# Patient Record
Sex: FEMALE | Race: BLACK OR AFRICAN AMERICAN | NOT HISPANIC OR LATINO | Employment: OTHER | URBAN - METROPOLITAN AREA
[De-identification: names, ages, dates, MRNs, and addresses within clinical notes are randomized per-mention and may not be internally consistent; named-entity substitution may affect disease eponyms.]

---

## 2021-01-20 ENCOUNTER — HISTORICAL (OUTPATIENT)
Dept: ADMINISTRATIVE | Facility: HOSPITAL | Age: 76
End: 2021-01-20

## 2021-01-20 LAB
ABS NEUT (OLG): 5.02 X10(3)/MCL (ref 2.1–9.2)
ALBUMIN SERPL-MCNC: 4.3 GM/DL (ref 3.4–4.8)
ALBUMIN/GLOB SERPL: 1.4 RATIO (ref 1.1–2)
ALP SERPL-CCNC: 67 UNIT/L (ref 40–150)
ALT SERPL-CCNC: 13 UNIT/L (ref 0–55)
APPEARANCE, UA: ABNORMAL
AST SERPL-CCNC: 20 UNIT/L (ref 5–34)
BACTERIA SPEC CULT: ABNORMAL /HPF
BASOPHILS # BLD AUTO: 0 X10(3)/MCL (ref 0–0.2)
BASOPHILS NFR BLD AUTO: 1 %
BILIRUB SERPL-MCNC: 0.6 MG/DL
BILIRUB UR QL STRIP: NEGATIVE
BILIRUBIN DIRECT+TOT PNL SERPL-MCNC: 0.3 MG/DL (ref 0–0.5)
BILIRUBIN DIRECT+TOT PNL SERPL-MCNC: 0.3 MG/DL (ref 0–0.8)
BUN SERPL-MCNC: 19.4 MG/DL (ref 9.8–20.1)
CALCIUM SERPL-MCNC: 10 MG/DL (ref 8.4–10.2)
CHLORIDE SERPL-SCNC: 108 MMOL/L (ref 98–107)
CHOLEST SERPL-MCNC: 170 MG/DL
CHOLEST/HDLC SERPL: 3 {RATIO} (ref 0–5)
CO2 SERPL-SCNC: 23 MMOL/L (ref 23–31)
COLOR UR: ABNORMAL
CREAT SERPL-MCNC: 1.37 MG/DL (ref 0.55–1.02)
DEPRECATED CALCIDIOL+CALCIFEROL SERPL-MC: 51.2 NG/ML (ref 30–80)
EOSINOPHIL # BLD AUTO: 0.2 X10(3)/MCL (ref 0–0.9)
EOSINOPHIL NFR BLD AUTO: 3 %
ERYTHROCYTE [DISTWIDTH] IN BLOOD BY AUTOMATED COUNT: 15.9 % (ref 11.5–17)
EST. AVERAGE GLUCOSE BLD GHB EST-MCNC: 114 MG/DL
GLOBULIN SER-MCNC: 3.1 GM/DL (ref 2.4–3.5)
GLUCOSE (UA): NEGATIVE
GLUCOSE SERPL-MCNC: 98 MG/DL (ref 82–115)
HBA1C MFR BLD: 5.6 %
HCT VFR BLD AUTO: 41.2 % (ref 37–47)
HDLC SERPL-MCNC: 53 MG/DL (ref 35–60)
HGB BLD-MCNC: 12.4 GM/DL (ref 12–16)
HGB UR QL STRIP: NEGATIVE
HYALINE CASTS #/AREA URNS LPF: ABNORMAL /[LPF]
KETONES UR QL STRIP: ABNORMAL
LDLC SERPL CALC-MCNC: 95 MG/DL (ref 50–140)
LEUKOCYTE ESTERASE UR QL STRIP: ABNORMAL
LYMPHOCYTES # BLD AUTO: 1.3 X10(3)/MCL (ref 0.6–4.6)
LYMPHOCYTES NFR BLD AUTO: 18 %
MCH RBC QN AUTO: 26.2 PG (ref 27–31)
MCHC RBC AUTO-ENTMCNC: 30.1 GM/DL (ref 33–36)
MCV RBC AUTO: 86.9 FL (ref 80–94)
MONOCYTES # BLD AUTO: 0.6 X10(3)/MCL (ref 0.1–1.3)
MONOCYTES NFR BLD AUTO: 8 %
NEUTROPHILS # BLD AUTO: 5.02 X10(3)/MCL (ref 2.1–9.2)
NEUTROPHILS NFR BLD AUTO: 70 %
NITRITE UR QL STRIP: NEGATIVE
PH UR STRIP: 5 [PH] (ref 5–9)
PLATELET # BLD AUTO: 188 X10(3)/MCL (ref 130–400)
PMV BLD AUTO: 11.4 FL (ref 9.4–12.4)
POTASSIUM SERPL-SCNC: 4.2 MMOL/L (ref 3.5–5.1)
PROT SERPL-MCNC: 7.4 GM/DL (ref 5.8–7.6)
PROT UR QL STRIP: ABNORMAL
RBC # BLD AUTO: 4.74 X10(6)/MCL (ref 4.2–5.4)
RBC #/AREA URNS HPF: ABNORMAL /HPF
SODIUM SERPL-SCNC: 143 MMOL/L (ref 136–145)
SP GR UR STRIP: 1.02 (ref 1–1.03)
SQUAMOUS EPITHELIAL, UA: ABNORMAL
TRIGL SERPL-MCNC: 109 MG/DL (ref 37–140)
TSH SERPL-ACNC: 2.35 UIU/ML (ref 0.35–4.94)
UROBILINOGEN UR STRIP-ACNC: 0.2
VIT B12 SERPL-MCNC: 462 PG/ML (ref 213–816)
VLDLC SERPL CALC-MCNC: 22 MG/DL
WBC # SPEC AUTO: 7.2 X10(3)/MCL (ref 4.5–11.5)
WBC #/AREA URNS HPF: ABNORMAL /HPF

## 2021-01-22 LAB — FINAL CULTURE: NORMAL

## 2021-02-03 ENCOUNTER — HISTORICAL (OUTPATIENT)
Dept: RADIOLOGY | Facility: HOSPITAL | Age: 76
End: 2021-02-03

## 2022-04-11 ENCOUNTER — HISTORICAL (OUTPATIENT)
Dept: ADMINISTRATIVE | Facility: HOSPITAL | Age: 77
End: 2022-04-11

## 2022-04-24 VITALS
OXYGEN SATURATION: 94 % | BODY MASS INDEX: 25.97 KG/M2 | WEIGHT: 132.25 LBS | SYSTOLIC BLOOD PRESSURE: 124 MMHG | DIASTOLIC BLOOD PRESSURE: 81 MMHG | HEIGHT: 60 IN

## 2022-05-26 DIAGNOSIS — F03.90 DEMENTIA WITHOUT BEHAVIORAL DISTURBANCE, UNSPECIFIED DEMENTIA TYPE: Primary | ICD-10-CM

## 2022-06-20 ENCOUNTER — TELEPHONE (OUTPATIENT)
Dept: INTERNAL MEDICINE | Facility: CLINIC | Age: 77
End: 2022-06-20
Payer: MEDICARE

## 2022-06-20 DIAGNOSIS — E78.5 DYSLIPIDEMIA (HIGH LDL; LOW HDL): ICD-10-CM

## 2022-06-20 DIAGNOSIS — I10 HYPERTENSION, UNSPECIFIED TYPE: Primary | ICD-10-CM

## 2022-06-20 NOTE — TELEPHONE ENCOUNTER
----- Message from Francis Malhotra sent at 6/20/2022 11:39 AM CDT -----  Please add pt  lab orders

## 2022-06-28 ENCOUNTER — OFFICE VISIT (OUTPATIENT)
Dept: INTERNAL MEDICINE | Facility: CLINIC | Age: 77
End: 2022-06-28
Payer: MEDICARE

## 2022-06-28 VITALS
RESPIRATION RATE: 16 BRPM | BODY MASS INDEX: 25.58 KG/M2 | OXYGEN SATURATION: 98 % | DIASTOLIC BLOOD PRESSURE: 82 MMHG | HEIGHT: 62 IN | SYSTOLIC BLOOD PRESSURE: 126 MMHG | HEART RATE: 70 BPM | TEMPERATURE: 99 F | WEIGHT: 139 LBS

## 2022-06-28 DIAGNOSIS — Z78.0 POSTMENOPAUSAL STATE: ICD-10-CM

## 2022-06-28 DIAGNOSIS — H61.20 IMPACTED CERUMEN, UNSPECIFIED LATERALITY: ICD-10-CM

## 2022-06-28 DIAGNOSIS — E78.5 DYSLIPIDEMIA: ICD-10-CM

## 2022-06-28 DIAGNOSIS — Z00.00 MEDICARE ANNUAL WELLNESS VISIT, SUBSEQUENT: Primary | ICD-10-CM

## 2022-06-28 DIAGNOSIS — I10 HYPERTENSION, UNSPECIFIED TYPE: ICD-10-CM

## 2022-06-28 DIAGNOSIS — I25.10 ARTERIOSCLEROSIS OF CORONARY ARTERY: ICD-10-CM

## 2022-06-28 DIAGNOSIS — Z12.31 SCREENING MAMMOGRAM FOR BREAST CANCER: ICD-10-CM

## 2022-06-28 PROCEDURE — 99214 OFFICE O/P EST MOD 30 MIN: CPT | Mod: ,,, | Performed by: INTERNAL MEDICINE

## 2022-06-28 PROCEDURE — 99214 PR OFFICE/OUTPT VISIT, EST, LEVL IV, 30-39 MIN: ICD-10-PCS | Mod: ,,, | Performed by: INTERNAL MEDICINE

## 2022-06-28 RX ORDER — NAPROXEN SODIUM 220 MG/1
81 TABLET, FILM COATED ORAL DAILY
COMMUNITY

## 2022-06-28 RX ORDER — PANTOPRAZOLE SODIUM 40 MG/1
40 TABLET, DELAYED RELEASE ORAL DAILY
COMMUNITY

## 2022-06-28 RX ORDER — OMEPRAZOLE 10 MG/1
10 CAPSULE, DELAYED RELEASE ORAL DAILY
COMMUNITY
End: 2022-06-28

## 2022-06-28 RX ORDER — QUETIAPINE FUMARATE 25 MG/1
25 TABLET, FILM COATED ORAL NIGHTLY
COMMUNITY
Start: 2022-06-27 | End: 2022-09-19 | Stop reason: SDUPTHER

## 2022-06-28 RX ORDER — SERTRALINE HYDROCHLORIDE 100 MG/1
150 TABLET, FILM COATED ORAL DAILY
COMMUNITY
Start: 2022-01-06 | End: 2023-04-12 | Stop reason: SDUPTHER

## 2022-06-28 RX ORDER — ATORVASTATIN CALCIUM 20 MG/1
20 TABLET, FILM COATED ORAL DAILY
COMMUNITY
Start: 2022-04-08 | End: 2023-03-20 | Stop reason: SDUPTHER

## 2022-06-28 RX ORDER — METOPROLOL SUCCINATE 25 MG/1
12.5 TABLET, EXTENDED RELEASE ORAL DAILY
COMMUNITY
Start: 2022-04-11

## 2022-06-28 RX ORDER — DONEPEZIL HYDROCHLORIDE 5 MG/1
5 TABLET, FILM COATED ORAL DAILY
Qty: 90 TABLET | Refills: 3 | Status: SHIPPED | OUTPATIENT
Start: 2022-06-28 | End: 2022-08-19

## 2022-06-28 RX ORDER — CHOLECALCIFEROL (VITAMIN D3) 25 MCG
5000 TABLET ORAL
COMMUNITY

## 2022-06-28 NOTE — ASSESSMENT & PLAN NOTE
General health maint education given  Labs ordered  Needs mammo and DEXA  PCV 20-- we are out today  Needs COVID 4th dose

## 2022-06-28 NOTE — PROGRESS NOTES
Subjective:      Patient ID: Grace Allen is a 77 y.o. female.    Chief Complaint: Medicare AWV      HPI:    75-year-old -American female here for wellness  [facility] for Cards  Patient with cognitive decline, new patient to us in January 2021; and we have not seen her since.   Seeing neurologist in [place] for cognitive decline; going see Issa in December  Needs ears cleaned   Patient having medication compliance issues  Was then reporting some nausea and dry heaving.  Has been   Trazodone caused nightmares      Problem List Items Addressed This Visit        ENT    Impacted cerumen    Relevant Orders    Ambulatory referral/consult to ENT       Cardiac/Vascular    Arteriosclerosis of coronary artery    Dyslipidemia    Relevant Orders    CBC Auto Differential    Comprehensive Metabolic Panel    Lipid Panel    Urinalysis, Reflex to Urine Culture Urine, Clean Catch    Hypertension    Relevant Orders    CBC Auto Differential    Comprehensive Metabolic Panel    Lipid Panel    Urinalysis, Reflex to Urine Culture Urine, Clean Catch       Renal/    Screening mammogram for breast cancer    Relevant Orders    Mammo Digital Screening Bilat    Postmenopausal state    Relevant Orders    DXA Bone Density Spine And Hip       Other    Medicare annual wellness visit, subsequent - Primary    Current Assessment & Plan     General health maint education given  Labs ordered  Needs mammo and DEXA  PCV 20-- we are out today  Needs COVID 4th dose                     Past Medical History:  Past Medical History:   Diagnosis Date    CAD (coronary artery disease)     Dyslipidemia     Hypertension      Past Surgical History:   Procedure Laterality Date    CHOLECYSTECTOMY       Review of patient's allergies indicates:   Allergen Reactions    Adhesive tape-silicones      Current Outpatient Medications on File Prior to Visit   Medication Sig Dispense Refill    aspirin 81 MG Chew Take 81 mg by mouth once daily at 6am.       atorvastatin (LIPITOR) 20 MG tablet Take 20 mg by mouth once daily at 6am.      metoprolol succinate (TOPROL-XL) 25 MG 24 hr tablet Take 12.5 mg by mouth once daily.      pantoprazole (PROTONIX) 40 MG tablet Take 40 mg by mouth once daily at 6am.      QUEtiapine (SEROQUEL) 25 MG Tab Take 25 mg by mouth nightly.      sertraline (ZOLOFT) 100 MG tablet Take 150 mg by mouth once daily.      vitamin D (VITAMIN D3) 1000 units Tab Take 5,000 Units by mouth.      [DISCONTINUED] omeprazole (PRILOSEC) 10 MG capsule Take 10 mg by mouth once daily at 6am.       No current facility-administered medications on file prior to visit.     Social History     Socioeconomic History    Marital status:    Tobacco Use    Smoking status: Never Smoker    Smokeless tobacco: Never Used   Substance and Sexual Activity    Alcohol use: Not Currently    Drug use: Never    Sexual activity: Not Currently     Family History   Problem Relation Age of Onset    Diabetes Mother     Lung cancer Father            Review of Systems  Constitutional: No fever,  no fatigue, no chills, no night sweats, no weight gain, no weight loss, no changes in appetite.   Eye: No redness, no acute vision loss, no blurred vision, no double vision, no eye pain  ENMT: No sore throat, no nasal drainage, no nose bleeds,  no headache, no ear pain, no ear drainage, no acute hearing loss  Respiratory: No cough, no sputum production, no shortness of breath, no hemoptysis, no wheezing.  Cardiovascular: No chest pain, no chest tightness, no VENCES, no PND, no orthopnea, no swelling, no palpitations.  Gastrointestinal: No abdominal pain, no nausea, no vomiting, no diarrhea, no constipation, no difficulty swallowing, no change in bowel habits, no rectal bleeding  Genitourinary: no urgency, no frequency, no burning or pain when urinating, no blood in urine, no incontinence  Heme/Lymph: No easy bruising and/or bleeding, no swollen or painful glands.  Endocrine: No  "polyuria, no polydipsia, no polyphagia, no heat or cold intolerance.  Musculoskeletal: No muscle pain, no muscle weakness, no joint pain, no red or swollen joints.  Integumentary: No rash, no pruritis, no hair or nail changes.  Neurologic: No dizziness, no fainting, no tremors, no tingling and/ or numbness.  Psychiatric: + anxiety, + depression, + memory loss  All Other ROS: Negative with exception of what is documented in the history of present illness     Objective:   /82 (BP Location: Left arm, Patient Position: Sitting, BP Method: Medium (Manual))   Pulse 70   Temp 98.6 °F (37 °C) (Temporal)   Resp 16   Ht 5' 2" (1.575 m)   Wt 63 kg (139 lb)   SpO2 98%   BMI 25.42 kg/m²     Physical Exam  General : Alert and oriented, No acute distress, well, developed, well nourished, afebrile   Eye : PERRLA. EOMI. Normal conjunctiva without injection. Sclerae are nonicteric. No pallor.  HEENT : Normocephalic. Neck supple. Normal hearing. Oral mucosa is moist.  Respiratory : Lungs are clear to auscultation bilaterally, non-labored. Symmetrical chest wall expansion. No crackles, wheeze, or rhonci.  Cardiovascular : Normal rate, Regular rhythm. No murmurs, rubs, or gallops. Pulses 2+ in all extremities. No Edema.  Gastrointestinal : Soft, nontender, non-distended, bowel sounds normal, no organomegaly, no guarding, no rebound.  Musculoskeletal : Normal ROM.  No muscle tenderness.  Integumentary : Warm to touch. Intact. No rash.    Neurologic : Alert and oriented. No focal deficits. Cranial Nerves II-XII are grossly intact.  Lymph: No palpable lymphadenopathy appreciated.  Psychiatric : Cooperative, Appropriate mood & affect. Normal judgment.          Assessment:     1. Medicare annual wellness visit, subsequent    2. Screening mammogram for breast cancer    3. Impacted cerumen, unspecified laterality    4. Postmenopausal state    5. Hypertension, unspecified type    6. Dyslipidemia    7. Arteriosclerosis of coronary " artery            Plan:       I have discontinued Grace Allen's omeprazole. I am also having her start on donepeziL. Additionally, I am having her maintain her sertraline, QUEtiapine, pantoprazole, metoprolol succinate, vitamin D, atorvastatin, and aspirin.      Problem List Items Addressed This Visit        ENT    Impacted cerumen    Relevant Orders    Ambulatory referral/consult to ENT       Cardiac/Vascular    Arteriosclerosis of coronary artery    Dyslipidemia    Relevant Orders    CBC Auto Differential    Comprehensive Metabolic Panel    Lipid Panel    Urinalysis, Reflex to Urine Culture Urine, Clean Catch    Hypertension    Relevant Orders    CBC Auto Differential    Comprehensive Metabolic Panel    Lipid Panel    Urinalysis, Reflex to Urine Culture Urine, Clean Catch       Renal/    Screening mammogram for breast cancer    Relevant Orders    Mammo Digital Screening Bilat    Postmenopausal state    Relevant Orders    DXA Bone Density Spine And Hip       Other    Medicare annual wellness visit, subsequent - Primary     General health maint education given  Labs ordered  Needs mammo and DEXA  PCV 20-- we are out today  Needs COVID 4th dose                   Grace was seen today for medicare awv.    Diagnoses and all orders for this visit:    Medicare annual wellness visit, subsequent    Screening mammogram for breast cancer  -     Mammo Digital Screening Bilat; Future    Impacted cerumen, unspecified laterality  -     Ambulatory referral/consult to ENT; Future    Postmenopausal state  -     DXA Bone Density Spine And Hip; Future    Hypertension, unspecified type  -     CBC Auto Differential; Future  -     Comprehensive Metabolic Panel; Future  -     Lipid Panel; Future  -     Urinalysis, Reflex to Urine Culture Urine, Clean Catch; Future    Dyslipidemia  -     CBC Auto Differential; Future  -     Comprehensive Metabolic Panel; Future  -     Lipid Panel; Future  -     Urinalysis, Reflex to Urine Culture  Urine, Clean Catch; Future    Arteriosclerosis of coronary artery    Other orders  -     donepeziL (ARICEPT) 5 MG tablet; Take 1 tablet (5 mg total) by mouth once daily.  -     Cancel: (In Office Administered) Pneumococcal Conjugate Vaccine (20 Valent) (IM)            Medications Ordered This Encounter   Medications    donepeziL (ARICEPT) 5 MG tablet     Sig: Take 1 tablet (5 mg total) by mouth once daily.     Dispense:  90 tablet     Refill:  3     [unfilled]  Orders Placed This Encounter   Procedures    Mammo Digital Screening Bilat     Standing Status:   Future     Standing Expiration Date:   6/28/2024     Order Specific Question:   May the Radiologist modify the order per protocol to meet the clinical needs of the patient?     Answer:   Yes     Order Specific Question:   Release to patient     Answer:   Immediate    DXA Bone Density Spine And Hip     Standing Status:   Future     Standing Expiration Date:   6/28/2025     Order Specific Question:   May the Radiologist modify the order per protocol to meet the clinical needs of the patient?     Answer:   Yes     Order Specific Question:   Release to patient     Answer:   Immediate    CBC Auto Differential     Standing Status:   Future     Standing Expiration Date:   8/27/2023    Comprehensive Metabolic Panel     Standing Status:   Future     Standing Expiration Date:   8/27/2023    Lipid Panel     Standing Status:   Future     Standing Expiration Date:   8/27/2023    Urinalysis, Reflex to Urine Culture Urine, Clean Catch     Standing Status:   Future     Standing Expiration Date:   8/27/2023     Order Specific Question:   Preferred Collection Type     Answer:   Urine, Clean Catch     Order Specific Question:   Specimen Source     Answer:   Urine    Ambulatory referral/consult to ENT     Standing Status:   Future     Standing Expiration Date:   7/28/2023     Referral Priority:   Routine     Referral Type:   Consultation     Referral Reason:   Specialty  Services Required     Referred to Provider:   Karthikeyan Doan MD     Requested Specialty:   Otolaryngology     Number of Visits Requested:   1                   Follow up in about 6 months (around 12/28/2022) for with labs prior to visit.

## 2022-07-01 ENCOUNTER — LAB VISIT (OUTPATIENT)
Dept: LAB | Facility: HOSPITAL | Age: 77
End: 2022-07-01
Attending: INTERNAL MEDICINE
Payer: MEDICARE

## 2022-07-01 DIAGNOSIS — I10 HYPERTENSION, UNSPECIFIED TYPE: ICD-10-CM

## 2022-07-01 DIAGNOSIS — E78.5 DYSLIPIDEMIA (HIGH LDL; LOW HDL): ICD-10-CM

## 2022-07-01 LAB
ALBUMIN SERPL-MCNC: 4 GM/DL (ref 3.4–4.8)
ALBUMIN/GLOB SERPL: 1.5 RATIO (ref 1.1–2)
ALP SERPL-CCNC: 55 UNIT/L (ref 40–150)
ALT SERPL-CCNC: 28 UNIT/L (ref 0–55)
APPEARANCE UR: CLEAR
AST SERPL-CCNC: 27 UNIT/L (ref 5–34)
BACTERIA #/AREA URNS AUTO: ABNORMAL /HPF
BASOPHILS # BLD AUTO: 0.04 X10(3)/MCL (ref 0–0.2)
BASOPHILS NFR BLD AUTO: 0.8 %
BILIRUB UR QL STRIP.AUTO: NEGATIVE MG/DL
BILIRUBIN DIRECT+TOT PNL SERPL-MCNC: 0.6 MG/DL
BUN SERPL-MCNC: 26.1 MG/DL (ref 9.8–20.1)
CALCIUM SERPL-MCNC: 9.8 MG/DL (ref 8.4–10.2)
CHLORIDE SERPL-SCNC: 110 MMOL/L (ref 98–107)
CHOLEST SERPL-MCNC: 147 MG/DL
CHOLEST/HDLC SERPL: 2 {RATIO} (ref 0–5)
CO2 SERPL-SCNC: 27 MMOL/L (ref 23–31)
COLOR UR AUTO: YELLOW
CREAT SERPL-MCNC: 1.41 MG/DL (ref 0.55–1.02)
EOSINOPHIL # BLD AUTO: 0.28 X10(3)/MCL (ref 0–0.9)
EOSINOPHIL NFR BLD AUTO: 5.6 %
ERYTHROCYTE [DISTWIDTH] IN BLOOD BY AUTOMATED COUNT: 15.4 % (ref 11.5–17)
GLOBULIN SER-MCNC: 2.6 GM/DL (ref 2.4–3.5)
GLUCOSE SERPL-MCNC: 89 MG/DL (ref 82–115)
GLUCOSE UR QL STRIP.AUTO: NEGATIVE MG/DL
HCT VFR BLD AUTO: 33.7 % (ref 37–47)
HDLC SERPL-MCNC: 60 MG/DL (ref 35–60)
HGB BLD-MCNC: 10.7 GM/DL (ref 12–16)
IMM GRANULOCYTES # BLD AUTO: 0.01 X10(3)/MCL (ref 0–0.04)
IMM GRANULOCYTES NFR BLD AUTO: 0.2 %
KETONES UR QL STRIP.AUTO: NEGATIVE MG/DL
LDLC SERPL CALC-MCNC: 79 MG/DL (ref 50–140)
LEUKOCYTE ESTERASE UR QL STRIP.AUTO: ABNORMAL UNIT/L
LYMPHOCYTES # BLD AUTO: 1.78 X10(3)/MCL (ref 0.6–4.6)
LYMPHOCYTES NFR BLD AUTO: 35.5 %
MCH RBC QN AUTO: 27 PG (ref 27–31)
MCHC RBC AUTO-ENTMCNC: 31.8 MG/DL (ref 33–36)
MCV RBC AUTO: 84.9 FL (ref 80–94)
MONOCYTES # BLD AUTO: 0.56 X10(3)/MCL (ref 0.1–1.3)
MONOCYTES NFR BLD AUTO: 11.2 %
NEUTROPHILS # BLD AUTO: 2.3 X10(3)/MCL (ref 2.1–9.2)
NEUTROPHILS NFR BLD AUTO: 46.7 %
NITRITE UR QL STRIP.AUTO: NEGATIVE
NRBC BLD AUTO-RTO: 0 %
PH UR STRIP.AUTO: 5.5 [PH]
PLATELET # BLD AUTO: 137 X10(3)/MCL (ref 130–400)
PMV BLD AUTO: 11 FL (ref 7.4–10.4)
POTASSIUM SERPL-SCNC: 4.4 MMOL/L (ref 3.5–5.1)
PROT SERPL-MCNC: 6.6 GM/DL (ref 5.8–7.6)
PROT UR QL STRIP.AUTO: NEGATIVE MG/DL
RBC # BLD AUTO: 3.97 X10(6)/MCL (ref 4.2–5.4)
RBC #/AREA URNS AUTO: <5 /HPF
RBC UR QL AUTO: NEGATIVE UNIT/L
SODIUM SERPL-SCNC: 143 MMOL/L (ref 136–145)
SP GR UR STRIP.AUTO: 1.02 (ref 1–1.03)
SQUAMOUS #/AREA URNS AUTO: <5 /HPF
TRIGL SERPL-MCNC: 42 MG/DL (ref 37–140)
TSH SERPL-ACNC: 4.24 UIU/ML (ref 0.35–4.94)
UROBILINOGEN UR STRIP-ACNC: 0.2 MG/DL
VLDLC SERPL CALC-MCNC: 8 MG/DL
WBC # SPEC AUTO: 5 X10(3)/MCL (ref 4.5–11.5)
WBC #/AREA URNS AUTO: 36 /HPF

## 2022-07-01 PROCEDURE — 36415 COLL VENOUS BLD VENIPUNCTURE: CPT

## 2022-07-01 PROCEDURE — 87077 CULTURE AEROBIC IDENTIFY: CPT

## 2022-07-01 PROCEDURE — 85025 COMPLETE CBC W/AUTO DIFF WBC: CPT

## 2022-07-01 PROCEDURE — 80053 COMPREHEN METABOLIC PANEL: CPT

## 2022-07-01 PROCEDURE — 84443 ASSAY THYROID STIM HORMONE: CPT

## 2022-07-01 PROCEDURE — 81001 URINALYSIS AUTO W/SCOPE: CPT

## 2022-07-01 PROCEDURE — 80061 LIPID PANEL: CPT

## 2022-07-03 LAB — BACTERIA UR CULT: ABNORMAL

## 2022-07-06 ENCOUNTER — TELEPHONE (OUTPATIENT)
Dept: INTERNAL MEDICINE | Facility: CLINIC | Age: 77
End: 2022-07-06
Payer: MEDICARE

## 2022-07-06 DIAGNOSIS — D64.9 ANEMIA, UNSPECIFIED TYPE: Primary | ICD-10-CM

## 2022-07-06 NOTE — TELEPHONE ENCOUNTER
----- Message from Dewey Cazares MD sent at 7/6/2022 12:50 PM CDT -----  Laboratory studies reviewed patient with some mild anemia  Patient would benefit from over-the-counter iron supplement or prenatal multivitamin with iron  Will plan for recheck of iron studies in 6 weeks, she can come by and  some stool cards as well to test for blood in the stool.  Lab orders have been placed

## 2022-07-06 NOTE — PROGRESS NOTES
Laboratory studies reviewed patient with some mild anemia  Patient would benefit from over-the-counter iron supplement or prenatal multivitamin with iron  Will plan for recheck of iron studies in 6 weeks, she can come by and  some stool cards as well to test for blood in the stool.  Lab orders have been placed

## 2022-07-11 ENCOUNTER — CLINICAL SUPPORT (OUTPATIENT)
Dept: INTERNAL MEDICINE | Facility: CLINIC | Age: 77
End: 2022-07-11
Payer: MEDICARE

## 2022-07-11 DIAGNOSIS — Z23 NEED FOR PNEUMOCOCCAL VACCINATION: Primary | ICD-10-CM

## 2022-07-11 PROCEDURE — 90677 PR PNEUMOCOCCAL CONJ VACCINE, 20 VALENT, IM: ICD-10-PCS | Mod: ,,, | Performed by: INTERNAL MEDICINE

## 2022-07-11 PROCEDURE — G0009 PR ADMIN PNEUMOCOCCAL VACCINE: ICD-10-PCS | Mod: ,,, | Performed by: INTERNAL MEDICINE

## 2022-07-11 PROCEDURE — G0009 ADMIN PNEUMOCOCCAL VACCINE: HCPCS | Mod: ,,, | Performed by: INTERNAL MEDICINE

## 2022-07-11 PROCEDURE — 90677 PCV20 VACCINE IM: CPT | Mod: ,,, | Performed by: INTERNAL MEDICINE

## 2022-07-11 NOTE — PROGRESS NOTES
Pt came into office today for Pneumo 20 vaccine.   Pt tolerated immunization well.   Pneumo 20 was given in left Deltoid.

## 2022-07-25 ENCOUNTER — TELEPHONE (OUTPATIENT)
Dept: INTERNAL MEDICINE | Facility: CLINIC | Age: 77
End: 2022-07-25
Payer: MEDICARE

## 2022-07-25 NOTE — TELEPHONE ENCOUNTER
----- Message from Crys Shaknar sent at 7/25/2022  1:35 PM CDT -----  Regarding: med advice  .Type:  Needs Medical Advice    Who Called: Pt's daughter, Barbie  Symptoms (please be specific):    How long has patient had these symptoms:    Pharmacy name and phone #:  [id number] - [place] - [address] AT [facility] AT [place]  Would the patient rather a call back or a response via [facility]? Call back  Best Call Back Number: [phone]  Additional Information: Pt has been nightmares while on donepeziL (ARICEPT) 5 MG tablet, pt would like med advise, pt has been off meds for 2 nights now

## 2022-07-25 NOTE — TELEPHONE ENCOUNTER
Did symptoms resolve since stopping the medication? If so then we won't resume  If she is still having symptoms then she will need to come in for a work up

## 2022-07-25 NOTE — TELEPHONE ENCOUNTER
Daughter Barbie stated patient started with nightmares and headaches on the 2nd week after starting Aricept 5 mg. Daughters been holding medication, please advise!

## 2022-08-19 ENCOUNTER — HOSPITAL ENCOUNTER (OUTPATIENT)
Dept: RADIOLOGY | Facility: HOSPITAL | Age: 77
Discharge: HOME | End: 2022-08-19
Attending: INTERNAL MEDICINE
Payer: MEDICARE

## 2022-08-19 ENCOUNTER — OFFICE VISIT (OUTPATIENT)
Dept: INTERNAL MEDICINE | Facility: CLINIC | Age: 77
End: 2022-08-19
Payer: MEDICARE

## 2022-08-19 VITALS
HEART RATE: 65 BPM | RESPIRATION RATE: 16 BRPM | HEIGHT: 60 IN | BODY MASS INDEX: 27.48 KG/M2 | TEMPERATURE: 98 F | OXYGEN SATURATION: 98 % | SYSTOLIC BLOOD PRESSURE: 136 MMHG | DIASTOLIC BLOOD PRESSURE: 84 MMHG | WEIGHT: 140 LBS

## 2022-08-19 DIAGNOSIS — T14.8XXA BRUISING: ICD-10-CM
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
## 2022-08-19 DIAGNOSIS — M79.601 RIGHT ARM PAIN: ICD-10-CM

## 2022-08-19 DIAGNOSIS — F02.80 EARLY ONSET ALZHEIMER'S DEMENTIA WITHOUT BEHAVIORAL DISTURBANCE: Primary | ICD-10-CM

## 2022-08-19 DIAGNOSIS — R07.81 RIB PAIN: ICD-10-CM

## 2022-08-19 DIAGNOSIS — G30.0 EARLY ONSET ALZHEIMER'S DEMENTIA WITHOUT BEHAVIORAL DISTURBANCE: Primary | ICD-10-CM

## 2022-08-19 DIAGNOSIS — R41.0 CONFUSION: ICD-10-CM

## 2022-08-19 PROBLEM — G30.9 ALZHEIMER'S DEMENTIA: Status: ACTIVE | Noted: 2022-08-19

## 2022-08-19 PROCEDURE — 99214 PR OFFICE/OUTPT VISIT, EST, LEVL IV, 30-39 MIN: ICD-10-PCS | Mod: ,,, | Performed by: INTERNAL MEDICINE

## 2022-08-19 PROCEDURE — 71110 X-RAY EXAM RIBS BIL 3 VIEWS: CPT | Mod: TC

## 2022-08-19 PROCEDURE — 99214 OFFICE O/P EST MOD 30 MIN: CPT | Mod: ,,, | Performed by: INTERNAL MEDICINE

## 2022-08-19 PROCEDURE — 73060 X-RAY EXAM OF HUMERUS: CPT | Mod: TC,RT

## 2022-08-19 RX ORDER — METHOCARBAMOL 500 MG/1
500 TABLET, FILM COATED ORAL 4 TIMES DAILY PRN
Qty: 40 TABLET | Refills: 0 | Status: SHIPPED | OUTPATIENT
Start: 2022-08-19 | End: 2022-08-29

## 2022-08-19 RX ORDER — MEMANTINE HYDROCHLORIDE 5 MG/1
5 TABLET ORAL 2 TIMES DAILY
Qty: 60 TABLET | Refills: 11 | Status: SHIPPED | OUTPATIENT
Start: 2022-08-19 | End: 2022-12-28 | Stop reason: SDUPTHER

## 2022-08-19 RX ORDER — CIPROFLOXACIN 250 MG/1
250 TABLET, FILM COATED ORAL 2 TIMES DAILY
Qty: 10 TABLET | Refills: 0 | Status: SHIPPED | OUTPATIENT
Start: 2022-08-19 | End: 2022-08-24

## 2022-08-19 NOTE — PROGRESS NOTES
Subjective:      Patient ID: Grace Allen is a 77 y.o. female.    Chief Complaint: Follow-up ([facility] ED F/U from Fall )      HPI:    77-year-old female had a fall at  [place] she slipped on some give cards and fell on her right side hitting her chest right arm and head.  She presented to a well-developed emergency room had a CT of the head that was unremarkable and was discharged home.  There was no rib x-ray or right upper extremity x-ray, no labs are urine testing.  She complains of pain to the right chest wall and right forearm.  She has baseline dementia and always is a little confused but seems a little bit more confused today.  Will plan on ordering some labs and a urine study as well as some rib films and high right humeral films.      Problem List Items Addressed This Visit        Neuro    Alzheimer's dementia - Primary    Current Assessment & Plan     Daughter reports side effects Aricept, we have discontinued will start her on some Namenda.           Confusion    Current Assessment & Plan     There was no rib x-ray or right upper extremity x-ray, no labs are urine testing.  She complains of pain to the right chest wall and right forearm.  She has baseline dementia and always is a little confused but seems a little bit more confused today.  Will plan on ordering some labs and a urine study as well as some rib films and high right humeral films.           Relevant Orders    Urinalysis, Reflex to Urine Culture Urine, Clean Catch    CBC Auto Differential    Comprehensive Metabolic Panel       Orthopedic    Rib pain    Right arm pain      Other Visit Diagnoses     Fall against object        Relevant Orders    X-Ray Ribs 3 Views Bilateral    X-Ray Humerus 2 View Right    Urinalysis, Reflex to Urine Culture Urine, Clean Catch    CBC Auto Differential    Comprehensive Metabolic Panel    Bruising        Relevant Orders    X-Ray Ribs 3 Views Bilateral    X-Ray Humerus 2 View Right              Past Medical  History:  Past Medical History:   Diagnosis Date    CAD (coronary artery disease)     Dyslipidemia     Hypertension      Past Surgical History:   Procedure Laterality Date    CHOLECYSTECTOMY       Review of patient's allergies indicates:   Allergen Reactions    Adhesive tape-silicones      Current Outpatient Medications on File Prior to Visit   Medication Sig Dispense Refill    aspirin 81 MG Chew Take 81 mg by mouth once daily at 6am.      atorvastatin (LIPITOR) 20 MG tablet Take 20 mg by mouth once daily at 6am.      metoprolol succinate (TOPROL-XL) 25 MG 24 hr tablet Take 12.5 mg by mouth once daily.      pantoprazole (PROTONIX) 40 MG tablet Take 40 mg by mouth once daily at 6am.      QUEtiapine (SEROQUEL) 25 MG Tab Take 25 mg by mouth nightly.      sertraline (ZOLOFT) 100 MG tablet Take 150 mg by mouth once daily.      vitamin D (VITAMIN D3) 1000 units Tab Take 5,000 Units by mouth.      [DISCONTINUED] donepeziL (ARICEPT) 5 MG tablet Take 1 tablet (5 mg total) by mouth once daily. 90 tablet 3     No current facility-administered medications on file prior to visit.     Social History     Socioeconomic History    Marital status:    Tobacco Use    Smoking status: Never Smoker    Smokeless tobacco: Never Used   Substance and Sexual Activity    Alcohol use: Not Currently    Drug use: Never    Sexual activity: Not Currently     Family History   Problem Relation Age of Onset    Diabetes Mother     Lung cancer Father            Review of Systems  Constitutional: No fever,  no fatigue, no chills, no night sweats, no weight gain, no weight loss, no changes in appetite.   Eye: No redness, no acute vision loss, no blurred vision, no double vision, no eye pain  ENMT: No sore throat, no nasal drainage, no nose bleeds,  no headache, no ear pain, no ear drainage, no acute hearing loss  Respiratory: No cough, no sputum production, no shortness of breath, no hemoptysis, no wheezing.  Cardiovascular: No  chest pain, no chest tightness, no VENCES, no PND, no orthopnea, no swelling, no palpitations.  Gastrointestinal: No abdominal pain, no nausea, no vomiting, no diarrhea, no constipation, no difficulty swallowing, no change in bowel habits, no rectal bleeding  Genitourinary: no urgency, no frequency, no burning or pain when urinating, no blood in urine, no incontinence  Heme/Lymph: No easy bruising and/or bleeding, no swollen or painful glands. +bruising  Endocrine: No polyuria, no polydipsia, no polyphagia, no heat or cold intolerance.  Musculoskeletal: + muscle pain, no muscle weakness, no joint pain, no red or swollen joints.  Integumentary: No rash, no pruritis, no hair or nail changes.  Neurologic: No dizziness, no fainting, no tremors, no tingling and/ or numbness.  Psychiatric: No anxiety, no depression, no memory loss  All Other ROS: Negative with exception of what is documented in the history of present illness     Objective:   /84 (BP Location: Left arm, Patient Position: Sitting, BP Method: Medium (Manual))   Pulse 65   Temp 97.5 °F (36.4 °C) (Temporal)   Resp 16   Ht 5' (1.524 m)   Wt 63.5 kg (140 lb)   SpO2 98%   BMI 27.34 kg/m²     Physical Exam  General : Alert and oriented, No acute distress, well, developed, well nourished, afebrile   Eye : PERRLA. EOMI. .  Musculoskeletal : Normal ROM.  Tenderness noted on the area of the 5th and 6th ribs on the right.  Integumentary : Warm to touch. Intact. No rash.   positive bruising noted to the right humerus, no appreciable swelling, warmth or otherwise discoloration.    Neurologic : Alert and oriented x2. No focal deficits. Cranial Nerves II-XII are grossly intact.  Lymph: No palpable lymphadenopathy appreciated.  Psychiatric : Cooperative, Appropriate mood & affect. Normal judgment.          Assessment:     1. Early onset Alzheimer's dementia without behavioral disturbance    2. Fall against object    3. Bruising    4. Confusion    5. Rib pain    6.  Right arm pain                  Plan:       I have discontinued Grace Allen's donepeziL. I am also having her start on methocarbamoL and memantine. Additionally, I am having her maintain her sertraline, QUEtiapine, pantoprazole, metoprolol succinate, vitamin D, atorvastatin, and aspirin.      Problem List Items Addressed This Visit        Neuro    Alzheimer's dementia - Primary     Daughter reports side effects Aricept, we have discontinued will start her on some Namenda.           Confusion     There was no rib x-ray or right upper extremity x-ray, no labs are urine testing.  She complains of pain to the right chest wall and right forearm.  She has baseline dementia and always is a little confused but seems a little bit more confused today.  Will plan on ordering some labs and a urine study as well as some rib films and high right humeral films.           Relevant Orders    Urinalysis, Reflex to Urine Culture Urine, Clean Catch    CBC Auto Differential    Comprehensive Metabolic Panel       Orthopedic    Rib pain    Right arm pain      Other Visit Diagnoses     Fall against object        Relevant Orders    X-Ray Ribs 3 Views Bilateral    X-Ray Humerus 2 View Right    Urinalysis, Reflex to Urine Culture Urine, Clean Catch    CBC Auto Differential    Comprehensive Metabolic Panel    Bruising        Relevant Orders    X-Ray Ribs 3 Views Bilateral    X-Ray Humerus 2 View Right            Grace was seen today for follow-up.    Diagnoses and all orders for this visit:    Early onset Alzheimer's dementia without behavioral disturbance    Fall against object  -     X-Ray Ribs 3 Views Bilateral; Future  -     X-Ray Humerus 2 View Right; Future  -     Urinalysis, Reflex to Urine Culture Urine, Clean Catch; Future  -     CBC Auto Differential; Future  -     Comprehensive Metabolic Panel; Future    Bruising  -     X-Ray Ribs 3 Views Bilateral; Future  -     X-Ray Humerus 2 View Right; Future    Confusion  -     Urinalysis,  Reflex to Urine Culture Urine, Clean Catch; Future  -     CBC Auto Differential; Future  -     Comprehensive Metabolic Panel; Future    Rib pain    Right arm pain    Other orders  -     methocarbamoL (ROBAXIN) 500 MG Tab; Take 1 tablet (500 mg total) by mouth 4 (four) times daily as needed (pain).  -     memantine (NAMENDA) 5 MG Tab; Take 1 tablet (5 mg total) by mouth 2 (two) times daily.            Medications Ordered This Encounter   Medications    memantine (NAMENDA) 5 MG Tab     Sig: Take 1 tablet (5 mg total) by mouth 2 (two) times daily.     Dispense:  60 tablet     Refill:  11    methocarbamoL (ROBAXIN) 500 MG Tab     Sig: Take 1 tablet (500 mg total) by mouth 4 (four) times daily as needed (pain).     Dispense:  40 tablet     Refill:  0     [unfilled]  Orders Placed This Encounter   Procedures    X-Ray Ribs 3 Views Bilateral     Standing Status:   Future     Standing Expiration Date:   9/19/2022     Order Specific Question:   May the Radiologist modify the order per protocol to meet the clinical needs of the patient?     Answer:   Yes     Order Specific Question:   Release to patient     Answer:   Immediate    X-Ray Humerus 2 View Right     Standing Status:   Future     Standing Expiration Date:   9/19/2022     Order Specific Question:   May the Radiologist modify the order per protocol to meet the clinical needs of the patient?     Answer:   Yes     Order Specific Question:   Release to patient     Answer:   Immediate    Urinalysis, Reflex to Urine Culture Urine, Clean Catch     Standing Status:   Future     Standing Expiration Date:   9/19/2022     Order Specific Question:   Preferred Collection Type     Answer:   Urine, Clean Catch     Order Specific Question:   Specimen Source     Answer:   Urine    CBC Auto Differential     Standing Status:   Future     Standing Expiration Date:   9/19/2022    Comprehensive Metabolic Panel     Standing Status:   Future     Standing Expiration Date:   9/19/2022        Medication List with Changes/Refills   New Medications    MEMANTINE (NAMENDA) 5 MG TAB    Take 1 tablet (5 mg total) by mouth 2 (two) times daily.    METHOCARBAMOL (ROBAXIN) 500 MG TAB    Take 1 tablet (500 mg total) by mouth 4 (four) times daily as needed (pain).   Current Medications    ASPIRIN 81 MG CHEW    Take 81 mg by mouth once daily at 6am.    ATORVASTATIN (LIPITOR) 20 MG TABLET    Take 20 mg by mouth once daily at 6am.    METOPROLOL SUCCINATE (TOPROL-XL) 25 MG 24 HR TABLET    Take 12.5 mg by mouth once daily.    PANTOPRAZOLE (PROTONIX) 40 MG TABLET    Take 40 mg by mouth once daily at 6am.    QUETIAPINE (SEROQUEL) 25 MG TAB    Take 25 mg by mouth nightly.    SERTRALINE (ZOLOFT) 100 MG TABLET    Take 150 mg by mouth once daily.    VITAMIN D (VITAMIN D3) 1000 UNITS TAB    Take 5,000 Units by mouth.   Discontinued Medications    DONEPEZIL (ARICEPT) 5 MG TABLET    Take 1 tablet (5 mg total) by mouth once daily.      Medication List with Changes/Refills   New Medications    MEMANTINE (NAMENDA) 5 MG TAB    Take 1 tablet (5 mg total) by mouth 2 (two) times daily.       Start Date: 8/19/2022 End Date: 8/19/2023    METHOCARBAMOL (ROBAXIN) 500 MG TAB    Take 1 tablet (500 mg total) by mouth 4 (four) times daily as needed (pain).       Start Date: 8/19/2022 End Date: 8/29/2022   Current Medications    ASPIRIN 81 MG CHEW    Take 81 mg by mouth once daily at 6am.       Start Date: --        End Date: --    ATORVASTATIN (LIPITOR) 20 MG TABLET    Take 20 mg by mouth once daily at 6am.       Start Date: 4/8/2022  End Date: --    METOPROLOL SUCCINATE (TOPROL-XL) 25 MG 24 HR TABLET    Take 12.5 mg by mouth once daily.       Start Date: 4/11/2022 End Date: --    PANTOPRAZOLE (PROTONIX) 40 MG TABLET    Take 40 mg by mouth once daily at 6am.       Start Date: --        End Date: --    QUETIAPINE (SEROQUEL) 25 MG TAB    Take 25 mg by mouth nightly.       Start Date: 6/27/2022 End Date: --    SERTRALINE (ZOLOFT) 100 MG  TABLET    Take 150 mg by mouth once daily.       Start Date: 1/6/2022  End Date: --    VITAMIN D (VITAMIN D3) 1000 UNITS TAB    Take 5,000 Units by mouth.       Start Date: --        End Date: --   Discontinued Medications    DONEPEZIL (ARICEPT) 5 MG TABLET    Take 1 tablet (5 mg total) by mouth once daily.       Start Date: 6/28/2022 End Date: 8/19/2022        An office visit for an established patient was performed. 10 minutes was used for reviewing the patients chart prior to the [facility] visit done on that same day. 15 minutes was used during the visit in regards to taking the patient history and physical exam. There was also an additional 5 minutes spent on education and counseling regarding medical conditions, current medications including risk/benefit and side effects/adverse events, vaccine counseling. After leaving the exam room, the provider then spent an additional 5 minutes completing the electronic health record.    The patient is receptive, expresses understanding and is agreeable to plan. All questions answered; total time spent was 35 minutes.     Follow up if symptoms worsen or fail to improve.

## 2022-08-19 NOTE — ASSESSMENT & PLAN NOTE
There was no rib x-ray or right upper extremity x-ray, no labs are urine testing.  She complains of pain to the right chest wall and right forearm.  She has baseline dementia and always is a little confused but seems a little bit more confused today.  Will plan on ordering some labs and a urine study as well as some rib films and high right humeral films.

## 2022-08-22 NOTE — PROGRESS NOTES
X-ray reviewed patient with rib fracture over the 7th right rib it is nondisplaced.  No further treatment needed.  Arm x-ray is normal without any fracture.

## 2022-09-19 ENCOUNTER — OFFICE VISIT (OUTPATIENT)
Dept: INTERNAL MEDICINE | Facility: CLINIC | Age: 77
End: 2022-09-19
Payer: MEDICARE

## 2022-09-19 ENCOUNTER — HOSPITAL ENCOUNTER (OUTPATIENT)
Dept: RADIOLOGY | Facility: HOSPITAL | Age: 77
Discharge: HOME | End: 2022-09-19
Attending: INTERNAL MEDICINE
Payer: MEDICARE

## 2022-09-19 ENCOUNTER — TELEPHONE (OUTPATIENT)
Dept: INTERNAL MEDICINE | Facility: CLINIC | Age: 77
End: 2022-09-19

## 2022-09-19 VITALS
DIASTOLIC BLOOD PRESSURE: 90 MMHG | BODY MASS INDEX: 28.28 KG/M2 | TEMPERATURE: 97 F | OXYGEN SATURATION: 98 % | HEART RATE: 55 BPM | WEIGHT: 140.31 LBS | SYSTOLIC BLOOD PRESSURE: 142 MMHG | RESPIRATION RATE: 16 BRPM | HEIGHT: 59 IN

## 2022-09-19 DIAGNOSIS — M54.9 BACK PAIN, UNSPECIFIED BACK LOCATION, UNSPECIFIED BACK PAIN LATERALITY, UNSPECIFIED CHRONICITY: ICD-10-CM

## 2022-09-19 DIAGNOSIS — I10 HYPERTENSION, UNSPECIFIED TYPE: ICD-10-CM

## 2022-09-19 DIAGNOSIS — M54.9 BACK PAIN, UNSPECIFIED BACK LOCATION, UNSPECIFIED BACK PAIN LATERALITY, UNSPECIFIED CHRONICITY: Primary | ICD-10-CM

## 2022-09-19 DIAGNOSIS — M79.601 RIGHT ARM PAIN: ICD-10-CM

## 2022-09-19 DIAGNOSIS — G30.0 EARLY ONSET ALZHEIMER'S DEMENTIA WITHOUT BEHAVIORAL DISTURBANCE: ICD-10-CM

## 2022-09-19 DIAGNOSIS — M25.519 SHOULDER PAIN, UNSPECIFIED CHRONICITY, UNSPECIFIED LATERALITY: ICD-10-CM

## 2022-09-19 DIAGNOSIS — F02.80 EARLY ONSET ALZHEIMER'S DEMENTIA WITHOUT BEHAVIORAL DISTURBANCE: ICD-10-CM

## 2022-09-19 DIAGNOSIS — M54.50 LUMBAR BACK PAIN: ICD-10-CM

## 2022-09-19 PROCEDURE — 72081 X-RAY EXAM ENTIRE SPI 1 VW: CPT | Mod: TC

## 2022-09-19 PROCEDURE — 73030 X-RAY EXAM OF SHOULDER: CPT | Mod: TC,LT

## 2022-09-19 PROCEDURE — 99214 OFFICE O/P EST MOD 30 MIN: CPT | Mod: ,,, | Performed by: INTERNAL MEDICINE

## 2022-09-19 PROCEDURE — 99214 PR OFFICE/OUTPT VISIT, EST, LEVL IV, 30-39 MIN: ICD-10-PCS | Mod: ,,, | Performed by: INTERNAL MEDICINE

## 2022-09-19 PROCEDURE — 72100 X-RAY EXAM L-S SPINE 2/3 VWS: CPT | Mod: TC,59

## 2022-09-19 RX ORDER — METHOCARBAMOL 500 MG/1
500 TABLET, FILM COATED ORAL NIGHTLY
Qty: 30 TABLET | Refills: 0 | Status: SHIPPED | OUTPATIENT
Start: 2022-09-19 | End: 2022-12-02

## 2022-09-19 RX ORDER — QUETIAPINE FUMARATE 25 MG/1
TABLET, FILM COATED ORAL
Qty: 90 TABLET | Refills: 3
Start: 2022-09-19 | End: 2022-10-21 | Stop reason: SDUPTHER

## 2022-09-19 NOTE — ASSESSMENT & PLAN NOTE
Pain is located in the right shoulder however patient winced in pain before I even touch the shoulder and then if I distracted her in touch the shoulder it did not seem to bother her.  Will get an x-ray just to make sure but I do not think there is anything that is truly there her range of motion is normal

## 2022-09-19 NOTE — PROGRESS NOTES
Subjective:      Patient ID: Grace Allen is a 77 y.o. female.    Chief Complaint: Shoulder Pain and Back Pain      HPI:  Headaches ; occipital and radiates to the front of the head.   She is going to bed at 10 and not waking up till 10-11  She is supposed only be taking Seroquel 25 at night but she is taking it twice a day I discussed with her daughter and then ate about doing it just half tablet at bedtime and see if that does not help with her lethargy.  She did have a fall several weeks ago she has some known rib fractures we did CT her head at that time was negative.    She complains of pain in her right shoulder and pain in her lower back.  She did not have those complaints when she came last week.    Past Medical History:  Past Medical History:   Diagnosis Date    CAD (coronary artery disease)     Dyslipidemia     Hypertension      Past Surgical History:   Procedure Laterality Date    CHOLECYSTECTOMY       Review of patient's allergies indicates:   Allergen Reactions    Adhesive tape-silicones      Current Outpatient Medications on File Prior to Visit   Medication Sig Dispense Refill    aspirin 81 MG Chew Take 81 mg by mouth once daily at 6am.      atorvastatin (LIPITOR) 20 MG tablet Take 20 mg by mouth once daily at 6am.      memantine (NAMENDA) 5 MG Tab Take 1 tablet (5 mg total) by mouth 2 (two) times daily. 60 tablet 11    metoprolol succinate (TOPROL-XL) 25 MG 24 hr tablet Take 12.5 mg by mouth once daily.      pantoprazole (PROTONIX) 40 MG tablet Take 40 mg by mouth once daily at 6am.      sertraline (ZOLOFT) 100 MG tablet Take 150 mg by mouth once daily.      vitamin D (VITAMIN D3) 1000 units Tab Take 5,000 Units by mouth.      [DISCONTINUED] QUEtiapine (SEROQUEL) 25 MG Tab Take 25 mg by mouth nightly.       No current facility-administered medications on file prior to visit.     Social History     Socioeconomic History    Marital status:    Tobacco Use    Smoking status: Never    Smokeless  "tobacco: Never   Substance and Sexual Activity    Alcohol use: Not Currently    Drug use: Never    Sexual activity: Not Currently     Family History   Problem Relation Age of Onset    Diabetes Mother     Lung cancer Father        Review of Systems  Constitutional: No fever, no chills, no night sweats, no changes in weight, no changes in appetite.  Eye: No blurring of vision, no double vision, no conjunctival injection, no acute vision loss  ENMT: No trauma, No sore throat, no rhinorrhea, no tinnitus, + headache, no vertigo, no ear pain or discharge  Respiratory: No cough, no sputum production, no shortness of breath, no hemoptysis, no wheezing.  Cardiovascular: No chest pain, no VENCES, no PND, no orthopnea, no edema, no palpitations.  Gastrointestinal: No abdominal pain, nausea, no vomiting, no changes in frequency or consistency of stools, no diarrhea, no constipation, no BRBPR.  Genitourinary: No dysuria, no hematuria, no foul-smelling urine, no straining to urinate, no increase in frequency  Heme/Lymph: No easy bruising/ bleeding, no swollen or painful glands.  Endocrine: No polyuria, no polydipsia, no polyphagia, no heat or cold intolerance.  Musculoskeletal: No muscle pain, no muscle weakness, + joint pain, no difficulties on reference to range of motion.  Integumentary: No rash, no pruritis.  Neurologic: No sensory deficit, no motor deficit, no headache, no neck rigidity, no paresthesias, no syncope.  Psychiatric: no mood changes, no anxiety, no depression, no tension, no memory defecits  All Other ROS: Negative with exception of what is documented in the history of present illness   A comprehensive review of systems was performed and was negative with exception of what is documented above.     Objective:   BP (!) 142/90 (BP Location: Left arm, Patient Position: Sitting, BP Method: Medium (Manual))   Pulse (!) 55   Temp 96.9 °F (36.1 °C) (Temporal)   Resp 16   Ht 4' 11" (1.499 m)   Wt 63.6 kg (140 lb 4.8 " oz)   SpO2 98%   BMI 28.34 kg/m²   Physical Exam  General : Alert and oriented, No acute distress, afebrile.  Eye : PERRLA. EOMI. Normal conjunctiva, Sclerae are nonicteric. No conjunctival injection, no pallor.  HEENT : Normocephalic/ atraumatic, Normal hearing, Oral mucosa is moist.  Respiratory : Respirations are non-labored and clear to auscultation bilaterally. Symmetrical air entry bilaterally, no crackles, no wheezes, no rhonchi. No cyanosis, no clubbing.  Cardiovascular : Normal rate, Regular rhythm. No murmurs, rubs, or gallops. Pulses are 2+ throughout. No JVD. No Edema.  Gastrointestinal : Soft, nontender, non-distended, bowel sounds are present in all quadrants, no organomegaly, no guarding, no rebound.  Musculoskeletal : Normal range of motion throughout. No muscle tenderness.  No tenderness noted on any of the spinous processes, no paraspinal muscle tenderness.  Normal range of motion of that right shoulder  Integumentary : Warm, moist, intact.  Neurologic : Alert, Oriented  Psychiatric : Cooperative, Appropriate mood & affect.   Assessment/ Plan:   1. Back pain, unspecified back location, unspecified back pain laterality, unspecified chronicity  -     X-Ray Lumbar Spine 2 Or 3 Views    2. Shoulder pain, unspecified chronicity, unspecified laterality  -     X-Ray Shoulder 2 or More Views Left    3. Early onset Alzheimer's dementia without behavioral disturbance  Assessment & Plan:  Patient appears to be a little bit over-sedated so were going to cut her Seroquel in half to 12.5 at 3:00 p.m. we may need to add an additional half tablet in the morning but will see how she does.      4. Hypertension, unspecified type  Assessment & Plan:  Monitor blood pressure at home and call me with an update we may need to add an additional agent      5. Right arm pain  Assessment & Plan:  Pain is located in the right shoulder however patient winced in pain before I even touch the shoulder and then if I distracted  her in touch the shoulder it did not seem to bother her.  Will get an x-ray just to make sure but I do not think there is anything that is truly there her range of motion is normal      6. Lumbar back pain  Assessment & Plan:  Lumbar x-ray ordered, patient with no tenderness over any of the spinous processes.      Other orders  -     QUEtiapine (SEROQUEL) 25 MG Tab  -     methocarbamoL (ROBAXIN) 500 MG Tab           Follow up if symptoms worsen or fail to improve.    An office visit for an established patient was performed. 10 minutes was used for reviewing the patients chart prior to the inoice visit done on that same day. 15 minutes was used during the visit in regards to taking the patient history and physical exam. There was also an additional 5 minutes spent on education and counseling regarding medical conditions, current medications including risk/benefit and side effects/adverse events, vaccine counseling. After leaving the exam room, the provider then spent an additional 5 minutes completing the electronic health record.    The patient is receptive, expresses understanding and is agreeable to plan. All questions answered; total time spent was 35 minutes.

## 2022-09-19 NOTE — PROGRESS NOTES
Right shoulder x-ray with no acute evidence of fracture or dislocation.  Some mild arthritic changes but no other findings

## 2022-09-19 NOTE — ASSESSMENT & PLAN NOTE
Patient appears to be a little bit over-sedated so were going to cut her Seroquel in half to 12.5 at 3:00 p.m. we may need to add an additional half tablet in the morning but will see how she does.

## 2022-09-19 NOTE — TELEPHONE ENCOUNTER
----- Message from Alma Mercado sent at 9/19/2022 11:44 AM CDT -----  Regarding: [facility]  Type:  Needs Medical Advice    Who Called: Lizeth @ [facility] x-ray  Would the patient rather a call back or a response via [facility]? C/b  Best Call Back Number: [phone]  Additional Information: pt is at the facility waiting need a c/b as soon as possible  Thank you

## 2022-09-20 ENCOUNTER — TELEPHONE (OUTPATIENT)
Dept: INTERNAL MEDICINE | Facility: CLINIC | Age: 77
End: 2022-09-20
Payer: MEDICARE

## 2022-09-20 NOTE — TELEPHONE ENCOUNTER
Spoke to pt's daughter and she stated that pt is very confused and lost taking the seroquel at 1/2 a tablet at night only. Daughter wanted to know if they could go to a whole tablet QD?

## 2022-09-20 NOTE — TELEPHONE ENCOUNTER
----- Message from Carlitos Simmons sent at 9/20/2022  3:09 PM CDT -----  .Type:  Needs Medical Advice    Who Called: Patient's daughter, Barbie  Symptoms (please be specific):    How long has patient had these symptoms:    Pharmacy name and phone #:    Would the patient rather a call back or a response via [facility]?   Best Call Back Number: [phone]  Additional Information: The daughter has called in and wanted to let the doctor know that the medication that the doctor prescribed is making her mother feel worst. She is completely confused. .

## 2022-09-21 ENCOUNTER — TELEPHONE (OUTPATIENT)
Dept: INTERNAL MEDICINE | Facility: CLINIC | Age: 77
End: 2022-09-21
Payer: MEDICARE

## 2022-09-21 NOTE — TELEPHONE ENCOUNTER
----- Message from Dewey Cazares MD sent at 9/19/2022  7:00 PM CDT -----  Regarding: FW:  No acute abnormalities noted in the lumbar spine.  ----- Message -----  From: Interface, Rad Results In  Sent: 9/19/2022   5:41 PM CDT  To: Dewey Cazares MD

## 2022-10-03 ENCOUNTER — HOSPITAL ENCOUNTER (OUTPATIENT)
Dept: RADIOLOGY | Facility: HOSPITAL | Age: 77
Discharge: HOME | End: 2022-10-03
Attending: INTERNAL MEDICINE
Payer: MEDICARE

## 2022-10-03 DIAGNOSIS — Z12.31 SCREENING MAMMOGRAM FOR BREAST CANCER: ICD-10-CM

## 2022-10-03 PROBLEM — Z00.00 MEDICARE ANNUAL WELLNESS VISIT, SUBSEQUENT: Status: RESOLVED | Noted: 2022-06-28 | Resolved: 2022-10-03

## 2022-10-03 PROCEDURE — 77067 SCR MAMMO BI INCL CAD: CPT | Mod: TC

## 2022-10-03 PROCEDURE — 77063 BREAST TOMOSYNTHESIS BI: CPT | Mod: 26,,, | Performed by: RADIOLOGY

## 2022-10-03 PROCEDURE — 77063 MAMMO DIGITAL SCREENING BILAT WITH TOMO: ICD-10-PCS | Mod: 26,,, | Performed by: RADIOLOGY

## 2022-10-03 PROCEDURE — 77067 MAMMO DIGITAL SCREENING BILAT WITH TOMO: ICD-10-PCS | Mod: 26,,, | Performed by: RADIOLOGY

## 2022-10-03 PROCEDURE — 77067 SCR MAMMO BI INCL CAD: CPT | Mod: 26,,, | Performed by: RADIOLOGY

## 2022-10-21 ENCOUNTER — TELEPHONE (OUTPATIENT)
Dept: INTERNAL MEDICINE | Facility: CLINIC | Age: 77
End: 2022-10-21
Payer: MEDICARE

## 2022-10-21 DIAGNOSIS — F41.9 ANXIETY: Primary | ICD-10-CM

## 2022-10-21 RX ORDER — QUETIAPINE FUMARATE 25 MG/1
TABLET, FILM COATED ORAL
Qty: 90 TABLET | Refills: 3
Start: 2022-10-21 | End: 2022-11-09 | Stop reason: SDUPTHER

## 2022-10-21 NOTE — TELEPHONE ENCOUNTER
----- Message from Radha Lane sent at 10/21/2022 10:45 AM CDT -----  Pt's daughter called stating the Seroquel didn't go through, please resend   Seroquel 25MG, Qty: 90, [facility] ([address] [facility] [place])

## 2022-10-24 ENCOUNTER — TELEPHONE (OUTPATIENT)
Dept: INTERNAL MEDICINE | Facility: CLINIC | Age: 77
End: 2022-10-24
Payer: MEDICARE

## 2022-10-25 ENCOUNTER — TELEPHONE (OUTPATIENT)
Dept: ADMINISTRATIVE | Facility: HOSPITAL | Age: 77
End: 2022-10-25
Payer: MEDICARE

## 2022-10-25 NOTE — TELEPHONE ENCOUNTER
----- Message from Emil Sexton MA sent at 10/25/2022 10:41 AM CDT -----  Regarding: FW: Returning call    ----- Message -----  From: Lisa Sinclair  Sent: 10/25/2022   8:22 AM CDT  To: [facility] Staff  Subject: Returning call                                   .Type:  Patient Returning Call    Who Called:pt's daughter  Who Left Message for Patient:nurse  Does the patient know what this is regarding?:pt's daughter cancelled her mothe'r appointment for 10/25 and would like to bring her mother in for the cancelled appointment, please give her a call to let her know if she can still bring her in for appointment.   Would the patient rather a call back or a response via [facility]? Call back   Best Call Back Number:[phone] ([facility])  Additional Information: I also set an instant message in teams.

## 2022-11-01 PROBLEM — R29.6 FALLS FREQUENTLY: Status: ACTIVE | Noted: 2022-11-01

## 2022-11-04 RX ORDER — METHOCARBAMOL 500 MG/1
500 TABLET, FILM COATED ORAL 4 TIMES DAILY
Qty: 40 TABLET | Refills: 0 | Status: SHIPPED | OUTPATIENT
Start: 2022-11-04 | End: 2022-12-02

## 2022-11-04 NOTE — TELEPHONE ENCOUNTER
----- Message from Radha Lane sent at 11/4/2022 10:51 AM CDT -----  Methocarbamol 500MG, Qty: 30, [facility] ([place])

## 2022-11-09 ENCOUNTER — OFFICE VISIT (OUTPATIENT)
Dept: INTERNAL MEDICINE | Facility: CLINIC | Age: 77
End: 2022-11-09
Payer: MEDICARE

## 2022-11-09 VITALS
WEIGHT: 141.81 LBS | TEMPERATURE: 98 F | DIASTOLIC BLOOD PRESSURE: 84 MMHG | HEART RATE: 60 BPM | HEIGHT: 60 IN | BODY MASS INDEX: 27.84 KG/M2 | SYSTOLIC BLOOD PRESSURE: 122 MMHG | OXYGEN SATURATION: 98 %

## 2022-11-09 DIAGNOSIS — F41.9 ANXIETY: ICD-10-CM

## 2022-11-09 DIAGNOSIS — G30.0 MODERATE EARLY ONSET ALZHEIMER'S DEMENTIA WITH ANXIETY: Primary | ICD-10-CM

## 2022-11-09 DIAGNOSIS — R29.6 FALLS FREQUENTLY: ICD-10-CM

## 2022-11-09 DIAGNOSIS — F02.B4 MODERATE EARLY ONSET ALZHEIMER'S DEMENTIA WITH ANXIETY: Primary | ICD-10-CM

## 2022-11-09 PROCEDURE — 99214 OFFICE O/P EST MOD 30 MIN: CPT | Mod: ,,, | Performed by: NURSE PRACTITIONER

## 2022-11-09 PROCEDURE — 99214 PR OFFICE/OUTPT VISIT, EST, LEVL IV, 30-39 MIN: ICD-10-PCS | Mod: ,,, | Performed by: NURSE PRACTITIONER

## 2022-11-09 RX ORDER — QUETIAPINE FUMARATE 25 MG/1
TABLET, FILM COATED ORAL
Qty: 90 TABLET | Refills: 3
Start: 2022-11-09 | End: 2022-11-10 | Stop reason: SDUPTHER

## 2022-11-09 NOTE — PROGRESS NOTES
Patient ID: [id number]     Chief Complaint: Black Eye (Right eye 10/30), Insomnia, and Pain        HPI:     Grace Allen is a 77 y.o. female here today for a problem visit. Accompanied by daughter who reports significant cognitive decline over the past few months. Switching Neurologist from Dr. Montana in [place] to Dr. Parsons. Has appt with Dr. Parsons next month. Daughter is complaining of hallucinations that occur throughout the day and night. For instance, she prepared dinner and drinks for people that were on television last night. She has not been taking her Seroquel as she thought this was to be discontinued. She has a sitter that stays with her throughout the day, then the daughter assumes care after work. [facility] but did not want to transfer care over to [facility]. Daughter reports coming home to her mother with a right sided black eye about 2 weeks ago. No witnessed trauma. Mother cannot recall any inciting event. She has since changed sitters. No other complaints today.         ----------------------------  Alzheimer's dementia  CAD (coronary artery disease)  Dyslipidemia  Hypertension     Past Surgical History:   Procedure Laterality Date    CHOLECYSTECTOMY         Review of patient's allergies indicates:   Allergen Reactions    Adhesive tape-silicones        Outpatient Medications Marked as Taking for the 11/9/22 encounter (Office Visit) with ERYN Chand   Medication Sig Dispense Refill    aspirin 81 MG Chew Take 81 mg by mouth once daily at 6am.      atorvastatin (LIPITOR) 20 MG tablet Take 20 mg by mouth once daily at 6am.      memantine (NAMENDA) 5 MG Tab Take 1 tablet (5 mg total) by mouth 2 (two) times daily. (Patient taking differently: Take 5 mg by mouth once daily. At night) 60 tablet 11    methocarbamoL (ROBAXIN) 500 MG Tab Take 1 tablet (500 mg total) by mouth 4 (four) times daily. for 10 days 40 tablet 0    metoprolol succinate (TOPROL-XL) 25 MG 24 hr  tablet Take 12.5 mg by mouth once daily.      pantoprazole (PROTONIX) 40 MG tablet Take 40 mg by mouth once daily at 6am.      sertraline (ZOLOFT) 100 MG tablet Take 150 mg by mouth once daily.      vitamin D (VITAMIN D3) 1000 units Tab Take 5,000 Units by mouth.         Social History     Socioeconomic History    Marital status:    Tobacco Use    Smoking status: Never    Smokeless tobacco: Never   Substance and Sexual Activity    Alcohol use: Not Currently    Drug use: Never    Sexual activity: Not Currently        Family History   Problem Relation Age of Onset    Diabetes Mother     Lung cancer Father         Patient Care Team:  Dewey Cazares MD as PCP - General (Internal Medicine)  Isabela Ambriz MD as Consulting Physician (Cardiology)     Subjective:     ROS    See HPI for details    Constitutional: Denies Change in appetite. Denies Chills. Denies Fever. Denies Night sweats.  ENT: Denies Decreased hearing. Denies Sore throat. Denies Swollen glands.  Respiratory: Denies Cough. Denies Shortness of breath. Denies Shortness of breath with exertion. Denies Wheezing.  Cardiovascular: DeniesChest pain at rest. Denies Chest pain with exertion. Denies Irregular heartbeat. Denies Palpitations. Denies Edema.  Gastrointestinal: Denies Abdominal pain. DeniesDiarrhea. Denies Nausea. Denies Vomiting. Denies Hematemesis or Hematochezia.  Genitourinary: Denies Dysuria. Denies Urinary frequency. Denies Urinary urgency. Denies Blood in urine.  Musculoskeletal: Denies Painful joints. Denies Weakness.  Integumentary: Denies Rash. Denies Itching. Denies Dry skin.  Neurologic: Denies Dizziness. Denies Fainting. Denies Headache.  Psychiatric: Denies Depression. Denies Anxiety. Denies Suicidal/Homicidal ideations.    All Other ROS: Negative except as stated in HPI.       Objective:     /84   Pulse 60   Temp 98.2 °F (36.8 °C)   Ht 5' (1.524 m)   Wt 64.3 kg (141 lb 12.8 oz)   SpO2 98%   BMI 27.69 kg/m²      Physical Exam    General: Alert and oriented, No acute distress.  Head: Normocephalic, Atraumatic.  Eye: Pupils are equal, round and reactive to light, Extraocular movements are intact, Sclera non-icteric. Old ecchymosis surrounding right eye.   Ears/Nose/Throat: Normal, Mucosa moist,Clear.  Neck/Thyroid: Supple, Non-tender, No carotid bruit, No lymphadenopathy, No JVD, Full range of motion.  Respiratory: Clear to auscultation bilaterally; No wheezes, rales or rhonchi,Non-labored respirations, Symmetrical chest wall expansion.  Cardiovascular: Regular rate and rhythm, S1/S2 normal, No murmurs, rubs or gallops.  Gastrointestinal: Soft, Non-tender, Non-distended, Normal bowel sounds, No palpable organomegaly.  Musculoskeletal: Normal range of motion.  Integumentary: Warm, Dry, Intact, No suspicious lesions or rashes.  Extremities: No clubbing, cyanosis or edema  Neurologic: No focal deficits, Cranial Nerves II-XII are grossly intact, Motor strength normal upper and lower extremities, Sensory exam intact.  Psychiatric: Normal interaction, Coherent speech, Euthymic mood, Appropriate affect         Assessment:       ICD-10-CM ICD-9-CM   1. Moderate early onset Alzheimer's dementia with anxiety  G30.0 331.0    F02.B4 294.11   2. Anxiety  F41.9 300.00   3. Falls frequently  R29.6 V15.88        Plan:     1. Moderate early onset Alzheimer's dementia with anxiety  Assessment & Plan:  Resume Seroquel at 12.5mg in the evening.  Follow up with Neuro as scheduled for 12/2022.  Will look into Home Health/resources available to assist her daughter.        2. Anxiety  -     QUEtiapine (SEROQUEL) 25 MG Tab; 1/2 tablet at bedtime  Dispense: 90 tablet; Refill: 3    3. Falls frequently  Assessment & Plan:  Declines use of assistive devices.             Medication List with Changes/Refills   Current Medications    ASPIRIN 81 MG CHEW    Take 81 mg by mouth once daily at 6am.       Start Date: --        End Date: --    ATORVASTATIN  (LIPITOR) 20 MG TABLET    Take 20 mg by mouth once daily at 6am.       Start Date: 4/8/2022  End Date: --    MEMANTINE (NAMENDA) 5 MG TAB    Take 1 tablet (5 mg total) by mouth 2 (two) times daily.       Start Date: 8/19/2022 End Date: 8/19/2023    METHOCARBAMOL (ROBAXIN) 500 MG TAB    Take 1 tablet (500 mg total) by mouth 4 (four) times daily. for 10 days       Start Date: 11/4/2022 End Date: 11/14/2022    METOPROLOL SUCCINATE (TOPROL-XL) 25 MG 24 HR TABLET    Take 12.5 mg by mouth once daily.       Start Date: 4/11/2022 End Date: --    PANTOPRAZOLE (PROTONIX) 40 MG TABLET    Take 40 mg by mouth once daily at 6am.       Start Date: --        End Date: --    SERTRALINE (ZOLOFT) 100 MG TABLET    Take 150 mg by mouth once daily.       Start Date: 1/6/2022  End Date: --    VITAMIN D (VITAMIN D3) 1000 UNITS TAB    Take 5,000 Units by mouth.       Start Date: --        End Date: --   Changed and/or Refilled Medications    Modified Medication Previous Medication    QUETIAPINE (SEROQUEL) 25 MG TAB QUEtiapine (SEROQUEL) 25 MG Tab       1/2 tablet at bedtime    1/2 tablet at bedtime       Start Date: 11/9/2022 End Date: --    Start Date: 10/21/2022End Date: 11/9/2022          Follow up in about 4 weeks (around 12/7/2022) for Routine Follow Up. In addition to their scheduled follow up, the patient has also been instructed to follow up on as needed basis.

## 2022-11-09 NOTE — ASSESSMENT & PLAN NOTE
Resume Seroquel at 12.5mg in the evening.  Follow up with Neuro as scheduled for 12/2022.  Will look into Home Health/resources available to assist her daughter.

## 2022-11-10 DIAGNOSIS — F41.9 ANXIETY: ICD-10-CM

## 2022-11-10 RX ORDER — QUETIAPINE FUMARATE 25 MG/1
TABLET, FILM COATED ORAL
Qty: 90 TABLET | Refills: 3 | Status: SHIPPED | OUTPATIENT
Start: 2022-11-10 | End: 2022-12-07 | Stop reason: SDUPTHER

## 2022-11-10 RX ORDER — QUETIAPINE FUMARATE 25 MG/1
TABLET, FILM COATED ORAL
Qty: 90 TABLET | Refills: 3
Start: 2022-11-10 | End: 2022-11-10 | Stop reason: SDUPTHER

## 2022-12-07 ENCOUNTER — OFFICE VISIT (OUTPATIENT)
Dept: INTERNAL MEDICINE | Facility: CLINIC | Age: 77
End: 2022-12-07
Payer: MEDICARE

## 2022-12-07 VITALS
WEIGHT: 145.5 LBS | HEIGHT: 60 IN | HEART RATE: 68 BPM | SYSTOLIC BLOOD PRESSURE: 130 MMHG | BODY MASS INDEX: 28.57 KG/M2 | OXYGEN SATURATION: 97 % | DIASTOLIC BLOOD PRESSURE: 72 MMHG

## 2022-12-07 DIAGNOSIS — F41.9 ANXIETY: ICD-10-CM

## 2022-12-07 DIAGNOSIS — G30.0 MODERATE EARLY ONSET ALZHEIMER'S DEMENTIA WITH ANXIETY: Primary | ICD-10-CM

## 2022-12-07 DIAGNOSIS — F02.B4 MODERATE EARLY ONSET ALZHEIMER'S DEMENTIA WITH ANXIETY: Primary | ICD-10-CM

## 2022-12-07 PROCEDURE — 99214 PR OFFICE/OUTPT VISIT, EST, LEVL IV, 30-39 MIN: ICD-10-PCS | Mod: ,,, | Performed by: NURSE PRACTITIONER

## 2022-12-07 PROCEDURE — 99214 OFFICE O/P EST MOD 30 MIN: CPT | Mod: ,,, | Performed by: NURSE PRACTITIONER

## 2022-12-07 RX ORDER — FLUTICASONE PROPIONATE 50 MCG
1 SPRAY, SUSPENSION (ML) NASAL DAILY
Qty: 16 G | Refills: 5 | Status: SHIPPED | OUTPATIENT
Start: 2022-12-07

## 2022-12-07 RX ORDER — DONEPEZIL HYDROCHLORIDE 10 MG/1
10 TABLET, FILM COATED ORAL NIGHTLY
Qty: 30 TABLET | Refills: 5 | Status: SHIPPED | OUTPATIENT
Start: 2022-12-07 | End: 2023-08-08

## 2022-12-07 RX ORDER — QUETIAPINE FUMARATE 25 MG/1
25 TABLET, FILM COATED ORAL NIGHTLY
Qty: 90 TABLET | Refills: 3 | Status: ON HOLD | OUTPATIENT
Start: 2022-12-07 | End: 2023-05-19 | Stop reason: HOSPADM

## 2022-12-07 NOTE — PROGRESS NOTES
Patient ID: [id number]     Chief Complaint: Follow-up        HPI:     Grace Allen is a 77 y.o. female here today for a problem visit. Accompanied by daughter who reports significant cognitive decline over the past few months. Resumed Seroquel at last visit but no significant improvement in agitation or hallucinations at night. Her daughter has looked into several programs for her but she does not qualify for any assistance. No other complaints today.         ----------------------------  Alzheimer's dementia  CAD (coronary artery disease)  Dyslipidemia  Hypertension     Past Surgical History:   Procedure Laterality Date    CHOLECYSTECTOMY         Review of patient's allergies indicates:   Allergen Reactions    Adhesive tape-silicones        Outpatient Medications Marked as Taking for the 12/7/22 encounter (Office Visit) with ERYN Chand   Medication Sig Dispense Refill    aspirin 81 MG Chew Take 81 mg by mouth once daily at 6am.      atorvastatin (LIPITOR) 20 MG tablet Take 20 mg by mouth once daily at 6am.      memantine (NAMENDA) 5 MG Tab Take 1 tablet (5 mg total) by mouth 2 (two) times daily. (Patient taking differently: Take 5 mg by mouth once daily. At night) 60 tablet 11    methocarbamoL (ROBAXIN) 500 MG Tab Take 1 tablet (500 mg total) by mouth 4 (four) times daily as needed (pain). 40 tablet 0    metoprolol succinate (TOPROL-XL) 25 MG 24 hr tablet Take 12.5 mg by mouth once daily.      pantoprazole (PROTONIX) 40 MG tablet Take 40 mg by mouth once daily at 6am.      sertraline (ZOLOFT) 100 MG tablet Take 150 mg by mouth once daily. Taking 1 1/2 tab.      vitamin D (VITAMIN D3) 1000 units Tab Take 5,000 Units by mouth.      [DISCONTINUED] QUEtiapine (SEROQUEL) 25 MG Tab 1/2 tablet at bedtime 90 tablet 3       Social History     Socioeconomic History    Marital status:    Tobacco Use    Smoking status: Never    Smokeless tobacco: Never   Substance and Sexual Activity    Alcohol use: Not  Currently    Drug use: Never    Sexual activity: Not Currently        Family History   Problem Relation Age of Onset    Diabetes Mother     Lung cancer Father         Patient Care Team:  Dewey Cazares MD as PCP - General (Internal Medicine)  Isabela Ambriz MD as Consulting Physician (Cardiology)     Subjective:     ROS    See HPI for details    Constitutional: Denies Change in appetite. Denies Chills. Denies Fever. Denies Night sweats.  ENT: Denies Decreased hearing. Denies Sore throat. Denies Swollen glands.  Respiratory: Denies Cough. Denies Shortness of breath. Denies Shortness of breath with exertion. Denies Wheezing.  Cardiovascular: DeniesChest pain at rest. Denies Chest pain with exertion. Denies Irregular heartbeat. Denies Palpitations. Denies Edema.  Gastrointestinal: Denies Abdominal pain. DeniesDiarrhea. Denies Nausea. Denies Vomiting. Denies Hematemesis or Hematochezia.  Genitourinary: Denies Dysuria. Denies Urinary frequency. Denies Urinary urgency. Denies Blood in urine.  Musculoskeletal: Denies Painful joints. Denies Weakness.  Integumentary: Denies Rash. Denies Itching. Denies Dry skin.  Neurologic: Denies Dizziness. Denies Fainting. Denies Headache.  Psychiatric: Denies Depression. Denies Anxiety. Denies Suicidal/Homicidal ideations.    All Other ROS: Negative except as stated in HPI.       Objective:     /72   Pulse 68   Ht 5' (1.524 m)   Wt 66 kg (145 lb 8 oz)   SpO2 97%   BMI 28.42 kg/m²     Physical Exam    General: Alert, No acute distress.  Head: Normocephalic, Atraumatic.  Eye: Pupils are equal, round and reactive to light, Extraocular movements are intact, Sclera non-icteric. Old ecchymosis surrounding right eye.   Ears/Nose/Throat: Normal, Mucosa moist,Clear.  Neck/Thyroid: Supple, Non-tender, No carotid bruit, No lymphadenopathy, No JVD, Full range of motion.  Respiratory: Clear to auscultation bilaterally; No wheezes, rales or rhonchi,Non-labored respirations,  Symmetrical chest wall expansion.  Cardiovascular: Regular rate and rhythm, S1/S2 normal, No murmurs, rubs or gallops.  Gastrointestinal: Soft, Non-tender, Non-distended, Normal bowel sounds, No palpable organomegaly.  Musculoskeletal: Normal range of motion.  Integumentary: Warm, Dry, Intact, No suspicious lesions or rashes.  Extremities: No clubbing, cyanosis or edema  Neurologic: No focal deficits, Cranial Nerves II-XII are grossly intact, Motor strength normal upper and lower extremities, Sensory exam intact.  Psychiatric: Normal interaction, Coherent speech, Euthymic mood, Appropriate affect         Assessment:       ICD-10-CM ICD-9-CM   1. Moderate early onset Alzheimer's dementia with anxiety  G30.0 331.0    F02.B4 294.11   2. Anxiety  F41.9 300.00          Plan:     1. Moderate early onset Alzheimer's dementia with anxiety  Assessment & Plan:  Increase Seroquel to 25mg in the evening. Willing to try Aricept again at 5mg (cut 10mg in half). Will increase after 4-6 weeks if tolerating well. Continue Namenda 5mg at HS.   Follow up with Neuro as scheduled for 12/2022.  List of resources given.        2. Anxiety  -     QUEtiapine (SEROQUEL) 25 MG Tab; Take 1 tablet (25 mg total) by mouth every evening. 1/2 tablet at bedtime  Dispense: 90 tablet; Refill: 3    Other orders  -     donepeziL (ARICEPT) 10 MG tablet; Take 1 tablet (10 mg total) by mouth every evening.  Dispense: 30 tablet; Refill: 5  -     fluticasone propionate (FLONASE) 50 mcg/actuation nasal spray; 1 spray (50 mcg total) by Each Nostril route once daily.  Dispense: 16 g; Refill: 5           Medication List with Changes/Refills   New Medications    DONEPEZIL (ARICEPT) 10 MG TABLET    Take 1 tablet (10 mg total) by mouth every evening.       Start Date: 12/7/2022 End Date: 12/7/2023    FLUTICASONE PROPIONATE (FLONASE) 50 MCG/ACTUATION NASAL SPRAY    1 spray (50 mcg total) by Each Nostril route once daily.       Start Date: 12/7/2022 End Date: --    Current Medications    ASPIRIN 81 MG CHEW    Take 81 mg by mouth once daily at 6am.       Start Date: --        End Date: --    ATORVASTATIN (LIPITOR) 20 MG TABLET    Take 20 mg by mouth once daily at 6am.       Start Date: 4/8/2022  End Date: --    MEMANTINE (NAMENDA) 5 MG TAB    Take 1 tablet (5 mg total) by mouth 2 (two) times daily.       Start Date: 8/19/2022 End Date: 8/19/2023    METHOCARBAMOL (ROBAXIN) 500 MG TAB    Take 1 tablet (500 mg total) by mouth 4 (four) times daily as needed (pain).       Start Date: 12/2/2022 End Date: --    METOPROLOL SUCCINATE (TOPROL-XL) 25 MG 24 HR TABLET    Take 12.5 mg by mouth once daily.       Start Date: 4/11/2022 End Date: --    PANTOPRAZOLE (PROTONIX) 40 MG TABLET    Take 40 mg by mouth once daily at 6am.       Start Date: --        End Date: --    SERTRALINE (ZOLOFT) 100 MG TABLET    Take 150 mg by mouth once daily. Taking 1 1/2 tab.       Start Date: 1/6/2022  End Date: --    VITAMIN D (VITAMIN D3) 1000 UNITS TAB    Take 5,000 Units by mouth.       Start Date: --        End Date: --   Changed and/or Refilled Medications    Modified Medication Previous Medication    QUETIAPINE (SEROQUEL) 25 MG TAB QUEtiapine (SEROQUEL) 25 MG Tab       Take 1 tablet (25 mg total) by mouth every evening. 1/2 tablet at bedtime    1/2 tablet at bedtime       Start Date: 12/7/2022 End Date: --    Start Date: 11/10/2022End Date: 12/7/2022   Discontinued Medications    METHOCARBAMOL (ROBAXIN) 500 MG TAB    Take 1 tablet (500 mg total) by mouth 4 (four) times daily as needed (pain). for ten days       Start Date: 12/2/2022 End Date: 12/7/2022          Follow up in about 4 weeks (around 1/4/2023) for Routine Follow Up. In addition to their scheduled follow up, the patient has also been instructed to follow up on as needed basis.

## 2022-12-07 NOTE — ASSESSMENT & PLAN NOTE
Increase Seroquel to 25mg in the evening. Willing to try Aricept again at 5mg (cut 10mg in half). Will increase after 4-6 weeks if tolerating well. Continue Namenda 5mg at HS.   Follow up with Neuro as scheduled for 12/2022.  List of resources given.

## 2022-12-12 ENCOUNTER — TELEPHONE (OUTPATIENT)
Dept: INTERNAL MEDICINE | Facility: CLINIC | Age: 77
End: 2022-12-12
Payer: MEDICARE

## 2022-12-12 NOTE — TELEPHONE ENCOUNTER
----- Message from ERYN Chand sent at 12/12/2022 11:45 AM CST -----  We can try discontinuing Seroquel and starting Trazodone 25mg at night. Has she tried this before?  ----- Message -----  From: Francis Malhotra MA  Sent: 12/12/2022  10:52 AM CST  To: ERYN Chand    Pt daughter called stated the new medication that was prescribed on 12/07/(Seroquel) pt daughter stated she took her mother off that medication 3 days ago because the nightmares got worse. Please advise?

## 2022-12-28 ENCOUNTER — OFFICE VISIT (OUTPATIENT)
Dept: NEUROLOGY | Facility: CLINIC | Age: 77
End: 2022-12-28
Payer: MEDICARE

## 2022-12-28 VITALS
BODY MASS INDEX: 28.47 KG/M2 | WEIGHT: 145 LBS | DIASTOLIC BLOOD PRESSURE: 68 MMHG | SYSTOLIC BLOOD PRESSURE: 102 MMHG | RESPIRATION RATE: 18 BRPM | HEIGHT: 60 IN

## 2022-12-28 DIAGNOSIS — F03.90 DEMENTIA WITHOUT BEHAVIORAL DISTURBANCE: ICD-10-CM

## 2022-12-28 DIAGNOSIS — F02.C18 SEVERE LATE ONSET ALZHEIMER'S DEMENTIA WITH OTHER BEHAVIORAL DISTURBANCE: Primary | ICD-10-CM

## 2022-12-28 DIAGNOSIS — G30.1 SEVERE LATE ONSET ALZHEIMER'S DEMENTIA WITH OTHER BEHAVIORAL DISTURBANCE: Primary | ICD-10-CM

## 2022-12-28 PROCEDURE — 99214 OFFICE O/P EST MOD 30 MIN: CPT | Mod: PBBFAC | Performed by: SPECIALIST

## 2022-12-28 PROCEDURE — 99999 PR PBB SHADOW E&M-EST. PATIENT-LVL IV: CPT | Mod: PBBFAC,,, | Performed by: SPECIALIST

## 2022-12-28 PROCEDURE — 99204 PR OFFICE/OUTPT VISIT, NEW, LEVL IV, 45-59 MIN: ICD-10-PCS | Mod: S$PBB,,, | Performed by: SPECIALIST

## 2022-12-28 PROCEDURE — 99204 OFFICE O/P NEW MOD 45 MIN: CPT | Mod: S$PBB,,, | Performed by: SPECIALIST

## 2022-12-28 PROCEDURE — 99999 PR PBB SHADOW E&M-EST. PATIENT-LVL IV: ICD-10-PCS | Mod: PBBFAC,,, | Performed by: SPECIALIST

## 2022-12-28 RX ORDER — CETIRIZINE HYDROCHLORIDE 10 MG/1
10 TABLET ORAL DAILY
COMMUNITY

## 2022-12-28 RX ORDER — MEMANTINE HYDROCHLORIDE 5 MG/1
5 TABLET ORAL 2 TIMES DAILY
Qty: 60 TABLET | Refills: 11 | Status: SHIPPED | OUTPATIENT
Start: 2022-12-28 | End: 2023-11-22

## 2022-12-28 NOTE — PROGRESS NOTES
"Subjective:       Patient ID: Grace Allen is a 77 y.o. female.    Chief Complaint: dementia new eval     HPI:            Establish Care (Dementia w/o behavioral disturbance )  Was living in [place] and had seen Achi    Now living with dtr here in [place]     More diff communicating    Freq asks to go back to her home   some depression     notes may also be on facesheet for HPI, ROS, and other sections     Review of Systems  Off Aricept "really hallucinating w horrible dreams"         Social History     Socioeconomic History    Marital status:    Tobacco Use    Smoking status: Never    Smokeless tobacco: Never   Substance and Sexual Activity    Alcohol use: Not Currently    Drug use: Never    Sexual activity: Not Currently     ----------------------------  Alzheimer's dementia  CAD (coronary artery disease)  Dyslipidemia  Hypertension      Current Outpatient Medications:     aspirin 81 MG Chew, Take 81 mg by mouth once daily at 6am., Disp: , Rfl:     atorvastatin (LIPITOR) 20 MG tablet, Take 20 mg by mouth once daily at 6am., Disp: , Rfl:     cetirizine (ZYRTEC) 10 MG tablet, Take 10 mg by mouth once daily., Disp: , Rfl:     fluticasone propionate (FLONASE) 50 mcg/actuation nasal spray, 1 spray (50 mcg total) by Each Nostril route once daily., Disp: 16 g, Rfl: 5    memantine (NAMENDA) 5 MG Tab, Take 1 tablet (5 mg total) by mouth 2 (two) times daily., Disp: 60 tablet, Rfl: 11    methocarbamoL (ROBAXIN) 500 MG Tab, Take 1 tablet (500 mg total) by mouth 4 (four) times daily as needed (pain)., Disp: 40 tablet, Rfl: 0    metoprolol succinate (TOPROL-XL) 25 MG 24 hr tablet, Take 12.5 mg by mouth once daily., Disp: , Rfl:     pantoprazole (PROTONIX) 40 MG tablet, Take 40 mg by mouth once daily at 6am., Disp: , Rfl:     QUEtiapine (SEROQUEL) 25 MG Tab, Take 1 tablet (25 mg total) by mouth every evening. 1/2 tablet at bedtime, Disp: 90 tablet, Rfl: 3    sertraline (ZOLOFT) 100 MG tablet, Take 150 mg by mouth once " daily. Taking 1 1/2 tab., Disp: , Rfl:     vitamin D (VITAMIN D3) 1000 units Tab, Take 5,000 Units by mouth., Disp: , Rfl:     donepeziL (ARICEPT) 10 MG tablet, Take 1 tablet (10 mg total) by mouth every evening. (Patient not taking: Reported on 12/28/2022), Disp: 30 tablet, Rfl: 5     Objective:        Exam:   /68 (BP Location: Left arm, Patient Position: Sitting, BP Method: Small (Manual))   Resp 18   Ht 5' (1.524 m)   Wt 65.8 kg (145 lb)   BMI 28.32 kg/m²     General Exam  if accompanied, by__ dtr   body habitus_ Body mass index is 28.32 kg/m².    mental status_alert     Neurological:  cortical function__ impaired   Speech __ cannot communicate   cranial nerves:  CN 2 VF_ok   fundi_   CN 3, 4, 6 EOMs_ok  CN 3, pupils_ok    CN 7_no lower face asymmetry  CN 8_hearing _  CN 12 tongue_ok    Motor__ seems normal   tone:   muscle stretch reflexes__  Vib sens_  Pin sens_  plantars__  tremor: _ none   coordination: _  gait_ good with good dancing type pivot   Romberg:     Neuroimaging:  None available to me  today     Labs: Cr in aug 1.4; anemia Hct 34      _._ new patient   ___ multiple issues/ diagnoses or problems  [if not enumerated in note then discussed in encounter but not documented]    complexity of data     __high ._mod   __ images and reports reviewed:  _._ hx obtained from family or accompaniment:   __other studies reviewed   __studies ordered __   _._studies considered or discussed but not ordered __ brain imaging study;  I'm  sure she's had and exam non focal  so held off   __DDx discussed __    Risks    _._high _mod   _._ (possible or definite) neurodegenerative condition and inherent progression  __ fall risk  __ driving discussed   __ diagnosis unclear or DDx wide making risk uncertain to high  __other:    MDM/Medical Decision Making      _.moderate         Assessment/Plan:       Problem List Items Addressed This Visit          Neuro    Alzheimer's dementia - Primary         Other comments/  follow up:        Imaging orders(if any):   No orders of the defined types were placed in this encounter.     Medications Ordered This Encounter   Medications    memantine (NAMENDA) 5 MG Tab     Sig: Take 1 tablet (5 mg total) by mouth 2 (two) times daily.     Dispense:  60 tablet     Refill:  11        Dr Fernandez could increase her memantine to 10mg bid in future     Followup __ not sheduled here at this time     MD KAROL AndersonA

## 2023-01-03 ENCOUNTER — TELEPHONE (OUTPATIENT)
Dept: INTERNAL MEDICINE | Facility: CLINIC | Age: 78
End: 2023-01-03
Payer: MEDICARE

## 2023-01-03 NOTE — TELEPHONE ENCOUNTER
----- Message from Emil Sexton MA sent at 1/3/2023 11:13 AM CST -----  Regarding: PV 1/10/2023 @ 3:20 Dr. Fernandez  1. Are there any outstanding tasks in the patient's chart? Yes, fasting labs    2. Is there any documentation in the chart? No    3.Has patient been seen in an ER, Urgent care clinic, or been admitted since last visit?  If yes, When, where, and why    4. Has patient seen any other healthcare providers since last visit?  If yes, when, where, and why    5. Has patient had any bloodwork or XR done since last visit?    6. Is patient signed up for patient portal?

## 2023-01-06 ENCOUNTER — LAB VISIT (OUTPATIENT)
Dept: LAB | Facility: HOSPITAL | Age: 78
End: 2023-01-06
Attending: INTERNAL MEDICINE
Payer: MEDICARE

## 2023-01-06 DIAGNOSIS — E78.5 DYSLIPIDEMIA: ICD-10-CM

## 2023-01-06 DIAGNOSIS — I10 HYPERTENSION, UNSPECIFIED TYPE: ICD-10-CM

## 2023-01-06 DIAGNOSIS — D64.9 ANEMIA, UNSPECIFIED TYPE: ICD-10-CM

## 2023-01-06 LAB
ALBUMIN SERPL-MCNC: 4.1 G/DL (ref 3.4–4.8)
ALBUMIN/GLOB SERPL: 1.3 RATIO (ref 1.1–2)
ALP SERPL-CCNC: 75 UNIT/L (ref 40–150)
ALT SERPL-CCNC: 34 UNIT/L (ref 0–55)
AST SERPL-CCNC: 38 UNIT/L (ref 5–34)
BASOPHILS # BLD AUTO: 0.04 X10(3)/MCL (ref 0–0.2)
BASOPHILS NFR BLD AUTO: 0.8 %
BILIRUBIN DIRECT+TOT PNL SERPL-MCNC: 0.5 MG/DL
BUN SERPL-MCNC: 26.5 MG/DL (ref 9.8–20.1)
CALCIUM SERPL-MCNC: 9.8 MG/DL (ref 8.4–10.2)
CHLORIDE SERPL-SCNC: 110 MMOL/L (ref 98–107)
CHOLEST SERPL-MCNC: 171 MG/DL
CHOLEST/HDLC SERPL: 3 {RATIO} (ref 0–5)
CO2 SERPL-SCNC: 27 MMOL/L (ref 23–31)
CREAT SERPL-MCNC: 1.49 MG/DL (ref 0.55–1.02)
EOSINOPHIL # BLD AUTO: 0.34 X10(3)/MCL (ref 0–0.9)
EOSINOPHIL NFR BLD AUTO: 6.4 %
ERYTHROCYTE [DISTWIDTH] IN BLOOD BY AUTOMATED COUNT: 15.1 % (ref 11–14.5)
FERRITIN SERPL-MCNC: 133.64 NG/ML (ref 4.63–204)
FOLATE SERPL-MCNC: 5.3 NG/ML (ref 7–31.4)
GFR SERPLBLD CREATININE-BSD FMLA CKD-EPI: 36 MLS/MIN/1.73/M2
GLOBULIN SER-MCNC: 3.2 GM/DL (ref 2.4–3.5)
GLUCOSE SERPL-MCNC: 88 MG/DL (ref 82–115)
HCT VFR BLD AUTO: 37.1 % (ref 37–47)
HDLC SERPL-MCNC: 63 MG/DL (ref 35–60)
HGB BLD-MCNC: 11.3 GM/DL (ref 12–16)
IMM GRANULOCYTES # BLD AUTO: 0.02 X10(3)/MCL (ref 0–0.04)
IMM GRANULOCYTES NFR BLD AUTO: 0.4 %
IRON SERPL-MCNC: 81 UG/DL (ref 50–170)
LDLC SERPL CALC-MCNC: 95 MG/DL (ref 50–140)
LYMPHOCYTES # BLD AUTO: 1.56 X10(3)/MCL (ref 0.6–4.6)
LYMPHOCYTES NFR BLD AUTO: 29.4 %
MCH RBC QN AUTO: 26.8 PG
MCHC RBC AUTO-ENTMCNC: 30.5 MG/DL (ref 33–36)
MCV RBC AUTO: 87.9 FL (ref 80–94)
MONOCYTES # BLD AUTO: 0.47 X10(3)/MCL (ref 0.1–1.3)
MONOCYTES NFR BLD AUTO: 8.9 %
NEUTROPHILS # BLD AUTO: 2.88 X10(3)/MCL (ref 2.1–9.2)
NEUTROPHILS NFR BLD AUTO: 54.1 %
NRBC BLD AUTO-RTO: 0 % (ref 0–1)
PLATELET # BLD AUTO: 148 X10(3)/MCL (ref 140–371)
PMV BLD AUTO: 10.9 FL (ref 9.4–12.4)
POTASSIUM SERPL-SCNC: 4.3 MMOL/L (ref 3.5–5.1)
PROT SERPL-MCNC: 7.3 GM/DL (ref 5.8–7.6)
RBC # BLD AUTO: 4.22 X10(6)/MCL (ref 4.2–5.4)
RET# (OHS): 0.04 (ref 0.02–0.08)
RETICULOCYTE COUNT AUTOMATED (OLG): 0.95 % (ref 1.1–2.1)
SODIUM SERPL-SCNC: 144 MMOL/L (ref 136–145)
TRIGL SERPL-MCNC: 67 MG/DL (ref 37–140)
VIT B12 SERPL-MCNC: 331 PG/ML (ref 213–816)
VLDLC SERPL CALC-MCNC: 13 MG/DL
WBC # SPEC AUTO: 5.3 X10(3)/MCL (ref 4.5–11.5)

## 2023-01-06 PROCEDURE — 82607 VITAMIN B-12: CPT

## 2023-01-06 PROCEDURE — 85025 COMPLETE CBC W/AUTO DIFF WBC: CPT

## 2023-01-06 PROCEDURE — 36415 COLL VENOUS BLD VENIPUNCTURE: CPT

## 2023-01-06 PROCEDURE — 82728 ASSAY OF FERRITIN: CPT

## 2023-01-06 PROCEDURE — 83540 ASSAY OF IRON: CPT

## 2023-01-06 PROCEDURE — 80053 COMPREHEN METABOLIC PANEL: CPT

## 2023-01-06 PROCEDURE — 80061 LIPID PANEL: CPT

## 2023-01-06 PROCEDURE — 82746 ASSAY OF FOLIC ACID SERUM: CPT

## 2023-01-06 PROCEDURE — 85045 AUTOMATED RETICULOCYTE COUNT: CPT

## 2023-01-10 ENCOUNTER — OFFICE VISIT (OUTPATIENT)
Dept: INTERNAL MEDICINE | Facility: CLINIC | Age: 78
End: 2023-01-10
Payer: MEDICARE

## 2023-01-10 VITALS
WEIGHT: 145 LBS | DIASTOLIC BLOOD PRESSURE: 82 MMHG | HEIGHT: 60 IN | TEMPERATURE: 98 F | RESPIRATION RATE: 16 BRPM | BODY MASS INDEX: 28.47 KG/M2 | SYSTOLIC BLOOD PRESSURE: 126 MMHG | HEART RATE: 65 BPM | OXYGEN SATURATION: 99 %

## 2023-01-10 DIAGNOSIS — E53.8 FOLIC ACID DEFICIENCY: ICD-10-CM

## 2023-01-10 DIAGNOSIS — E53.8 B12 DEFICIENCY: ICD-10-CM

## 2023-01-10 DIAGNOSIS — G30.1 SEVERE LATE ONSET ALZHEIMER'S DEMENTIA WITH OTHER BEHAVIORAL DISTURBANCE: ICD-10-CM

## 2023-01-10 DIAGNOSIS — F02.C18 SEVERE LATE ONSET ALZHEIMER'S DEMENTIA WITH OTHER BEHAVIORAL DISTURBANCE: ICD-10-CM

## 2023-01-10 DIAGNOSIS — I10 HYPERTENSION, UNSPECIFIED TYPE: ICD-10-CM

## 2023-01-10 PROCEDURE — 99214 PR OFFICE/OUTPT VISIT, EST, LEVL IV, 30-39 MIN: ICD-10-PCS | Mod: ,,, | Performed by: INTERNAL MEDICINE

## 2023-01-10 PROCEDURE — 99214 OFFICE O/P EST MOD 30 MIN: CPT | Mod: ,,, | Performed by: INTERNAL MEDICINE

## 2023-01-10 NOTE — PROGRESS NOTES
Subjective:      Patient ID: Grace Allen is a 77 y.o. female.    Chief Complaint: Anemia (6 month F/U)      HPI:    77-year-old -American female here for 6 month revisit  Valerie for Cards  Patient with cognitive decline, followed by Dr Parsons  Has a stress test scheduled on 1-30-22  Folic acid and B12 are low  Has headaches but doesn't eat regularly     Past Medical History:  Past Medical History:   Diagnosis Date    Alzheimer's dementia 8/19/2022    CAD (coronary artery disease)     Dyslipidemia     Hypertension      Past Surgical History:   Procedure Laterality Date    CHOLECYSTECTOMY       Review of patient's allergies indicates:   Allergen Reactions    Adhesive tape-silicones      Current Outpatient Medications on File Prior to Visit   Medication Sig Dispense Refill    aspirin 81 MG Chew Take 81 mg by mouth once daily at 6am.      atorvastatin (LIPITOR) 20 MG tablet Take 20 mg by mouth once daily at 6am.      cetirizine (ZYRTEC) 10 MG tablet Take 10 mg by mouth once daily.      fluticasone propionate (FLONASE) 50 mcg/actuation nasal spray 1 spray (50 mcg total) by Each Nostril route once daily. 16 g 5    methocarbamoL (ROBAXIN) 500 MG Tab Take 1 tablet (500 mg total) by mouth 4 (four) times daily as needed (pain). 40 tablet 0    metoprolol succinate (TOPROL-XL) 25 MG 24 hr tablet Take 12.5 mg by mouth once daily.      pantoprazole (PROTONIX) 40 MG tablet Take 40 mg by mouth once daily at 6am.      QUEtiapine (SEROQUEL) 25 MG Tab Take 1 tablet (25 mg total) by mouth every evening. 1/2 tablet at bedtime 90 tablet 3    sertraline (ZOLOFT) 100 MG tablet Take 150 mg by mouth once daily. Taking 1 1/2 tab.      vitamin D (VITAMIN D3) 1000 units Tab Take 5,000 Units by mouth.      donepeziL (ARICEPT) 10 MG tablet Take 1 tablet (10 mg total) by mouth every evening. (Patient not taking: Reported on 12/28/2022) 30 tablet 5    memantine (NAMENDA) 5 MG Tab Take 1 tablet (5 mg total) by mouth 2 (two) times daily.  (Patient not taking: Reported on 1/10/2023) 60 tablet 11     No current facility-administered medications on file prior to visit.     Social History     Socioeconomic History    Marital status:    Tobacco Use    Smoking status: Never    Smokeless tobacco: Never   Substance and Sexual Activity    Alcohol use: Not Currently    Drug use: Never    Sexual activity: Not Currently     Family History   Problem Relation Age of Onset    Diabetes Mother     Lung cancer Father        Review of Systems  A comprehensive review of systems was performed and was negative with exception of what is documented above.     Objective:   /82 (BP Location: Left arm, Patient Position: Sitting, BP Method: Medium (Manual))   Pulse 65   Temp 97.6 °F (36.4 °C) (Temporal)   Resp 16   Ht 5' (1.524 m)   Wt 65.8 kg (145 lb)   SpO2 99%   BMI 28.32 kg/m²   Physical Exam  General : Alert and oriented, No acute distress, afebrile.  Eye : PERRLA. EOMI. Normal conjunctiva, Sclerae are nonicteric.   Respiratory : Respirations are non-labored and clear to auscultation bilaterally. Symmetrical air entry bilaterally, no crackles, no wheezes, no rhonchi. No cyanosis, no clubbing.  Cardiovascular : Normal rate, Regular rhythm. No murmurs, rubs, or gallops. Pulses are 2+ throughout. No JVD. No Edema.  Gastrointestinal : Soft, nontender, non-distended, bowel sounds are present in all quadrants, no organomegaly, no guarding, no rebound.  Musculoskeletal : Normal range of motion throughout. No muscle tenderness.  Integumentary : Warm, moist, intact.  Neurologic : Alert, Oriented  Psychiatric : Cooperative, Appropriate mood & affect.   Assessment/ Plan:   1. Folic acid deficiency  Assessment & Plan:  Replace folic acid with prenatal multivitamin with folic acid daily    Orders:  -     CBC Auto Differential; Future; Expected date: 07/10/2023  -     Comprehensive Metabolic Panel; Future; Expected date: 07/10/2023  -     Lipid Panel; Future;  Expected date: 07/10/2023  -     TSH; Future; Expected date: 07/10/2023  -     Urinalysis; Future; Expected date: 07/10/2023  -     Folate; Future; Expected date: 01/10/2023  -     Vitamin B12; Future; Expected date: 01/10/2023    2. B12 deficiency  Assessment & Plan:  B complex vitamins daily available over-the-counter, B level currently at 313    Orders:  -     CBC Auto Differential; Future; Expected date: 07/10/2023  -     Comprehensive Metabolic Panel; Future; Expected date: 07/10/2023  -     Lipid Panel; Future; Expected date: 07/10/2023  -     TSH; Future; Expected date: 07/10/2023  -     Urinalysis; Future; Expected date: 07/10/2023  -     Folate; Future; Expected date: 01/10/2023  -     Vitamin B12; Future; Expected date: 01/10/2023    3. Hypertension, unspecified type  Assessment & Plan:  At goal, continue current regimen    Orders:  -     CBC Auto Differential; Future; Expected date: 07/10/2023  -     Comprehensive Metabolic Panel; Future; Expected date: 07/10/2023  -     Lipid Panel; Future; Expected date: 07/10/2023  -     TSH; Future; Expected date: 07/10/2023  -     Urinalysis; Future; Expected date: 07/10/2023  -     Folate; Future; Expected date: 01/10/2023  -     Vitamin B12; Future; Expected date: 01/10/2023    4. Severe late onset Alzheimer's dementia with other behavioral disturbance  Assessment & Plan:  Continue current regimen               Follow up in about 6 months (around 7/10/2023) for WELLNESS, with labs prior to visit, NURSE PRACTITIONER.      An office visit for an established patient was performed. 10 minutes was used for reviewing the patients chart prior to the inoice visit done on that same day. 15 minutes was used during the visit in regards to taking the patient history and physical exam. There was also an additional 5 minutes spent on education and counseling regarding medical conditions, current medications including risk/benefit and side effects/adverse events, vaccine  counseling. After leaving the exam room, the provider then spent an additional 5 minutes completing the electronic health record.    The patient is receptive, expresses understanding and is agreeable to plan. All questions answered; total time spent was 35 minutes.

## 2023-01-17 ENCOUNTER — TELEPHONE (OUTPATIENT)
Dept: INTERNAL MEDICINE | Facility: CLINIC | Age: 78
End: 2023-01-17
Payer: MEDICARE

## 2023-01-17 NOTE — TELEPHONE ENCOUNTER
----- Message from Dewey Cazares MD sent at 1/17/2023 11:13 AM CST -----  No  The b complex they sell in a drop that goes under the tongue  And check the back of the bottle but that b complex drop should have folic acid in it  ----- Message -----  From: Radha Lane  Sent: 1/17/2023  10:56 AM CST  To: Dewey Cazares MD    Pt's daughter called stating you wanted pt to take B Complex, and Prenatal Vit.     Pt having a hard time swolling the Meds. Daughter wanted to know if it is ok for the pt to take   (One a Day 50 plus Gummies) instead

## 2023-01-18 ENCOUNTER — TELEPHONE (OUTPATIENT)
Dept: INTERNAL MEDICINE | Facility: CLINIC | Age: 78
End: 2023-01-18
Payer: MEDICARE

## 2023-01-18 RX ORDER — CLOTRIMAZOLE AND BETAMETHASONE DIPROPIONATE 10; .64 MG/G; MG/G
CREAM TOPICAL 2 TIMES DAILY
Qty: 15 G | Refills: 0 | Status: SHIPPED | OUTPATIENT
Start: 2023-01-18 | End: 2023-03-01

## 2023-01-18 NOTE — TELEPHONE ENCOUNTER
----- Message from Dewey Cazares MD sent at 1/18/2023  3:43 PM CST -----  [facility]  ----- Message -----  From: Radha Lane  Sent: 1/18/2023   3:34 PM CST  To: Dewey Cazares MD    Pt's daughter called asking if something can be called in for the pt, she is itching and burning at the bottom

## 2023-01-18 NOTE — TELEPHONE ENCOUNTER
----- Message from Radha Lane sent at 1/18/2023  3:31 PM CST -----  Pt's daughter called asking if something can be called in for the pt, she is itching and burning at the bottom

## 2023-03-03 ENCOUNTER — TELEPHONE (OUTPATIENT)
Dept: INTERNAL MEDICINE | Facility: CLINIC | Age: 78
End: 2023-03-03
Payer: MEDICARE

## 2023-03-03 NOTE — TELEPHONE ENCOUNTER
----- Message from ERYN Chand sent at 3/3/2023 11:13 AM CST -----  Sleed aides are not recommended for dementia. Has she tried Melatonin? We could give trazodone a try if she hasn't done this yet if melatonin ineffective.  ----- Message -----  From: Jayden Carballo  Sent: 3/3/2023  10:01 AM CST  To: ERYN Chand    Daughter asking if there is anything else she can take to help mom sleep  ----- Message -----  From: ERYN Chand  Sent: 3/2/2023  12:59 PM CST  To: Jayden Carballo    She is taking Namenda  ----- Message -----  From: Jayden Carballo  Sent: 3/2/2023  12:48 PM CST  To: ERYN Chand    Daughter stated mom has been cutting up this week .. not sleeping, pt has a dr friend requesting to switch sleep med to Namenda     Please advise?

## 2023-03-03 NOTE — TELEPHONE ENCOUNTER
Pt daughter informed .. She will try the melatonin over the weekend. I advised patient daughter to call us Monday with an update

## 2023-03-20 ENCOUNTER — TELEPHONE (OUTPATIENT)
Dept: INTERNAL MEDICINE | Facility: CLINIC | Age: 78
End: 2023-03-20
Payer: MEDICARE

## 2023-03-20 DIAGNOSIS — E78.5 DYSLIPIDEMIA: Primary | ICD-10-CM

## 2023-03-20 RX ORDER — ATORVASTATIN CALCIUM 20 MG/1
20 TABLET, FILM COATED ORAL NIGHTLY
Qty: 90 TABLET | Refills: 3 | Status: SHIPPED | OUTPATIENT
Start: 2023-03-20 | End: 2024-01-08

## 2023-03-20 NOTE — TELEPHONE ENCOUNTER
----- Message from Latoya Rock sent at 3/20/2023  9:05 AM CDT -----  Regarding: Refill  Type:  RX Refill Request    Who Called: Grace  Refill or New Rx:Refill  RX Name and Strength:atorvastatin (LIPITOR) 20 MG tablet  How is the patient currently taking it? (ex. 1XDay):  Is this a 30 day or 90 day RX:  Preferred Pharmacy with phone number:[facility]  Local or Mail Order:local  Ordering Provider:  Would the patient rather a call back or a response via [facility]?   Best Call Back Number:  Additional Information:

## 2023-04-12 ENCOUNTER — TELEPHONE (OUTPATIENT)
Dept: INTERNAL MEDICINE | Facility: CLINIC | Age: 78
End: 2023-04-12
Payer: MEDICARE

## 2023-04-12 DIAGNOSIS — F32.89 OTHER DEPRESSION: Primary | ICD-10-CM

## 2023-04-12 RX ORDER — SERTRALINE HYDROCHLORIDE 100 MG/1
150 TABLET, FILM COATED ORAL DAILY
Qty: 45 TABLET | Refills: 2 | Status: ON HOLD | OUTPATIENT
Start: 2023-04-12 | End: 2023-05-19 | Stop reason: SDUPTHER

## 2023-04-12 NOTE — TELEPHONE ENCOUNTER
----- Message from Latoya Lee sent at 4/12/2023  8:19 AM CDT -----  Regarding: Refill  Type:  RX Refill Request    Who Called: Grace  Refill or New Rx:refill  RX Name and Strength:sertraline (ZOLOFT) 100 MG tablet  How is the patient currently taking it? (ex. 1XDay):  Is this a 30 day or 90 day RX:  Preferred Pharmacy with phone number:[facility] in [place]  Local or Mail Order:  Ordering Provider:  Would the patient rather a call back or a response via [facility]? Call back-If needed  Best Call Back Number:[phone]-  Additional Information:

## 2023-05-17 ENCOUNTER — HOSPITAL ENCOUNTER (OUTPATIENT)
Facility: HOSPITAL | Age: 78
Discharge: HOME | End: 2023-05-22
Attending: EMERGENCY MEDICINE | Admitting: INTERNAL MEDICINE
Payer: MEDICARE

## 2023-05-17 DIAGNOSIS — R41.89 COGNITIVE IMPAIRMENT: ICD-10-CM

## 2023-05-17 DIAGNOSIS — R41.0 DISORIENTATION: ICD-10-CM

## 2023-05-17 DIAGNOSIS — F03.90 DEMENTIA WITHOUT BEHAVIORAL DISTURBANCE: ICD-10-CM

## 2023-05-17 DIAGNOSIS — F02.C18 SEVERE LATE ONSET ALZHEIMER'S DEMENTIA WITH OTHER BEHAVIORAL DISTURBANCE: ICD-10-CM

## 2023-05-17 DIAGNOSIS — R41.82 AMS (ALTERED MENTAL STATUS): ICD-10-CM

## 2023-05-17 DIAGNOSIS — F32.89 OTHER DEPRESSION: ICD-10-CM

## 2023-05-17 DIAGNOSIS — G30.1 SEVERE LATE ONSET ALZHEIMER'S DEMENTIA WITH OTHER BEHAVIORAL DISTURBANCE: ICD-10-CM

## 2023-05-17 DIAGNOSIS — N30.90 CYSTITIS: Primary | ICD-10-CM

## 2023-05-17 LAB
ALBUMIN SERPL-MCNC: 4.4 G/DL (ref 3.4–4.8)
ALBUMIN/GLOB SERPL: 1.4 RATIO (ref 1.1–2)
ALP SERPL-CCNC: 60 UNIT/L (ref 40–150)
ALT SERPL-CCNC: 27 UNIT/L (ref 0–55)
APPEARANCE UR: CLEAR
APTT PPP: 28.3 SECONDS (ref 23.2–33.7)
AST SERPL-CCNC: 33 UNIT/L (ref 5–34)
BACTERIA #/AREA URNS AUTO: ABNORMAL /HPF
BASOPHILS # BLD AUTO: 0.03 X10(3)/MCL
BASOPHILS NFR BLD AUTO: 0.5 %
BILIRUB UR QL STRIP.AUTO: NEGATIVE MG/DL
BILIRUBIN DIRECT+TOT PNL SERPL-MCNC: 0.5 MG/DL
BUN SERPL-MCNC: 19.8 MG/DL (ref 9.8–20.1)
CALCIUM SERPL-MCNC: 10.2 MG/DL (ref 8.4–10.2)
CHLORIDE SERPL-SCNC: 109 MMOL/L (ref 98–107)
CO2 SERPL-SCNC: 24 MMOL/L (ref 23–31)
COLOR UR: YELLOW
CREAT SERPL-MCNC: 1.43 MG/DL (ref 0.55–1.02)
EOSINOPHIL # BLD AUTO: 0.36 X10(3)/MCL (ref 0–0.9)
EOSINOPHIL NFR BLD AUTO: 5.6 %
ERYTHROCYTE [DISTWIDTH] IN BLOOD BY AUTOMATED COUNT: 15.3 % (ref 11.5–17)
GFR SERPLBLD CREATININE-BSD FMLA CKD-EPI: 38 MLS/MIN/1.73/M2
GLOBULIN SER-MCNC: 3.2 GM/DL (ref 2.4–3.5)
GLUCOSE SERPL-MCNC: 96 MG/DL (ref 82–115)
GLUCOSE UR QL STRIP.AUTO: NEGATIVE MG/DL
HCT VFR BLD AUTO: 38.6 % (ref 37–47)
HGB BLD-MCNC: 12 G/DL (ref 12–16)
IMM GRANULOCYTES # BLD AUTO: 0.02 X10(3)/MCL (ref 0–0.04)
IMM GRANULOCYTES NFR BLD AUTO: 0.3 %
INR BLD: 1.07 (ref 0–1.3)
KETONES UR QL STRIP.AUTO: NEGATIVE MG/DL
LEUKOCYTE ESTERASE UR QL STRIP.AUTO: ABNORMAL UNIT/L
LYMPHOCYTES # BLD AUTO: 1.93 X10(3)/MCL (ref 0.6–4.6)
LYMPHOCYTES NFR BLD AUTO: 30.1 %
MCH RBC QN AUTO: 27 PG (ref 27–31)
MCHC RBC AUTO-ENTMCNC: 31.1 G/DL (ref 33–36)
MCV RBC AUTO: 86.7 FL (ref 80–94)
MONOCYTES # BLD AUTO: 0.5 X10(3)/MCL (ref 0.1–1.3)
MONOCYTES NFR BLD AUTO: 7.8 %
NEUTROPHILS # BLD AUTO: 3.58 X10(3)/MCL (ref 2.1–9.2)
NEUTROPHILS NFR BLD AUTO: 55.7 %
NITRITE UR QL STRIP.AUTO: NEGATIVE
NRBC BLD AUTO-RTO: 0 %
PH UR STRIP.AUTO: 5.5 [PH]
PLATELET # BLD AUTO: 155 X10(3)/MCL (ref 130–400)
PMV BLD AUTO: 10.8 FL (ref 7.4–10.4)
POTASSIUM SERPL-SCNC: 4.1 MMOL/L (ref 3.5–5.1)
PROT SERPL-MCNC: 7.6 GM/DL (ref 5.8–7.6)
PROT UR QL STRIP.AUTO: NEGATIVE MG/DL
PROTHROMBIN TIME: 13.8 SECONDS (ref 12.5–14.5)
RBC # BLD AUTO: 4.45 X10(6)/MCL (ref 4.2–5.4)
RBC #/AREA URNS AUTO: <5 /HPF
RBC UR QL AUTO: NEGATIVE UNIT/L
SODIUM SERPL-SCNC: 142 MMOL/L (ref 136–145)
SP GR UR STRIP.AUTO: 1.02 (ref 1–1.03)
SQUAMOUS #/AREA URNS AUTO: <5 /HPF
TROPONIN I SERPL-MCNC: <0.01 NG/ML (ref 0–0.04)
TSH SERPL-ACNC: 2.94 UIU/ML (ref 0.35–4.94)
UROBILINOGEN UR STRIP-ACNC: 1 MG/DL
WBC # SPEC AUTO: 6.42 X10(3)/MCL (ref 4.5–11.5)
WBC #/AREA URNS AUTO: 44 /HPF

## 2023-05-17 PROCEDURE — 84443 ASSAY THYROID STIM HORMONE: CPT | Performed by: EMERGENCY MEDICINE

## 2023-05-17 PROCEDURE — G0378 HOSPITAL OBSERVATION PER HR: HCPCS

## 2023-05-17 PROCEDURE — 87088 URINE BACTERIA CULTURE: CPT | Performed by: PHYSICIAN ASSISTANT

## 2023-05-17 PROCEDURE — 99285 EMERGENCY DEPT VISIT HI MDM: CPT | Mod: 25

## 2023-05-17 PROCEDURE — 85025 COMPLETE CBC W/AUTO DIFF WBC: CPT | Performed by: PHYSICIAN ASSISTANT

## 2023-05-17 PROCEDURE — 25000003 PHARM REV CODE 250: Performed by: EMERGENCY MEDICINE

## 2023-05-17 PROCEDURE — 80053 COMPREHEN METABOLIC PANEL: CPT | Performed by: PHYSICIAN ASSISTANT

## 2023-05-17 PROCEDURE — 11000001 HC ACUTE MED/SURG PRIVATE ROOM

## 2023-05-17 PROCEDURE — 96375 TX/PRO/DX INJ NEW DRUG ADDON: CPT

## 2023-05-17 PROCEDURE — 85610 PROTHROMBIN TIME: CPT | Performed by: PHYSICIAN ASSISTANT

## 2023-05-17 PROCEDURE — 81001 URINALYSIS AUTO W/SCOPE: CPT | Performed by: PHYSICIAN ASSISTANT

## 2023-05-17 PROCEDURE — 93005 ELECTROCARDIOGRAM TRACING: CPT

## 2023-05-17 PROCEDURE — 96365 THER/PROPH/DIAG IV INF INIT: CPT

## 2023-05-17 PROCEDURE — 63600175 PHARM REV CODE 636 W HCPCS: Performed by: EMERGENCY MEDICINE

## 2023-05-17 PROCEDURE — 84484 ASSAY OF TROPONIN QUANT: CPT | Performed by: PHYSICIAN ASSISTANT

## 2023-05-17 PROCEDURE — 85730 THROMBOPLASTIN TIME PARTIAL: CPT | Performed by: PHYSICIAN ASSISTANT

## 2023-05-17 RX ORDER — SODIUM CHLORIDE 9 MG/ML
1000 INJECTION, SOLUTION INTRAVENOUS
Status: ACTIVE | OUTPATIENT
Start: 2023-05-17 | End: 2023-05-18

## 2023-05-17 RX ORDER — ONDANSETRON 2 MG/ML
4 INJECTION INTRAMUSCULAR; INTRAVENOUS EVERY 8 HOURS PRN
Status: DISCONTINUED | OUTPATIENT
Start: 2023-05-17 | End: 2023-05-22 | Stop reason: HOSPADM

## 2023-05-17 RX ORDER — QUETIAPINE FUMARATE 25 MG/1
25 TABLET, FILM COATED ORAL NIGHTLY
Status: DISCONTINUED | OUTPATIENT
Start: 2023-05-18 | End: 2023-05-18

## 2023-05-17 RX ORDER — HYDRALAZINE HYDROCHLORIDE 20 MG/ML
10 INJECTION INTRAMUSCULAR; INTRAVENOUS
Status: COMPLETED | OUTPATIENT
Start: 2023-05-17 | End: 2023-05-17

## 2023-05-17 RX ORDER — ACETAMINOPHEN 325 MG/1
650 TABLET ORAL EVERY 8 HOURS PRN
Status: DISCONTINUED | OUTPATIENT
Start: 2023-05-17 | End: 2023-05-22 | Stop reason: HOSPADM

## 2023-05-17 RX ORDER — SODIUM CHLORIDE 0.9 % (FLUSH) 0.9 %
3 SYRINGE (ML) INJECTION EVERY 6 HOURS PRN
Status: DISCONTINUED | OUTPATIENT
Start: 2023-05-17 | End: 2023-05-22 | Stop reason: HOSPADM

## 2023-05-17 RX ORDER — TALC
6 POWDER (GRAM) TOPICAL NIGHTLY PRN
Status: DISCONTINUED | OUTPATIENT
Start: 2023-05-17 | End: 2023-05-22 | Stop reason: HOSPADM

## 2023-05-17 RX ORDER — QUETIAPINE FUMARATE 25 MG/1
25 TABLET, FILM COATED ORAL ONCE
Status: COMPLETED | OUTPATIENT
Start: 2023-05-18 | End: 2023-05-17

## 2023-05-17 RX ORDER — LORAZEPAM 2 MG/ML
1 INJECTION INTRAMUSCULAR
Status: COMPLETED | OUTPATIENT
Start: 2023-05-17 | End: 2023-05-17

## 2023-05-17 RX ADMIN — Medication 6 MG: at 10:05

## 2023-05-17 RX ADMIN — LORAZEPAM 1 MG: 2 INJECTION INTRAMUSCULAR; INTRAVENOUS at 09:05

## 2023-05-17 RX ADMIN — CEFTRIAXONE SODIUM 1 G: 1 INJECTION, POWDER, FOR SOLUTION INTRAMUSCULAR; INTRAVENOUS at 09:05

## 2023-05-17 RX ADMIN — QUETIAPINE FUMARATE 25 MG: 25 TABLET ORAL at 11:05

## 2023-05-17 RX ADMIN — HYDRALAZINE HYDROCHLORIDE 10 MG: 20 INJECTION INTRAMUSCULAR; INTRAVENOUS at 07:05

## 2023-05-17 NOTE — FIRST PROVIDER EVALUATION
Medical screening examination initiated.  I have conducted a focused provider triage encounter, findings are as follows:    Brief history of present illness:  69-year-old female presents to ED for evaluation of increased confusion since Sunday. Family denies any dementia    There were no vitals filed for this visit.    Pertinent physical exam:  Patient awake sitting in triage.  Patient confused    Brief workup plan:  labs, EKG, CT head, UA    Preliminary workup initiated; this workup will be continued and followed by the physician or advanced practice provider that is assigned to the patient when roomed.

## 2023-05-17 NOTE — Clinical Note
Diagnosis: AMS (altered mental status) [3545929]   Admitting Provider:: BRIANDA CORDOBA [[id number]]   Future Attending Provider: BRIANDA CORDOBA [[id number]]   Reason for IP Medical Treatment  (Clinical interventions that can only be accomplished in the IP setting? ) :: IV antibiotics   I certify that Inpatient services for greater than or equal to 2 midnights are medically necessary:: Yes   Plans for Post-Acute care--if anticipated (pick the single best option):: A. No post acute care anticipated at this time   Special Needs:: Fall Risk [15]

## 2023-05-18 PROBLEM — R07.81 RIB PAIN: Status: RESOLVED | Noted: 2022-08-19 | Resolved: 2023-05-18

## 2023-05-18 PROBLEM — M79.601 RIGHT ARM PAIN: Status: RESOLVED | Noted: 2022-08-19 | Resolved: 2023-05-18

## 2023-05-18 PROBLEM — E53.8 B12 DEFICIENCY: Status: RESOLVED | Noted: 2023-01-10 | Resolved: 2023-05-18

## 2023-05-18 PROBLEM — Z78.0 POSTMENOPAUSAL STATE: Status: RESOLVED | Noted: 2022-06-28 | Resolved: 2023-05-18

## 2023-05-18 PROBLEM — R41.82 AMS (ALTERED MENTAL STATUS): Status: ACTIVE | Noted: 2023-05-18

## 2023-05-18 PROBLEM — H61.20 IMPACTED CERUMEN: Status: RESOLVED | Noted: 2022-06-28 | Resolved: 2023-05-18

## 2023-05-18 PROBLEM — M54.50 LUMBAR BACK PAIN: Status: RESOLVED | Noted: 2022-09-19 | Resolved: 2023-05-18

## 2023-05-18 PROBLEM — M54.9 BACK PAIN: Status: RESOLVED | Noted: 2022-09-19 | Resolved: 2023-05-18

## 2023-05-18 PROBLEM — M25.519 SHOULDER PAIN: Status: RESOLVED | Noted: 2022-09-19 | Resolved: 2023-05-18

## 2023-05-18 PROBLEM — Z12.31 SCREENING MAMMOGRAM FOR BREAST CANCER: Status: RESOLVED | Noted: 2022-06-28 | Resolved: 2023-05-18

## 2023-05-18 PROBLEM — E53.8 FOLIC ACID DEFICIENCY: Status: RESOLVED | Noted: 2023-01-10 | Resolved: 2023-05-18

## 2023-05-18 LAB
AV INDEX (PROSTH): 0.54
AV MEAN GRADIENT: 3 MMHG
AV PEAK GRADIENT: 5 MMHG
AV REGURGITATION PRESSURE HALF TIME: 662 MS
AV VALVE AREA: 1.7 CM2
AV VELOCITY RATIO: 0.66
BSA FOR ECHO PROCEDURE: 1.64 M2
CREAT SERPL-MCNC: 1.24 MG/DL (ref 0.55–1.02)
CV ECHO LV RWT: 0.39 CM
DOP CALC AO PEAK VEL: 1.1 M/S
DOP CALC AO VTI: 24.7 CM
DOP CALC LVOT AREA: 3.1 CM2
DOP CALC LVOT DIAMETER: 2 CM
DOP CALC LVOT PEAK VEL: 0.73 M/S
DOP CALC LVOT STROKE VOLUME: 42.08 CM3
DOP CALC MV VTI: 18.2 CM
DOP CALCLVOT PEAK VEL VTI: 13.4 CM
E WAVE DECELERATION TIME: 269 MSEC
E/A RATIO: 0.7
E/E' RATIO: 6 M/S
ECHO LV POSTERIOR WALL: 0.91 CM (ref 0.6–1.1)
EJECTION FRACTION: 47 %
FRACTIONAL SHORTENING: 22 % (ref 28–44)
GFR SERPLBLD CREATININE-BSD FMLA CKD-EPI: 45 MLS/MIN/1.73/M2
INTERVENTRICULAR SEPTUM: 1.02 CM (ref 0.6–1.1)
LEFT ATRIUM SIZE: 3.4 CM
LEFT ATRIUM VOLUME INDEX MOD: 14.2 ML/M2
LEFT ATRIUM VOLUME MOD: 22.7 CM3
LEFT INTERNAL DIMENSION IN SYSTOLE: 3.58 CM (ref 2.1–4)
LEFT VENTRICLE DIASTOLIC VOLUME INDEX: 61.13 ML/M2
LEFT VENTRICLE DIASTOLIC VOLUME: 97.8 ML
LEFT VENTRICLE MASS INDEX: 95 G/M2
LEFT VENTRICLE SYSTOLIC VOLUME INDEX: 33.6 ML/M2
LEFT VENTRICLE SYSTOLIC VOLUME: 53.7 ML
LEFT VENTRICULAR INTERNAL DIMENSION IN DIASTOLE: 4.61 CM (ref 3.5–6)
LEFT VENTRICULAR MASS: 151.82 G
LV LATERAL E/E' RATIO: 4.64 M/S
LV SEPTAL E/E' RATIO: 8.5 M/S
LVOT MG: 1 MMHG
LVOT MV: 0.46 CM/S
MV MEAN GRADIENT: 1 MMHG
MV PEAK A VEL: 0.73 M/S
MV PEAK E VEL: 0.51 M/S
MV PEAK GRADIENT: 2 MMHG
MV STENOSIS PRESSURE HALF TIME: 30 MS
MV VALVE AREA BY CONTINUITY EQUATION: 2.31 CM2
MV VALVE AREA P 1/2 METHOD: 7.33 CM2
OHS LV EJECTION FRACTION SIMPSONS BIPLANE MOD: 5 %
PISA AR MAX VEL: 4.11 M/S
PISA TR MAX VEL: 2.32 M/S
PV PEAK VELOCITY: 0.8 CM/S
TDI LATERAL: 0.11 M/S
TDI SEPTAL: 0.06 M/S
TDI: 0.09 M/S
TR MAX PG: 22 MMHG
TRICUSPID ANNULAR PLANE SYSTOLIC EXCURSION: 1.8 CM

## 2023-05-18 PROCEDURE — 99223 PR INITIAL HOSPITAL CARE,LEVL III: ICD-10-PCS | Mod: AI,,, | Performed by: INTERNAL MEDICINE

## 2023-05-18 PROCEDURE — 97166 OT EVAL MOD COMPLEX 45 MIN: CPT

## 2023-05-18 PROCEDURE — 99223 1ST HOSP IP/OBS HIGH 75: CPT | Mod: AI,,, | Performed by: INTERNAL MEDICINE

## 2023-05-18 PROCEDURE — 25000003 PHARM REV CODE 250: Performed by: INTERNAL MEDICINE

## 2023-05-18 PROCEDURE — 11000001 HC ACUTE MED/SURG PRIVATE ROOM

## 2023-05-18 PROCEDURE — 82565 ASSAY OF CREATININE: CPT | Performed by: INTERNAL MEDICINE

## 2023-05-18 PROCEDURE — G0378 HOSPITAL OBSERVATION PER HR: HCPCS

## 2023-05-18 PROCEDURE — 63600175 PHARM REV CODE 636 W HCPCS: Performed by: EMERGENCY MEDICINE

## 2023-05-18 PROCEDURE — 25000003 PHARM REV CODE 250: Performed by: EMERGENCY MEDICINE

## 2023-05-18 RX ORDER — DONEPEZIL HYDROCHLORIDE 5 MG/1
10 TABLET, FILM COATED ORAL NIGHTLY
Status: DISCONTINUED | OUTPATIENT
Start: 2023-05-18 | End: 2023-05-22 | Stop reason: HOSPADM

## 2023-05-18 RX ORDER — ATORVASTATIN CALCIUM 10 MG/1
20 TABLET, FILM COATED ORAL NIGHTLY
Status: DISCONTINUED | OUTPATIENT
Start: 2023-05-18 | End: 2023-05-22 | Stop reason: HOSPADM

## 2023-05-18 RX ORDER — NAPROXEN SODIUM 220 MG/1
81 TABLET, FILM COATED ORAL DAILY
Status: DISCONTINUED | OUTPATIENT
Start: 2023-05-18 | End: 2023-05-22 | Stop reason: HOSPADM

## 2023-05-18 RX ORDER — PANTOPRAZOLE SODIUM 40 MG/1
40 TABLET, DELAYED RELEASE ORAL DAILY
Status: DISCONTINUED | OUTPATIENT
Start: 2023-05-18 | End: 2023-05-22 | Stop reason: HOSPADM

## 2023-05-18 RX ORDER — SERTRALINE HYDROCHLORIDE 50 MG/1
150 TABLET, FILM COATED ORAL DAILY
Status: DISCONTINUED | OUTPATIENT
Start: 2023-05-18 | End: 2023-05-19

## 2023-05-18 RX ORDER — QUETIAPINE FUMARATE 25 MG/1
25 TABLET, FILM COATED ORAL DAILY
Status: DISCONTINUED | OUTPATIENT
Start: 2023-05-18 | End: 2023-05-19

## 2023-05-18 RX ORDER — CETIRIZINE HYDROCHLORIDE 10 MG/1
10 TABLET ORAL DAILY
Status: DISCONTINUED | OUTPATIENT
Start: 2023-05-18 | End: 2023-05-22 | Stop reason: HOSPADM

## 2023-05-18 RX ORDER — MEMANTINE HYDROCHLORIDE 5 MG/1
5 TABLET ORAL 2 TIMES DAILY
Status: DISCONTINUED | OUTPATIENT
Start: 2023-05-18 | End: 2023-05-22 | Stop reason: HOSPADM

## 2023-05-18 RX ADMIN — PANTOPRAZOLE SODIUM 40 MG: 40 TABLET, DELAYED RELEASE ORAL at 11:05

## 2023-05-18 RX ADMIN — MEMANTINE 5 MG: 5 TABLET ORAL at 11:05

## 2023-05-18 RX ADMIN — Medication 6 MG: at 08:05

## 2023-05-18 RX ADMIN — CETIRIZINE HYDROCHLORIDE 10 MG: 10 TABLET, FILM COATED ORAL at 11:05

## 2023-05-18 RX ADMIN — CEFTRIAXONE SODIUM 1 G: 1 INJECTION, POWDER, FOR SOLUTION INTRAMUSCULAR; INTRAVENOUS at 06:05

## 2023-05-18 RX ADMIN — QUETIAPINE FUMARATE 25 MG: 25 TABLET ORAL at 04:05

## 2023-05-18 RX ADMIN — SERTRALINE 150 MG: 50 TABLET, FILM COATED ORAL at 11:05

## 2023-05-18 RX ADMIN — MEMANTINE 5 MG: 5 TABLET ORAL at 08:05

## 2023-05-18 RX ADMIN — DONEPEZIL HYDROCHLORIDE 10 MG: 5 TABLET, FILM COATED ORAL at 08:05

## 2023-05-18 RX ADMIN — METOPROLOL SUCCINATE 12.5 MG: 25 TABLET, EXTENDED RELEASE ORAL at 11:05

## 2023-05-18 RX ADMIN — ATORVASTATIN CALCIUM 20 MG: 10 TABLET, FILM COATED ORAL at 08:05

## 2023-05-18 RX ADMIN — ASPIRIN 81 MG 81 MG: 81 TABLET ORAL at 11:05

## 2023-05-18 NOTE — PLAN OF CARE
05/18/23 0919   Discharge Assessment   Assessment Type Discharge Planning Assessment   Source of Information other (see comments)  (pt neighbor Grace Randolph is sitting with patient)   Reason For Admission AMS, cystitis   People in Home child(surendra), adult   Do you expect to return to your current living situation? Yes   Do you have help at home or someone to help you manage your care at home? Yes   Who are your caregiver(s) and their phone number(s)? Pt lives with daughter Barbie [phone]   Current cognitive status: Unable to Assess   Do you currently have service(s) that help you manage your care at home? No   Who is going to help you get home at discharge? Barbie   How do you get to doctors appointments? family or friend will provide   Are you on dialysis? No   Do you take coumadin? No   Discharge Plan A Home with family  (plans to attend [facility] 5 days a week 7-5)   Discharge Plan B Home with family   Discharge Plan discussed with: Friend   Name(s) and Number(s) Grace Randolph     Pt is accompanied by her neighbor Grace Randolph. She states patient lives with her daughter Barbie [phone]. Per Grace Randolph pt is independent with ADL's prior to admission. She has a history of dementia. Pt only opened eyes when assessment done. Per Grace Randolph pt does not need DME. Prior to decline in mental status, pt is scheduled to go to [facility] [place] on Monday thru Friday. Facility provides transportation from 7a to 5pm. Pt had sitters but no longer since acceptance to day care facility. Will follow for discharge needs.

## 2023-05-18 NOTE — NURSING
Patient transferred to unit at this time, awake/alert with confusion, no distress noted  vitals WNL daughter at bedside

## 2023-05-18 NOTE — PT/OT/SLP EVAL
"Occupational Therapy  Evaluation    Name: Grace Allen  MRN: [id number]  ED due to : AMS  Admitting Diagnosis: AMS, cystitis   Med Hx: CAD, HTN, Alzheimer's/dementia  Recent Surgery: * No surgery found *      Recommendations:     Discharge Recommendations: home with home health  Discharge Equipment Recommendations:     Barriers to discharge:       Assessment:     Grace Allen is a 77 y.o. female with a medical diagnosis of AMS, cystitis .  She presents with cognitive deficits/confusion, decreased balance impeding I with ADL tasks and mobility. Performance deficits affecting function: impaired endurance, impaired balance, impaired self care skills, gait instability, impaired functional mobility, impaired cognition.      Rehab Prognosis: Fair; patient would benefit from acute skilled OT services to address these deficits and reach maximum level of function.       Plan:     Patient to be seen 5 x/week to address the above listed problems via self-care/home management, therapeutic activities, therapeutic exercises, cognitive retraining  Plan of Care Expires:    Plan of Care Reviewed with: patient, friend    Subjective     Chief Complaint: no c/o  Patient/Family Comments/goals: none stated    Occupational Profile:  Living Environment: pt resides at home with daughter, no steps, tub/shower combo  Previous level of function: PLOF pt was I with ADL tasks and functional mobility without AD; upon Dc pt will go to [facility] center Monday-Friday  Roles and Routines:   Equipment Used at Home:    Assistance upon Discharge: unknown    Pain/Comfort:  Pain Rating 1:  ("neck pain")    Patients cultural, spiritual, Episcopal conflicts given the current situation:      Objective:      Patient found supine with peripheral IV upon OT entry to room with neighbor friend bedside.    General Precautions: Standard,    Orthopedic Precautions:    Braces:    Respiratory Status: Room air    Occupational Performance:    Bed Mobility:  "   Patient completed Rolling/Turning to Right with supervision  Patient completed Scooting/Bridging with stand by assistance  Patient completed Supine to Sit with stand by assistance    Functional Mobility/Transfers:  Patient completed Sit <> Stand Transfer with contact guard assistance  with  rolling walker   Patient completed Toilet Transfer Step Transfer technique with minimum assistance with  rolling walker and grab bars  Functional Mobility: pt able to ambulate EOB>bathroom toilet with RW min A daisha 12ft; ambulated back to EOB with RW x 15ft min A for proper usage of RW    Activities of Daily Living:  Feeding:  stand by assistance .  Grooming: contact guard assistance in standing at sink, hand hygiene  Lower Body Dressing: pt able to clive B socks sitting EOB with figure 4 technique min A ; able to clive shoes mod A, assist for tying shoelaces  Toileting: min A clothing management ; +void  able to complete pericleaning SBA    Cognitive/Visual Perceptual:  Cognitive/Psychosocial Skills:     -       Oriented to: Person and disoriented to time/place/situation   -       Follows Commands/attention:Follows one-step commands  -       Safety awareness/insight to disability: impaired   -       Mood/Affect/Coping skills/emotional control: Cooperative and Pleasant    Physical Exam:  Balance: -       standing balance min A  Upper Extremity Strength:    -       Right Upper Extremity: WNL  -       Left Upper Extremity: WNL    Therapeutic Positioning  Risk for acquired pressure injuries is decreased due to ability to get to BSC/toilet with assist and continence.    OT interventions performed during the course of today's session in an effort to prevent and/or reduce acquired pressure injuries:   Minimal risks    Skin assessment: not assessed pt able to mobilize     OT recommendations for therapeutic positioning throughout hospitalization:   Follow [facility] Pressure Injury Prevention Protocol      [facility] 6 Click ADL:  [facility] Total  Score:      Additional Treatment:       Patient Education:  Patient and friend/neighbor  provided with verbal education regarding OT role/goals/POC, safety awareness, and Discharge/DME recommendations.  Understanding was verbalized.     Patient left sitting edge of bed with all lines intact, call button in reach, and family/neighbor present    GOALS:   Multidisciplinary Problems       Occupational Therapy Goals          Problem: Occupational Therapy    Goal Priority Disciplines Outcome Interventions   Occupational Therapy Goal     OT, PT/OT Ongoing, Progressing    Description: Pt will ambulate to bathroom with least restrictive AD SBA  Pt will complete LE dressing with SBA  Pt will complete grooming in standing at sink SBA  Pt will complete toileting tasks SBA  Pt will complete toilet t/f SBA                       History:     Past Medical History:   Diagnosis Date    Alzheimer's dementia 8/19/2022    CAD (coronary artery disease)     Dyslipidemia     Hypertension          Past Surgical History:   Procedure Laterality Date    CHOLECYSTECTOMY         Time Tracking:     OT Date of Treatment:    OT Start Time: 1050  OT Stop Time: 1119  OT Total Time (min): 29 min    Billable Minutes:Evaluation mod comp    5/18/2023

## 2023-05-18 NOTE — NURSING
Nurses Note -- 4 Eyes      5/18/2023   1:16 AM      Skin assessed during: Admit      [x] No Altered Skin Integrity Present    []Prevention Measures Documented      [] Yes- Altered Skin Integrity Present or Discovered   [] LDA Added if Not in Epic (Describe Wound)   [] New Altered Skin Integrity was Present on Admit and Documented in LDA   [] Wound Image Taken    Wound Care Consulted? No    Attending Nurse:  Bill Antunez RN     Second RN/Staff Member:  Jazmin Shankar RN

## 2023-05-18 NOTE — PLAN OF CARE
Problem: Occupational Therapy  Goal: Occupational Therapy Goal  Description: Pt will ambulate to bathroom with least restrictive AD SBA  Pt will complete LE dressing with SBA  Pt will complete grooming in standing at sink SBA  Pt will complete toileting tasks SBA  Pt will complete toilet t/f SBA  Outcome: Ongoing, Progressing

## 2023-05-18 NOTE — H&P
[facility] Medicine History & Physical Examination       Patient: Grace Allen  MRN: [id number]  STATUS: IP- Inpatient   DOS: 5/18/2023   PCP: Dewey Cazares MD      CC: AMS       HISTORY OF PRESENT ILLNESS   77-year-old -American female here admitted for cognitive impairment difficulty with word finding that started on 05/17/2023 she has a history of Alzheimer's dementia.  She is followed by Leobardo Parsons for her dementia.  She is a very pleasant patient and she lives with her daughter her neighbor Grace is here at the bedside today and states that symptoms started yesterday she noted at a home owners association meeting that Ms. Edwards was more confused than her usual and had trouble getting her words out.  She is followed by Dr. Padilla for Cards    She is lethargic this morning.  She is arousable and does answer questions but you can tell that she has trouble with finding the words.  Initial workup in the ED is negative, CT of the head is negative.    Patient does follow directions but has to be prompted several several times.        REVIEW OF SYSTEMS   Except as documented, all other systems reviewed and negative       PAST MEDICAL HISTORY     Past Medical History:   Diagnosis Date    Alzheimer's dementia 8/19/2022    CAD (coronary artery disease)     Dyslipidemia     Hypertension           PAST SURGICAL HISTORY     Past Surgical History:   Procedure Laterality Date    CHOLECYSTECTOMY            FAMILY HISTORY   Reviewed, negative in relation to patient's current condition.  Family History   Problem Relation Age of Onset    Diabetes Mother     Lung cancer Father           SOCIAL HISTORY     Social History     Tobacco Use    Smoking status: Never    Smokeless tobacco: Never   Substance Use Topics    Alcohol use: Not Currently          ALLERGIES   Adhesive tape-silicones        HOME MEDICATIONS     Current Outpatient Medications   Medication Instructions     aspirin 81 mg, Oral, Daily    atorvastatin (LIPITOR) 20 mg, Oral, Nightly    cetirizine (ZYRTEC) 10 mg, Oral, Daily    clotrimazole-betamethasone 1-0.05% (LOTRISONE) cream APPLY TOPICALLY TWICE A DAY    donepeziL (ARICEPT) 10 mg, Oral, Nightly    fluticasone propionate (FLONASE) 50 mcg, Each Nostril, Daily    memantine (NAMENDA) 5 mg, Oral, 2 times daily    methocarbamoL (ROBAXIN) 500 mg, Oral, 4 times daily PRN    metoprolol succinate (TOPROL-XL) 12.5 mg, Oral, Daily    pantoprazole (PROTONIX) 40 mg, Oral, Daily    QUEtiapine (SEROQUEL) 25 mg, Oral, Nightly, 1/2 tablet at bedtime    sertraline (ZOLOFT) 150 mg, Oral, Daily, Taking 1 1/2 tab.    vitamin D (VITAMIN D3) 5,000 Units, Oral          PHYSICAL EXAM   VITALS:  /83   Pulse 65   Temp 97.9 °F (36.6 °C) (Oral)   Resp 16   Ht 5' (1.524 m)   Wt 63.5 kg (140 lb)   SpO2 100%   BMI 27.34 kg/m²      GENERAL: Awake and in NAD  HEENT: NC/AT, EOMI, PERRL.  NECK: Supple,  No JVD  LUNGS: CTA B/L  CVS: RRR, S1S2 positive  GI/: Soft, NT/ND, bowel sounds positive.  EXTREMITIES: Peripheral pulses 2+, no peripheral edema  DERM: Warm, dry.  No rashes or lesions noted.  NEURO: AAOx0, no focal neurologic deficit, trouble with word finding/ expression  PSYCH: Cooperative      LABS     Admission on 05/17/2023   Component Date Value    Sodium Level 05/17/2023 142     Potassium Level 05/17/2023 4.1     Chloride 05/17/2023 109 (H)     Carbon Dioxide 05/17/2023 24     Glucose Level 05/17/2023 96     Blood Urea Nitrogen 05/17/2023 19.8     Creatinine 05/17/2023 1.43 (H)     Calcium Level Total 05/17/2023 10.2     Protein Total 05/17/2023 7.6     Albumin Level 05/17/2023 4.4     Globulin 05/17/2023 3.2     Albumin/Globulin Ratio 05/17/2023 1.4     Bilirubin Total 05/17/2023 0.5     Alkaline Phosphatase 05/17/2023 60     Alanine Aminotransferase 05/17/2023 27     Aspartate Aminotransfera* 05/17/2023 33     eGFR 05/17/2023 38     Troponin-I 05/17/2023 <0.010     Color, UA  05/17/2023 Yellow     Appearance, UA 05/17/2023 Clear     Specific Gravity, UA 05/17/2023 1.018     pH, UA 05/17/2023 5.5     Protein, UA 05/17/2023 Negative     Glucose, UA 05/17/2023 Negative     Ketones, UA 05/17/2023 Negative     Blood, UA 05/17/2023 Negative     Bilirubin, UA 05/17/2023 Negative     Urobilinogen, UA 05/17/2023 1.0     Nitrites, UA 05/17/2023 Negative     Leukocyte Esterase, UA 05/17/2023 3+ (A)     PTT 05/17/2023 28.3     PT 05/17/2023 13.8     INR 05/17/2023 1.07     WBC 05/17/2023 6.42     RBC 05/17/2023 4.45     Hgb 05/17/2023 12.0     Hct 05/17/2023 38.6     MCV 05/17/2023 86.7     MCH 05/17/2023 27.0     MCHC 05/17/2023 31.1 (L)     RDW 05/17/2023 15.3     Platelet 05/17/2023 155     MPV 05/17/2023 10.8 (H)     Neut % 05/17/2023 55.7     Lymph % 05/17/2023 30.1     Mono % 05/17/2023 7.8     Eos % 05/17/2023 5.6     Basophil % 05/17/2023 0.5     Lymph # 05/17/2023 1.93     Neut # 05/17/2023 3.58     Mono # 05/17/2023 0.50     Eos # 05/17/2023 0.36     Baso # 05/17/2023 0.03     IG# 05/17/2023 0.02     IG% 05/17/2023 0.3     NRBC% 05/17/2023 0.0     RBC, UA 05/17/2023 <5     WBC, UA 05/17/2023 44 (H)     Squamous Epithelial Cell* 05/17/2023 <5     Bacteria, UA 05/17/2023 None Seen     Urine Culture 05/17/2023 No Growth At 24 Hours     Thyroid Stimulating Horm* 05/17/2023 2.944           DIAGNOSTICS   CT Head Without Contrast    Result Date: 5/17/2023  EXAMINATION: CT HEAD WITHOUT CONTRAST CLINICAL HISTORY: Mental status change, unknown cause; TECHNIQUE: Sequential axial images were performed of the brain without contrast. Dose product length of 960 mGycm. Automated exposure control was utilized to minimize radiation dose. COMPARISON: None available. FINDINGS: There is no intracranial mass effect, midline shift, hydrocephalus or hemorrhage. There is no sulcal effacement or low attenuation changes to suggest recent large vessel territory infarction. Chronic appearing periventricular and  subcortical white matter low attenuation changes are present and are consistent with chronic microangiopathic ischemia. The ventricular system and sulcal markings prominence is consistent with atrophy. There is no acute extra axial fluid collection. Visualized paranasal sinuses are clear without mucosal thickening, polypoidal abnormality or air-fluid levels. Mastoid air cells aeration is optimal.     1.  No acute intracranial findings identified. 2.  Chronic microangiopathic ischemia and atrophy. Electronically signed by: Geronimo Jones Date:    05/17/2023 Time:    19:25      CT Head Without Contrast   Final Result      1.  No acute intracranial findings identified.      2.  Chronic microangiopathic ischemia and atrophy.         Electronically signed by: Geronimo Jones   Date:    05/17/2023   Time:    19:25      MRI Brain With Contrast    (Results Pending)   MRA Brain without contrast    (Results Pending)         ASSESSMENT     Patient Active Problem List   Diagnosis    Arteriosclerosis of coronary artery    Dyslipidemia    Hypertension    Alzheimer's dementia    Falls frequently    AMS (altered mental status)          PLAN:     Hard to tell at this juncture if she is just having Alzheimer's disease progression or if she had a central event.  Initial workup in the ED including laboratory studies and CT of the head are all negative.  Will go ahead and go for MRI/ MRA today, carotid ultrasound and echo.  Up with PT and OT, ST eval.      Further recommendations to follow    We will continue to monitor for any encephalopathic changes, hemodynamic parameters, oxygenation, supplement oxygen as needed, fecal/ urinary output, electrolytes, H/H, and transfuse as needed. Images and images' reports have been reviewed; labs have been reviewed.      SCDs, up out of bed  Protonix for GI      Dewey Cazares MD  [facility]  05/18/2023         If the patient has been admitted under observation status, it is at my  discretion and under our care with hospital medicine services. [TWA]

## 2023-05-18 NOTE — ED PROVIDER NOTES
"Encounter Date: 5/17/2023    SCRIBE #1 NOTE: I, Anastasia Benjamin, am scribing for, and in the presence of,  Neal Pandey MD. I have scribed the following portions of the note - Other sections scribed: HPI, ROS, PE, MDM.     History     Chief Complaint   Patient presents with    Altered Mental Status     Pt's daughter reports AMS x4 days. Pt usually GCS 15, currently GCS 14. Denies dysuria & abdominal pain.     77 Y.O. female with a history of CAD, HTN, and dementia presents to the ED for worsening AMS onset 4 days ago. Denies headaches or pain. Not oriented to place. Pt's PCP is Dewey Cazares MD.    PER DAUGHTER AT BEDSIDE: Pt has been confused and has had unsteady gait since 5/14/23. States that Pt did not want to take any of her meds on Sunday and then spit out her meds on Monday, stating that she "does not want to do anything anymore". Reports constipation. Denies fever, N/V, and diarrhea.     The history is provided by the patient and a relative. No  was used.   Neurologic Problem  The primary symptoms include altered mental status. Primary symptoms do not include headaches, dizziness, fever, nausea or vomiting. The symptoms began several days ago. The symptoms are worsening.   The change in mental status includes confusion and incoordination.   Review of patient's allergies indicates:   Allergen Reactions    Adhesive tape-silicones      Past Medical History:   Diagnosis Date    Alzheimer's dementia 8/19/2022    CAD (coronary artery disease)     Dyslipidemia     Hypertension      Past Surgical History:   Procedure Laterality Date    CHOLECYSTECTOMY       Family History   Problem Relation Age of Onset    Diabetes Mother     Lung cancer Father      Social History     Tobacco Use    Smoking status: Never    Smokeless tobacco: Never   Substance Use Topics    Alcohol use: Not Currently    Drug use: Never     Review of Systems   Constitutional:  Negative for fatigue, fever and " unexpected weight change.   HENT:  Negative for congestion and rhinorrhea.    Eyes:  Negative for pain.   Respiratory:  Negative for chest tightness, shortness of breath and wheezing.    Cardiovascular:  Negative for chest pain.   Gastrointestinal:  Positive for constipation. Negative for abdominal pain, diarrhea, nausea and vomiting.   Genitourinary:  Negative for dysuria.   Musculoskeletal:  Positive for gait problem. Negative for back pain and neck pain.   Skin:  Negative for rash.   Allergic/Immunologic: Negative for environmental allergies, food allergies and immunocompromised state.   Neurological:  Negative for dizziness, speech difficulty and headaches.   Hematological:  Does not bruise/bleed easily.   Psychiatric/Behavioral:  Positive for confusion. Negative for sleep disturbance and suicidal ideas.      Physical Exam     Initial Vitals [05/17/23 1832]   BP Pulse Resp Temp SpO2   137/84 66 18 97.7 °F (36.5 °C) 98 %      MAP       --         Physical Exam    Constitutional: She appears well-developed and well-nourished. No distress.   Follows commands with prompting.   HENT:   Head: Normocephalic and atraumatic.   Mouth/Throat: Mucous membranes are normal.   Eyes: EOM are normal. Right eye exhibits no discharge. Left eye exhibits no discharge. No scleral icterus.   Neck: Neck supple. No tracheal deviation present.   Cardiovascular:  Normal rate, regular rhythm and intact distal pulses.           No murmur heard.  Pulmonary/Chest: Breath sounds normal. No stridor. No respiratory distress. She has no wheezes. She has no rhonchi.   Abdominal: She exhibits no distension. There is no abdominal tenderness. There is no rebound and no guarding.   Musculoskeletal:         General: No tenderness or edema. Normal range of motion.      Cervical back: Neck supple.     Neurological: She is alert. She has normal strength.   Confused and is slow to respond appropriately. No lethargy. 5/5 motor strength in upper and lower  extremities bilaterally.   Skin: Skin is dry. No rash noted. No erythema. No pallor.   Psychiatric: She has a normal mood and affect. Her behavior is normal. Judgment and thought content normal.       ED Course   Procedures  Labs Reviewed   COMPREHENSIVE METABOLIC PANEL - Abnormal; Notable for the following components:       Result Value    Chloride 109 (*)     Creatinine 1.43 (*)     All other components within normal limits   URINALYSIS, REFLEX TO URINE CULTURE - Abnormal; Notable for the following components:    Leukocyte Esterase, UA 3+ (*)     All other components within normal limits   CBC WITH DIFFERENTIAL - Abnormal; Notable for the following components:    MCHC 31.1 (*)     MPV 10.8 (*)     All other components within normal limits   URINALYSIS, MICROSCOPIC - Abnormal; Notable for the following components:    WBC, UA 44 (*)     All other components within normal limits   TROPONIN I - Normal   APTT - Normal   PROTIME-INR - Normal   CULTURE, URINE   CBC W/ AUTO DIFFERENTIAL    Narrative:     The following orders were created for panel order CBC auto differential.  Procedure                               Abnormality         Status                     ---------                               -----------         ------                     CBC with Differential[[id number]]        Abnormal            Final result                 Please view results for these tests on the individual orders.   TSH     EKG Readings: (Independently Interpreted)   Initial Reading: No STEMI. Rhythm: Normal Sinus Rhythm. Heart Rate: 62. Ectopy: No Ectopy. Conduction: LBBB. T Waves: Normal. Axis: Left Axis Deviation.   ECG Results              EKG 12-lead (Final result)  Result time 05/17/23 21:23:01      Final result by Interface, Lab In [facility] (05/17/23 21:23:01)                   Narrative:    Test Reason : R41.82,    Vent. Rate : 062 BPM     Atrial Rate : 062 BPM     P-R Int : 170 ms          QRS Dur : 114 ms      QT Int : 436 ms        P-R-T Axes : 049 -34 142 degrees     QTc Int : 442 ms    Normal sinus rhythm  Left axis deviation  Incomplete left bundle branch block  LVH with repolarization abnormality ( R in aVL , Hans product )  Abnormal ECG  No previous ECGs available  Confirmed by Armando Garza MD ([id number]) on 5/17/2023 9:22:51 PM    Referred By:             Confirmed By:Armando Garza MD                                  Imaging Results              CT Head Without Contrast (Final result)  Result time 05/17/23 19:25:22      Final result by Geronimo Jones MD (05/17/23 19:25:22)                   Impression:      1.  No acute intracranial findings identified.    2.  Chronic microangiopathic ischemia and atrophy.      Electronically signed by: Geronimo Jones  Date:    05/17/2023  Time:    19:25               Narrative:    EXAMINATION:  CT HEAD WITHOUT CONTRAST    CLINICAL HISTORY:  Mental status change, unknown cause;    TECHNIQUE:  Sequential axial images were performed of the brain without contrast.    Dose product length of 960 mGycm. Automated exposure control was utilized to minimize radiation dose.    COMPARISON:  None available.    FINDINGS:  There is no intracranial mass effect, midline shift, hydrocephalus or hemorrhage. There is no sulcal effacement or low attenuation changes to suggest recent large vessel territory infarction. Chronic appearing periventricular and subcortical white matter low attenuation changes are present and are consistent with chronic microangiopathic ischemia. The ventricular system and sulcal markings prominence is consistent with atrophy. There is no acute extra axial fluid collection. Visualized paranasal sinuses are clear without mucosal thickening, polypoidal abnormality or air-fluid levels. Mastoid air cells aeration is optimal.                                       Medications   sodium chloride 0.9% flush 3 mL (has no administration in time range)   acetaminophen tablet 650 mg (has no administration in  time range)   ondansetron injection 4 mg (has no administration in time range)   melatonin tablet 6 mg (has no administration in time range)   cefTRIAXone (ROCEPHIN) 1 g in dextrose 5 % in water (D5W) 5 % 50 mL IVPB (MB+) (has no administration in time range)   0.9%  NaCl infusion (has no administration in time range)   hydrALAZINE injection 10 mg (10 mg Intravenous Given 5/17/23 1945)   cefTRIAXone (ROCEPHIN) 1 g in dextrose 5 % in water (D5W) 5 % 50 mL IVPB (MB+) (1 g Intravenous New Bag 5/17/23 2100)   LORazepam injection 1 mg (1 mg Intravenous Given 5/17/23 2111)     Medical Decision Making:   Initial Assessment:   As per HPI  Differential Diagnosis:   Dehydration, pneumonia, UTI, electrolyte abnormality, dementia  Clinical Tests:   Lab Tests: Ordered and Reviewed       <> Summary of Lab: Urine positive for pyuria with no bacteria seen  Radiological Study: Ordered and Reviewed  Medical Tests: Reviewed and Ordered  ED Management:  CT of the head shows no acute changes.  Rocephin was given to cover for cystitis.  Will admit.  Vitals remain stable.        Scribe Attestation:   Scribe #1: I performed the above scribed service and the documentation accurately describes the services I performed. I attest to the accuracy of the note.    Attending Attestation:           Physician Attestation for Scribe:  Physician Attestation Statement for Scribe #1: I, Neal Pandey MD, reviewed documentation, as scribed by Anastasia Benjamin in my presence, and it is both accurate and complete.           ED Course as of 05/17/23 2141   Wed May 17, 2023   2055 Dewey Fernandez MD paged. [AS]   2129 EMBER Woodard to admit [KG]   2130 Patient remains in no apparent distress.  She still seems somewhat confused.  Vital signs are stable [KG]      ED Course User Index  [AS] Aylin Benjamin  [KG] Neal Pandey MD                   Clinical Impression:   Final diagnoses:  [R41.82] AMS (altered mental status)  [N30.90] Cystitis (Primary)         ED Disposition Condition    Admit Stable                Neal Pandey MD  05/17/23 2136       Neal Pandey MD  05/17/23 2136       Neal Pandey MD  05/17/23 [id number]

## 2023-05-19 PROBLEM — R41.89 COGNITIVE IMPAIRMENT: Status: ACTIVE | Noted: 2023-05-19

## 2023-05-19 PROBLEM — F03.90 DEMENTIA WITHOUT BEHAVIORAL DISTURBANCE: Status: ACTIVE | Noted: 2022-08-19

## 2023-05-19 LAB
ALBUMIN SERPL-MCNC: 3.6 G/DL (ref 3.4–4.8)
ALBUMIN/GLOB SERPL: 1.4 RATIO (ref 1.1–2)
ALP SERPL-CCNC: 54 UNIT/L (ref 40–150)
ALT SERPL-CCNC: 20 UNIT/L (ref 0–55)
AST SERPL-CCNC: 23 UNIT/L (ref 5–34)
BACTERIA UR CULT: NO GROWTH
BASOPHILS # BLD AUTO: 0.01 X10(3)/MCL
BASOPHILS NFR BLD AUTO: 0.2 %
BILIRUBIN DIRECT+TOT PNL SERPL-MCNC: 0.4 MG/DL
BUN SERPL-MCNC: 16.4 MG/DL (ref 9.8–20.1)
CALCIUM SERPL-MCNC: 9.6 MG/DL (ref 8.4–10.2)
CHLORIDE SERPL-SCNC: 109 MMOL/L (ref 98–107)
CO2 SERPL-SCNC: 23 MMOL/L (ref 23–31)
CREAT SERPL-MCNC: 1.41 MG/DL (ref 0.55–1.02)
EOSINOPHIL # BLD AUTO: 0.34 X10(3)/MCL (ref 0–0.9)
EOSINOPHIL NFR BLD AUTO: 8.1 %
ERYTHROCYTE [DISTWIDTH] IN BLOOD BY AUTOMATED COUNT: 15.2 % (ref 11.5–17)
GFR SERPLBLD CREATININE-BSD FMLA CKD-EPI: 38 MLS/MIN/1.73/M2
GLOBULIN SER-MCNC: 2.6 GM/DL (ref 2.4–3.5)
GLUCOSE SERPL-MCNC: 103 MG/DL (ref 82–115)
HCT VFR BLD AUTO: 34.5 % (ref 37–47)
HGB BLD-MCNC: 11.1 G/DL (ref 12–16)
IMM GRANULOCYTES # BLD AUTO: 0.01 X10(3)/MCL (ref 0–0.04)
IMM GRANULOCYTES NFR BLD AUTO: 0.2 %
LYMPHOCYTES # BLD AUTO: 1.01 X10(3)/MCL (ref 0.6–4.6)
LYMPHOCYTES NFR BLD AUTO: 24 %
MCH RBC QN AUTO: 27 PG (ref 27–31)
MCHC RBC AUTO-ENTMCNC: 32.2 G/DL (ref 33–36)
MCV RBC AUTO: 83.9 FL (ref 80–94)
MONOCYTES # BLD AUTO: 0.34 X10(3)/MCL (ref 0.1–1.3)
MONOCYTES NFR BLD AUTO: 8.1 %
NEUTROPHILS # BLD AUTO: 2.49 X10(3)/MCL (ref 2.1–9.2)
NEUTROPHILS NFR BLD AUTO: 59.4 %
NRBC BLD AUTO-RTO: 0 %
PLATELET # BLD AUTO: 130 X10(3)/MCL (ref 130–400)
PMV BLD AUTO: 11.1 FL (ref 7.4–10.4)
POTASSIUM SERPL-SCNC: 3.7 MMOL/L (ref 3.5–5.1)
PROT SERPL-MCNC: 6.2 GM/DL (ref 5.8–7.6)
RBC # BLD AUTO: 4.11 X10(6)/MCL (ref 4.2–5.4)
SODIUM SERPL-SCNC: 142 MMOL/L (ref 136–145)
WBC # SPEC AUTO: 4.2 X10(3)/MCL (ref 4.5–11.5)

## 2023-05-19 PROCEDURE — 25000003 PHARM REV CODE 250: Performed by: EMERGENCY MEDICINE

## 2023-05-19 PROCEDURE — 99233 PR SUBSEQUENT HOSPITAL CARE,LEVL III: ICD-10-PCS | Mod: 95,,, | Performed by: INTERNAL MEDICINE

## 2023-05-19 PROCEDURE — 97535 SELF CARE MNGMENT TRAINING: CPT | Mod: CO

## 2023-05-19 PROCEDURE — 99233 SBSQ HOSP IP/OBS HIGH 50: CPT | Mod: 95,,, | Performed by: INTERNAL MEDICINE

## 2023-05-19 PROCEDURE — G0378 HOSPITAL OBSERVATION PER HR: HCPCS

## 2023-05-19 PROCEDURE — 97162 PT EVAL MOD COMPLEX 30 MIN: CPT

## 2023-05-19 PROCEDURE — 92523 SPEECH SOUND LANG COMPREHEN: CPT

## 2023-05-19 PROCEDURE — 80053 COMPREHEN METABOLIC PANEL: CPT | Performed by: INTERNAL MEDICINE

## 2023-05-19 PROCEDURE — 85025 COMPLETE CBC W/AUTO DIFF WBC: CPT | Performed by: INTERNAL MEDICINE

## 2023-05-19 PROCEDURE — 25000003 PHARM REV CODE 250: Performed by: INTERNAL MEDICINE

## 2023-05-19 RX ORDER — SERTRALINE HYDROCHLORIDE 100 MG/1
50 TABLET, FILM COATED ORAL DAILY
Qty: 45 TABLET | Refills: 2 | Status: SHIPPED | OUTPATIENT
Start: 2023-05-19 | End: 2023-05-19 | Stop reason: HOSPADM

## 2023-05-19 RX ORDER — SERTRALINE HYDROCHLORIDE 50 MG/1
50 TABLET, FILM COATED ORAL DAILY
Status: DISCONTINUED | OUTPATIENT
Start: 2023-05-19 | End: 2023-05-22 | Stop reason: HOSPADM

## 2023-05-19 RX ORDER — QUETIAPINE FUMARATE 25 MG/1
25 TABLET, FILM COATED ORAL DAILY
Qty: 30 TABLET | Refills: 11 | Status: SHIPPED | OUTPATIENT
Start: 2023-05-19 | End: 2023-08-08 | Stop reason: SDUPTHER

## 2023-05-19 RX ORDER — QUETIAPINE FUMARATE 25 MG/1
25 TABLET, FILM COATED ORAL DAILY
Status: DISCONTINUED | OUTPATIENT
Start: 2023-05-19 | End: 2023-05-22 | Stop reason: HOSPADM

## 2023-05-19 RX ORDER — SERTRALINE HYDROCHLORIDE 50 MG/1
50 TABLET, FILM COATED ORAL DAILY
Qty: 30 TABLET | Refills: 11 | Status: SHIPPED | OUTPATIENT
Start: 2023-05-19 | End: 2023-11-22

## 2023-05-19 RX ADMIN — METOPROLOL SUCCINATE 12.5 MG: 25 TABLET, EXTENDED RELEASE ORAL at 05:05

## 2023-05-19 RX ADMIN — DONEPEZIL HYDROCHLORIDE 10 MG: 5 TABLET, FILM COATED ORAL at 08:05

## 2023-05-19 RX ADMIN — QUETIAPINE FUMARATE 25 MG: 25 TABLET ORAL at 05:05

## 2023-05-19 RX ADMIN — ATORVASTATIN CALCIUM 20 MG: 10 TABLET, FILM COATED ORAL at 08:05

## 2023-05-19 RX ADMIN — MEMANTINE 5 MG: 5 TABLET ORAL at 05:05

## 2023-05-19 RX ADMIN — Medication 6 MG: at 08:05

## 2023-05-19 RX ADMIN — ASPIRIN 81 MG 81 MG: 81 TABLET ORAL at 05:05

## 2023-05-19 RX ADMIN — CETIRIZINE HYDROCHLORIDE 10 MG: 10 TABLET, FILM COATED ORAL at 05:05

## 2023-05-19 RX ADMIN — SERTRALINE HYDROCHLORIDE 50 MG: 50 TABLET ORAL at 05:05

## 2023-05-19 RX ADMIN — PANTOPRAZOLE SODIUM 40 MG: 40 TABLET, DELAYED RELEASE ORAL at 05:05

## 2023-05-19 NOTE — PLAN OF CARE
Problem: Adult Inpatient Plan of Care  Goal: Plan of Care Review  Outcome: Ongoing, Progressing  Goal: Patient-Specific Goal (Individualized)  Outcome: Ongoing, Progressing  Goal: Absence of Hospital-Acquired Illness or Injury  Outcome: Ongoing, Progressing  Goal: Optimal Comfort and Wellbeing  Outcome: Ongoing, Progressing  Goal: Readiness for Transition of Care  Outcome: Ongoing, Progressing     Problem: Fall Injury Risk  Goal: Absence of Fall and Fall-Related Injury  Outcome: Ongoing, Progressing   PLAN OF CARE REVIEWED WITH PT AND PT'S FAMILY.  PT'S FAMILY AT BEDSIDE.  PT AA+OX1 (TO SELF).  ABLE TO MAKE NEEDS KNOWN.  DOES NOT APPEAR TO BE IN ANY DISTRESS.  NO C/O PAIN.  WILL CONTINUE TO REASSESS PAIN.  REQUIRES STAND BY ASSIST WITH ADLS.  CONT. OF B/B.  PT REFUSED TO ALLOW STAFF TO COMPLETE ORDERED MRI.  PT REMAINS FREE FROM FALLS, INJURY, AND TRAUMA.  FALL PRECAUTIONS IN PLACE.  BED IN LOWEST POSITION WITH WHEELS LOCKED.  CALL LIGHT WITHIN REACH.  WILL CONTINUE TO MONITOR.

## 2023-05-19 NOTE — PLAN OF CARE
Problem: Physical Therapy  Goal: Physical Therapy Goal  Description: Goals to be met by: 2023     Patient will increase functional independence with mobility by performin. Supine to sit with Modified [place]  2. Sit to supine with Modified [place]  3. Sit to stand transfer with Supervision  4. Gait  x 200 feet with Supervision using Rolling Walker.     Outcome: Ongoing, Progressing

## 2023-05-19 NOTE — PT/OT/SLP PROGRESS
Occupational Therapy   Treatment    Name: Grace Allen  MRN: [id number]  Admitting Diagnosis:  <principal problem not specified>       Recommendations:     Discharge Recommendations: nursing facility, skilled, nursing facility, basic (if DC home, requires 24h care)  Discharge Equipment Recommendations:  walker, rolling  Barriers to discharge:       Assessment:     Grace Allen is a 77 y.o. female with a medical diagnosis of AMS.  She presents with confusion and agreeable to OT session. Performance deficits affecting function are weakness, impaired endurance, impaired cognition, pain, impaired self care skills, impaired functional mobility, decreased safety awareness, impaired balance.     Rehab Prognosis:  Good; patient would benefit from acute skilled OT services to address these deficits and reach maximum level of function.       Plan:     Patient to be seen 5 x/week to address the above listed problems via self-care/home management, therapeutic activities, therapeutic exercises  Plan of Care Expires:    Plan of Care Reviewed with: patient, family    Subjective     Pain/Comfort:       Objective:     Communicated with: Patient found up in chair with peripheral IV upon OT entry to room.    General Precautions: Standard,      Orthopedic Precautions:   Braces:    Respiratory Status: Room air  Vital Signs: Respiratory Status: on room air     Occupational Performance:       Functional Mobility/Transfers:  Functional Mobility: BS recliner<>restroom for oral hygiene task with min A for RW coordination and safety.     Activities of Daily Living:  Grooming: minimum assistance oral hygiene prep and standby cues for proper execution of task.       Therapeutic Positioning    OT interventions performed during the course of today's session in an effort to prevent and/or reduce acquired pressure injuries:   Therapeutic positioning completed     Skin assessment: all bony prominences were assessed    Findings: no redness or  breakdown noted    [facility] 6 Click ADL:      Patient Education:  Patient and family provided with verbal education regarding OT role/goals/POC, fall prevention, safety awareness, and Discharge/DME recommendations.  Understanding was verbalized, however additional teaching warranted.      Patient left up in chair with all lines intact, call button in reach, and niece present    GOALS:   Multidisciplinary Problems       Occupational Therapy Goals          Problem: Occupational Therapy    Goal Priority Disciplines Outcome Interventions   Occupational Therapy Goal     OT, PT/OT Ongoing, Progressing    Description: Pt will ambulate to bathroom with least restrictive AD SBA  Pt will complete LE dressing with SBA  Pt will complete grooming in standing at sink SBA  Pt will complete toileting tasks SBA  Pt will complete toilet t/f SBA                       Time Tracking:     OT Date of Treatment: 05/19/23  OT Start Time: 1010  OT Stop Time: 1033  OT Total Time (min): 23 min    Billable Minutes:Self Care/Home Management 23    OT/ESTHER: ESTHER     Number of ESTHER visits since last OT visit: 1    5/19/2023

## 2023-05-19 NOTE — PT/OT/SLP EVAL
Speech Language Pathology Department  Cognitive-Communication Evaluation    Patient Name:  Grace Allen   MRN:  [id number]    Recommendations:     General recommendations:  cognitive-linguistic therapy  Communication strategies:  provide increased time to answer and go to room if call light pushed    Discharge recommendations: home with home health and 24 hour supervision vs nursing home  Barriers to safe discharge: safety awareness    History:     Grace Allen is a/n 77 y.o. female admitted for CVA work up.     Past Medical History:   Diagnosis Date    Alzheimer's dementia 8/19/2022    CAD (coronary artery disease)     Dyslipidemia     Hypertension      Past Surgical History:   Procedure Laterality Date    CHOLECYSTECTOMY         Previous level of Function  Education: some college per pt. Unknown if this is accurate given Dementia  Occupation: NA  Lives:  with daughter; case management state she goes to an Adult day care program M-F   Handed: Right  Glasses: yes  Hearing Aids: no  Home Responsibilities:  NA    Subjective     Patient alert, cooperative, and confused.    Patient goals: to return home at [facility]     Spiritual/Cultural/Alevism Beliefs/Practices that affect care: no  Pain/Comfort: none  Respiratory Status: Room air    Objective:     ORAL MUSCULATURE  Dentition: own teeth and adequate  Facial Movement: WFL  Buccal Strength & Mobility: WFL  Mandibular Strength & Mobility: WFL  Oral Labial Strength & Mobility: WFL  Lingual Strength & Mobility: WFL    SPEECH PRODUCTION  Phoneme Production: adequate  Voice Quality: adequate  Voice Production: adequate  Speech Rate: appropriate  Loudness: acceptable  Respiration: WFL for speech  Resonance: adequate  Prosody: adequate  Speech Intelligibility  Known Context: Greater that 90%  Unknown Context: Greater that 90%    AUDITORY COMPREHENSION  Identification:  Objects: requires multiple verbal instructions; able to identify objects within a field of 2-4 objects with  80% accuracy  Following Directions:  1-Step: 50%; improved with visual/verbal assistance  2-Step: 0%  Yes/No Questions:  Biographical: 100%; requires increased processing times and multiple repetitions of question  Environmental: 50%  Simple: 50%  Complex: 50%    VERBAL EXPRESSION  Automatic Speech:  Days of the week: 0% despite multiple attempts; may benefit from visual aid  Months of the year: 100% x1 out 3 trials  Counting: 10% difficulties transitioning between task    Confrontation Naming  Objects: 0%  Wh- Questions:  Object name: 0%  Object function: 0%    COGNITION  Orientation:  Person: name- yes; - no  Place: able to provide correct response with yes/no questions; unable to spontaneously provide answer  Time: no  Situation: no   Attention:  Focused: impaired  Sustained: impaired   Alternating: impaired  Pragmatics:  Eye contact: WFL  Facial expression: impaired  Memory:  Immediate: 0%  Delayed: 0%  Long Term: 0%  Problem Solving  Functional simple: impaired  Organization:  Convergent thinking: impaired  Divergent thinking: impaired  Executive Function:  Attention to detail: impaired  Information processing: impaired  Reasoning: impaired    Assessment:     Pt presents with mod-severe cognitive-communicative deficits impacting her safe, independent functioning and overall communication competence.     Goals:     Multidisciplinary Problems       SLP Goals          Problem: SLP    Goal Priority Disciplines Outcome   SLP Goal     SLP Ongoing, Progressing   Description: LTG: Pt will improve her cognitive communication skills with caregiver support as need to improve her safe, independent functioning and overall communication competence.     STG: Pt will increase her orientation skills with use of visual aids.   STG: Pt will demonstrate auditory comprehension of simple yes/no questions with 70% accuracy in order to communicate functional thoughts, needs, wants and/or feelings.                        Patient  Education:     Patient and family provided with verbal education regarding results/recommendations.  Understanding was verbalized, however additional teaching warranted.    Plan:     SLP Follow-Up: yes  Patient to be seen:  2-3 times per week     Time Tracking:     SLP Treatment Date:   5/19/2023  Speech Start Time:  1100  Speech Stop Time:  1145    Speech Total Time (min):  45    Billable minutes:  Evaluation of Speech Sound Production with Comprehension and Expression, 45 minutes     05/19/2023

## 2023-05-19 NOTE — PROGRESS NOTES
[facility] Medicine Progress Note        Chief Complaint: Inpatient Follow-up for AMS    HPI: 77-year-old -American female here admitted for cognitive impairment difficulty with word finding that started on 05/17/2023 she has a history of Alzheimer's dementia.  She is followed by Leobardo Parsons for her dementia.  She is a very pleasant patient and she lives with her daughter her neighbor Grace is here at the bedside today and states that symptoms started yesterday she noted at a home owners association meeting that Ms. Edwards was more confused than her usual and had trouble getting her words out.  She is followed by Dr. Padilla for Cards     She is lethargic this morning.  She is arousable and does answer questions but you can tell that she has trouble with finding the words.  Initial workup in the ED is negative, CT of the head is negative.     Patient does follow directions but has to be prompted several several times.    Echocardiogram reviewed and she has got some hypokinesis noted to the septum EF at 40-50%; she will need outpatient follow-up with her cardiologist - she denies any chest pain at current but will need outpatient stress testing if not performed in the recent past    Interval Hx:   Patient unable to complete MRI it was not attempted until last night but patient was actively sundowning which is not uncommon for her.  This morning she is pleasant in bed, at her baseline of confusion.   She doesn't have much of an appetite.     Objective/physical exam:  GENERAL: Awake and in NAD  HEENT: NC/AT, EOMI, PERRL.  NECK: Supple,  No JVD  LUNGS: CTA B/L  CVS: RRR, S1S2 positive  GI/: Soft, NT/ND, bowel sounds positive.  EXTREMITIES: Peripheral pulses 2+, no peripheral edema  DERM: Warm, dry.  No rashes or lesions noted.  NEURO: AAOx0, no focal neurologic deficit, trouble with word finding/ expression  PSYCH: Cooperative    VITAL SIGNS: 24 HRS MIN & MAX LAST   Temp   Min: 97.4 °F (36.3 °C)  Max: 97.9 °F (36.6 °C) 97.4 °F (36.3 °C)   BP  Min: 78/51  Max: 147/90 (!) 147/90   Pulse  Min: 56  Max: 77  61   Resp  Min: 18  Max: 18 18   SpO2  Min: 95 %  Max: 100 % 99 %       Recent Labs   Lab 05/17/23 1849 05/19/23 0449   WBC 6.42 4.20*   RBC 4.45 4.11*   HGB 12.0 11.1*   HCT 38.6 34.5*   MCV 86.7 83.9   MCH 27.0 27.0   MCHC 31.1* 32.2*   RDW 15.3 15.2    130   MPV 10.8* 11.1*       Recent Labs   Lab 05/17/23 1849 05/18/23  0932 05/19/23 0449     --  142   K 4.1  --  3.7   CO2 24  --  23   BUN 19.8  --  16.4   CREATININE 1.43* 1.24* 1.41*   CALCIUM 10.2  --  9.6   ALBUMIN 4.4  --  3.6   ALKPHOS 60  --  54   ALT 27  --  20   AST 33  --  23   BILITOT 0.5  --  0.4          Microbiology Results (last 7 days)       Procedure Component Value Units Date/Time    Urine culture [[id number]] Collected: 05/17/23 1850    Order Status: Completed Specimen: Urine, Clean Catch Updated: 05/19/23 0705     Urine Culture No Growth             See below for Radiology    Scheduled Med:   aspirin  81 mg Oral Daily    atorvastatin  20 mg Oral QHS    cetirizine  10 mg Oral Daily    donepeziL  10 mg Oral QHS    memantine  5 mg Oral BID    metoprolol succinate  12.5 mg Oral Daily    pantoprazole  40 mg Oral Daily    QUEtiapine  25 mg Oral Daily    sertraline  150 mg Oral Daily        Continuous Infusions: none       PRN Meds:  acetaminophen, melatonin, ondansetron, sodium chloride 0.9%       Assessment/Plan:  Patient Active Problem List   Diagnosis    Arteriosclerosis of coronary artery    Dyslipidemia    Hypertension    Alzheimer's dementia    Falls frequently    AMS (altered mental status)    Re-attempt MRI this morning, discussed with nursing okay to give a 1 time 0.5 mg of Ativan if needed they have Seroquel ordered for this morning I asked the nurse to please move this to around 5 p.m. to be given which is typically her son down in time.  Discussed with daughter at the bedside that if we do  not find anything on MRI of the likely we are just dealing with Alzheimer's progression.  Patient is safe to go home with family member this weekend once completion of all her studies and has done some speech therapy.  I do not see where they have seen her yet.    Patient condition:  Stable    Anticipated discharge and Disposition: tomorrow to home with daughter      All diagnosis and differential diagnosis have been reviewed; assessment and plan has been documented; I have personally reviewed the labs and test results that are presently available; I have reviewed the patients medication list; I have reviewed the consulting providers response and recommendations. I have reviewed or attempted to review medical records based upon their availability    All of the patient's questions have been  addressed and answered. Patient's is agreeable to the above stated plan. I will continue to monitor closely and make adjustments to medical management as needed.    Med rec completed      Nutrition Status: Regular      Echo  Addendum: · The estimated ejection fraction is 45-50%.   · Mildly decreased systolic function.   · Grade I left ventricular diastolic dysfunction.   · Normal right ventricular size with normal right ventricular systolic  function.   · Mild-to-moderate aortic regurgitation.   · Mild tricuspid regurgitation.   · There is left ventricular global hypokinesis.   · There is abnormal septal wall motion consistent with left bundle branch  block.  Narrative: · The estimated ejection fraction is 45-50%.  · Mildly decreased systolic function.  · Grade I left ventricular diastolic dysfunction.  · Normal right ventricular size with normal right ventricular systolic   function.  · Mild-to-moderate aortic regurgitation.  · Mild tricuspid regurgitation.  · There is left ventricular global hypokinesis.         Dewey Cazares MD   05/19/2023

## 2023-05-19 NOTE — PT/OT/SLP EVAL
Physical Therapy Evaluation    Patient Name:  Grace Allen   MRN:  [id number]    Recommendations:     Discharge Recommendations: nursing facility, skilled, home with home health   Discharge Equipment Recommendations: walker, rolling   Barriers to discharge: Impaired mobility    Assessment:     Grace Allen is a 77 y.o. female admitted with a medical diagnosis of AMS.  She presents with the following impairments/functional limitations: weakness, impaired endurance, impaired functional mobility, gait instability .    Rehab Prognosis: Good; patient would benefit from acute skilled PT services to address these deficits and reach maximum level of function.    Recent Surgery: * No surgery found *      Plan:     During this hospitalization, patient to be seen 6 x/week to address the identified rehab impairments via gait training, therapeutic activities, therapeutic exercises, neuromuscular re-education and progress toward the following goals:    Plan of Care Expires:  06/18/23    Subjective     Chief Complaint: dizzines with walking  Patient/Family Comments/goals: to return to PLOF  Pain/Comfort:  Pain Rating 1: 0/10    Patients cultural, spiritual, Voodoo conflicts given the current situation: no    Living Environment:  Pt lives with her daugther in a SL home, no steps. Pt has sitters during the day to supervise her.   Prior to admission, patients level of function was independent.  Equipment used at home: none.  DME owned (not currently used): none.  Upon discharge, patient will have assistance from daughter and sitters..    Objective:     Communicated with RN prior to session.  Patient found HOB elevated with peripheral IV  upon PT entry to room.    General Precautions: Standard,    Orthopedic Precautions:    Braces:    Respiratory Status: Room air  Blood Pressure: 163/100mmHg after c/o dizziness with ambulating ~8 ft. RN notified.      Exams:  Cognitive Exam:  Patient is oriented to Person and Place  E  Strength: WFL  LLE Strength: WFL  Skin integrity: Visible skin intact      Functional Mobility:  Bed Mobility:     Supine to Sit: minimum assistance  Transfers:     Sit to Stand:  minimum assistance with rolling walker  Gait: pt demo'd a slow step through gt pattern w/ short step length bilat x 8 ft w/ use of RW and Seven. Limited by dizziness.       AM-PAC 6 CLICK MOBILITY  Total Score:18       Treatment & Education:    Patient provided with verbal education regarding PT POC.  Understanding was verbalized.     Patient left up in chair with all lines intact, call button in reach, and RN notified. Daughter present to stay with patient in room.     GOALS:   Multidisciplinary Problems       Physical Therapy Goals          Problem: Physical Therapy    Goal Priority Disciplines Outcome Goal Variances Interventions   Physical Therapy Goal     PT, PT/OT Ongoing, Progressing     Description: Goals to be met by: 2023     Patient will increase functional independence with mobility by performin. Supine to sit with Modified [place]  2. Sit to supine with Modified [place]  3. Sit to stand transfer with Supervision  4. Gait  x 200 feet with Supervision using Rolling Walker.                          History:     Past Medical History:   Diagnosis Date    Alzheimer's dementia 2022    CAD (coronary artery disease)     Dyslipidemia     Hypertension        Past Surgical History:   Procedure Laterality Date    CHOLECYSTECTOMY         Time Tracking:     PT Received On: 23  PT Start Time: 812     PT Stop Time: 824  PT Total Time (min): 12 min     Billable Minutes: Evaluation , moderate complexity      2023

## 2023-05-19 NOTE — PLAN OF CARE
Problem: SLP  Goal: SLP Goal  Description: LTG: Pt will improve her cognitive communication skills with caregiver support as need to improve her safe, independent functioning and overall communication competence.     STG: Pt will increase her orientation skills with use of visual aids.   STG: Pt will demonstrate auditory comprehension of simple yes/no questions with 70% accuracy in order to communicate functional thoughts, needs, wants and/or feelings.     Outcome: Ongoing, Progressing

## 2023-05-20 LAB
ALBUMIN SERPL-MCNC: 3.6 G/DL (ref 3.4–4.8)
ALBUMIN/GLOB SERPL: 1.4 RATIO (ref 1.1–2)
ALP SERPL-CCNC: 55 UNIT/L (ref 40–150)
ALT SERPL-CCNC: 20 UNIT/L (ref 0–55)
AST SERPL-CCNC: 24 UNIT/L (ref 5–34)
BASOPHILS # BLD AUTO: 0.02 X10(3)/MCL
BASOPHILS NFR BLD AUTO: 0.4 %
BILIRUBIN DIRECT+TOT PNL SERPL-MCNC: 0.4 MG/DL
BUN SERPL-MCNC: 18.2 MG/DL (ref 9.8–20.1)
CALCIUM SERPL-MCNC: 9.9 MG/DL (ref 8.4–10.2)
CHLORIDE SERPL-SCNC: 109 MMOL/L (ref 98–107)
CO2 SERPL-SCNC: 23 MMOL/L (ref 23–31)
CREAT SERPL-MCNC: 1.58 MG/DL (ref 0.55–1.02)
EOSINOPHIL # BLD AUTO: 0.52 X10(3)/MCL (ref 0–0.9)
EOSINOPHIL NFR BLD AUTO: 10.7 %
ERYTHROCYTE [DISTWIDTH] IN BLOOD BY AUTOMATED COUNT: 15.5 % (ref 11.5–17)
GFR SERPLBLD CREATININE-BSD FMLA CKD-EPI: 33 MLS/MIN/1.73/M2
GLOBULIN SER-MCNC: 2.6 GM/DL (ref 2.4–3.5)
GLUCOSE SERPL-MCNC: 109 MG/DL (ref 82–115)
HCT VFR BLD AUTO: 34.4 % (ref 37–47)
HGB BLD-MCNC: 11.1 G/DL (ref 12–16)
IMM GRANULOCYTES # BLD AUTO: 0.01 X10(3)/MCL (ref 0–0.04)
IMM GRANULOCYTES NFR BLD AUTO: 0.2 %
LEFT CCA DIST DIAS: 12 CM/S
LEFT CCA DIST SYS: 77 CM/S
LEFT CCA PROX DIAS: 7 CM/S
LEFT CCA PROX SYS: 66 CM/S
LEFT ECA DIAS: 7 CM/S
LEFT ECA SYS: 41 CM/S
LEFT ICA DIST DIAS: 11 CM/S
LEFT ICA DIST SYS: 42 CM/S
LEFT ICA MID DIAS: 13 CM/S
LEFT ICA MID SYS: 48 CM/S
LEFT ICA PROX DIAS: 12 CM/S
LEFT ICA PROX SYS: 45 CM/S
LEFT VERTEBRAL DIAS: 5 CM/S
LEFT VERTEBRAL SYS: 36 CM/S
LYMPHOCYTES # BLD AUTO: 1.26 X10(3)/MCL (ref 0.6–4.6)
LYMPHOCYTES NFR BLD AUTO: 25.8 %
MCH RBC QN AUTO: 27.4 PG (ref 27–31)
MCHC RBC AUTO-ENTMCNC: 32.3 G/DL (ref 33–36)
MCV RBC AUTO: 84.9 FL (ref 80–94)
MONOCYTES # BLD AUTO: 0.46 X10(3)/MCL (ref 0.1–1.3)
MONOCYTES NFR BLD AUTO: 9.4 %
NEUTROPHILS # BLD AUTO: 2.61 X10(3)/MCL (ref 2.1–9.2)
NEUTROPHILS NFR BLD AUTO: 53.5 %
NRBC BLD AUTO-RTO: 0 %
OHS CV CAROTID RIGHT ICA EDV HIGHEST: 13
OHS CV CAROTID ULTRASOUND LEFT ICA/CCA RATIO: 0.62
OHS CV CAROTID ULTRASOUND RIGHT ICA/CCA RATIO: 1.05
OHS CV PV CAROTID LEFT HIGHEST CCA: 77
OHS CV PV CAROTID LEFT HIGHEST ICA: 48
OHS CV PV CAROTID RIGHT HIGHEST CCA: 58
OHS CV PV CAROTID RIGHT HIGHEST ICA: 61
OHS CV US CAROTID LEFT HIGHEST EDV: 13
PLATELET # BLD AUTO: 135 X10(3)/MCL (ref 130–400)
PMV BLD AUTO: 10.8 FL (ref 7.4–10.4)
POTASSIUM SERPL-SCNC: 4 MMOL/L (ref 3.5–5.1)
PROT SERPL-MCNC: 6.2 GM/DL (ref 5.8–7.6)
RBC # BLD AUTO: 4.05 X10(6)/MCL (ref 4.2–5.4)
RIGHT CCA DIST DIAS: 11 CM/S
RIGHT CCA DIST SYS: 58 CM/S
RIGHT CCA PROX DIAS: 7 CM/S
RIGHT CCA PROX SYS: 46 CM/S
RIGHT ECA DIAS: 0 CM/S
RIGHT ECA SYS: 48 CM/S
RIGHT ICA DIST DIAS: 7 CM/S
RIGHT ICA DIST SYS: 39 CM/S
RIGHT ICA MID DIAS: 13 CM/S
RIGHT ICA MID SYS: 52 CM/S
RIGHT ICA PROX DIAS: 11 CM/S
RIGHT ICA PROX SYS: 61 CM/S
RIGHT VERTEBRAL DIAS: 4 CM/S
RIGHT VERTEBRAL SYS: 23 CM/S
SODIUM SERPL-SCNC: 141 MMOL/L (ref 136–145)
WBC # SPEC AUTO: 4.88 X10(3)/MCL (ref 4.5–11.5)

## 2023-05-20 PROCEDURE — 25000003 PHARM REV CODE 250: Performed by: INTERNAL MEDICINE

## 2023-05-20 PROCEDURE — 99233 SBSQ HOSP IP/OBS HIGH 50: CPT | Mod: 95,,, | Performed by: INTERNAL MEDICINE

## 2023-05-20 PROCEDURE — G0378 HOSPITAL OBSERVATION PER HR: HCPCS

## 2023-05-20 PROCEDURE — 80053 COMPREHEN METABOLIC PANEL: CPT | Performed by: INTERNAL MEDICINE

## 2023-05-20 PROCEDURE — 99233 PR SUBSEQUENT HOSPITAL CARE,LEVL III: ICD-10-PCS | Mod: 95,,, | Performed by: INTERNAL MEDICINE

## 2023-05-20 PROCEDURE — 25000003 PHARM REV CODE 250: Performed by: EMERGENCY MEDICINE

## 2023-05-20 PROCEDURE — 85025 COMPLETE CBC W/AUTO DIFF WBC: CPT | Performed by: INTERNAL MEDICINE

## 2023-05-20 RX ORDER — POLYETHYLENE GLYCOL 3350 17 G/17G
17 POWDER, FOR SOLUTION ORAL DAILY
Status: DISCONTINUED | OUTPATIENT
Start: 2023-05-20 | End: 2023-05-22 | Stop reason: HOSPADM

## 2023-05-20 RX ADMIN — DOCUSATE SODIUM 50 MG: 50 CAPSULE, LIQUID FILLED ORAL at 02:05

## 2023-05-20 RX ADMIN — Medication 6 MG: at 08:05

## 2023-05-20 RX ADMIN — SERTRALINE HYDROCHLORIDE 50 MG: 50 TABLET ORAL at 08:05

## 2023-05-20 RX ADMIN — POLYETHYLENE GLYCOL 3350 17 G: 17 POWDER, FOR SOLUTION ORAL at 02:05

## 2023-05-20 RX ADMIN — MEMANTINE 5 MG: 5 TABLET ORAL at 09:05

## 2023-05-20 RX ADMIN — QUETIAPINE FUMARATE 25 MG: 25 TABLET ORAL at 08:05

## 2023-05-20 RX ADMIN — MEMANTINE 5 MG: 5 TABLET ORAL at 08:05

## 2023-05-20 RX ADMIN — PANTOPRAZOLE SODIUM 40 MG: 40 TABLET, DELAYED RELEASE ORAL at 09:05

## 2023-05-20 RX ADMIN — METOPROLOL SUCCINATE 12.5 MG: 25 TABLET, EXTENDED RELEASE ORAL at 09:05

## 2023-05-20 RX ADMIN — ATORVASTATIN CALCIUM 20 MG: 10 TABLET, FILM COATED ORAL at 08:05

## 2023-05-20 RX ADMIN — DONEPEZIL HYDROCHLORIDE 10 MG: 5 TABLET, FILM COATED ORAL at 08:05

## 2023-05-20 RX ADMIN — CETIRIZINE HYDROCHLORIDE 10 MG: 10 TABLET, FILM COATED ORAL at 09:05

## 2023-05-20 RX ADMIN — ASPIRIN 81 MG 81 MG: 81 TABLET ORAL at 09:05

## 2023-05-20 NOTE — PLAN OF CARE
Problem: Adult Inpatient Plan of Care  Goal: Plan of Care Review  Outcome: Ongoing, Progressing  Goal: Patient-Specific Goal (Individualized)  Outcome: Ongoing, Progressing  Goal: Absence of Hospital-Acquired Illness or Injury  Outcome: Ongoing, Progressing  Goal: Optimal Comfort and Wellbeing  Outcome: Ongoing, Progressing  Goal: Readiness for Transition of Care  Outcome: Ongoing, Progressing     Problem: Fall Injury Risk  Goal: Absence of Fall and Fall-Related Injury  Outcome: Ongoing, Progressing   PLAN OF CARE REVIEWED WITH PT AND PT'S FAMILY.  PT'S FAMILY AT BEDSIDE.  PT AA+OX1 (TO SELF).  ABLE TO MAKE NEEDS KNOWN.  DOES NOT APPEAR TO BE IN ANY DISTRESS.  NO C/O PAIN.  WILL CONTINUE TO REASSESS PAIN.  REQUIRES STAND BY ASSIST WITH ADLS.  CONT. OF B/B.  PT REMAINS FREE FROM FALLS, INJURY, AND TRAUMA.  FALL PRECAUTIONS IN PLACE.  BED IN LOWEST POSITION WITH WHEELS LOCKED.  CALL LIGHT WITHIN REACH.  WILL CONTINUE TO MONITOR.

## 2023-05-20 NOTE — PROGRESS NOTES
Progress Note  Hospital Medicine    Patient Name: Grace Allen  YOB: 1945    Admit Date: 5/17/2023                     LOS: 2    SUBJECTIVE:     Reason for Admission:  <principal problem not specified>  See H&P for detailed presentating history and ROS.      Interval history: unable  to void  , still constipated  and had a really bad night last night . Daughter wants to wait till Monday for DC      OBJECTIVE:     Vital Signs Range (Last 24H):  Temp:  [97.8 °F (36.6 °C)-98.6 °F (37 °C)]   Pulse:  [58-77]   Resp:  [18]   BP: ()/(55-89)   SpO2:  [95 %-98 %] Body mass index is 27.34 kg/m².  Wt Readings from Last 3 Encounters:   05/18/23 0208 63.5 kg (140 lb)   05/17/23 1832 63.5 kg (140 lb)   01/10/23 1529 65.8 kg (145 lb)   12/28/22 1056 65.8 kg (145 lb)       I & O (Last 24H):  Intake/Output Summary (Last 24 hours) at 5/20/2023 1258  Last data filed at 5/20/2023 0600  Gross per 24 hour   Intake 720 ml   Output --   Net 720 ml       Physical Exam:   Alert awake  in no distress    HEENt  WNL    HEART R1R2   LUNGS  CTA    ABD  soft and  depressible    EXT  no cce    Diagnostic Results:  Lab Results   Component Value Date    WBC 4.88 05/20/2023    HGB 11.1 (L) 05/20/2023    HCT 34.4 (L) 05/20/2023    MCV 84.9 05/20/2023     05/20/2023     Recent Labs   Lab 05/20/23  0434      K 4.0   CO2 23   BUN 18.2   CREATININE 1.58*   CALCIUM 9.9     Lab Results   Component Value Date    INR 1.07 05/17/2023    PROTIME 13.8 05/17/2023     Lab Results   Component Value Date    HGBA1C 5.6 01/20/2021     No results for input(s): POCTGLUCOSE in the last 72 hours.    ASSESSMENT/PLAN:     Active Hospital Problems    Diagnosis  POA    Cognitive impairment [R41.89]  Yes    AMS (altered mental status) [R41.82]  Yes    Falls frequently [R29.6]  Not Applicable    Dementia without behavioral disturbance [F03.90]  Yes    Dyslipidemia [E78.5]  Yes    Hypertension [I10]  Yes    Arteriosclerosis of coronary artery  [I25.10]  Yes      Resolved Hospital Problems   No resolved problems to display.        Problems Addressed Today:    No problem-specific Assessment & Plan notes found for this encounter.        DISCHARGE PLANNING:   Miralax  colace and change Seroquel to HS     Signing Physician:  Mike Meredith MD

## 2023-05-21 PROCEDURE — 99232 SBSQ HOSP IP/OBS MODERATE 35: CPT | Mod: 95,,, | Performed by: INTERNAL MEDICINE

## 2023-05-21 PROCEDURE — 25000003 PHARM REV CODE 250: Performed by: INTERNAL MEDICINE

## 2023-05-21 PROCEDURE — 25000003 PHARM REV CODE 250: Performed by: EMERGENCY MEDICINE

## 2023-05-21 PROCEDURE — 96375 TX/PRO/DX INJ NEW DRUG ADDON: CPT

## 2023-05-21 PROCEDURE — 97530 THERAPEUTIC ACTIVITIES: CPT | Mod: CQ

## 2023-05-21 PROCEDURE — 63600175 PHARM REV CODE 636 W HCPCS: Performed by: EMERGENCY MEDICINE

## 2023-05-21 PROCEDURE — 99232 PR SUBSEQUENT HOSPITAL CARE,LEVL II: ICD-10-PCS | Mod: 95,,, | Performed by: INTERNAL MEDICINE

## 2023-05-21 PROCEDURE — 94761 N-INVAS EAR/PLS OXIMETRY MLT: CPT

## 2023-05-21 PROCEDURE — G0378 HOSPITAL OBSERVATION PER HR: HCPCS

## 2023-05-21 PROCEDURE — 97116 GAIT TRAINING THERAPY: CPT | Mod: CQ

## 2023-05-21 RX ADMIN — QUETIAPINE FUMARATE 25 MG: 25 TABLET ORAL at 08:05

## 2023-05-21 RX ADMIN — ONDANSETRON 4 MG: 2 INJECTION INTRAMUSCULAR; INTRAVENOUS at 12:05

## 2023-05-21 RX ADMIN — DOCUSATE SODIUM 50 MG: 50 CAPSULE, LIQUID FILLED ORAL at 09:05

## 2023-05-21 RX ADMIN — CETIRIZINE HYDROCHLORIDE 10 MG: 10 TABLET, FILM COATED ORAL at 09:05

## 2023-05-21 RX ADMIN — Medication 6 MG: at 08:05

## 2023-05-21 RX ADMIN — POLYETHYLENE GLYCOL 3350 17 G: 17 POWDER, FOR SOLUTION ORAL at 09:05

## 2023-05-21 RX ADMIN — ATORVASTATIN CALCIUM 20 MG: 10 TABLET, FILM COATED ORAL at 08:05

## 2023-05-21 RX ADMIN — SERTRALINE HYDROCHLORIDE 50 MG: 50 TABLET ORAL at 08:05

## 2023-05-21 RX ADMIN — MEMANTINE 5 MG: 5 TABLET ORAL at 09:05

## 2023-05-21 RX ADMIN — METOPROLOL SUCCINATE 12.5 MG: 25 TABLET, EXTENDED RELEASE ORAL at 09:05

## 2023-05-21 RX ADMIN — MEMANTINE 5 MG: 5 TABLET ORAL at 08:05

## 2023-05-21 RX ADMIN — DONEPEZIL HYDROCHLORIDE 10 MG: 5 TABLET, FILM COATED ORAL at 08:05

## 2023-05-21 RX ADMIN — ASPIRIN 81 MG 81 MG: 81 TABLET ORAL at 09:05

## 2023-05-21 RX ADMIN — PANTOPRAZOLE SODIUM 40 MG: 40 TABLET, DELAYED RELEASE ORAL at 09:05

## 2023-05-21 NOTE — PLAN OF CARE
Problem: Adult Inpatient Plan of Care  Goal: Plan of Care Review  Outcome: Ongoing, Progressing  Goal: Patient-Specific Goal (Individualized)  Outcome: Ongoing, Progressing  Goal: Absence of Hospital-Acquired Illness or Injury  Outcome: Ongoing, Progressing  Goal: Optimal Comfort and Wellbeing  Outcome: Ongoing, Progressing  Goal: Readiness for Transition of Care  Outcome: Ongoing, Progressing     Problem: Fall Injury Risk  Goal: Absence of Fall and Fall-Related Injury  Outcome: Ongoing, Progressing   PLAN OF CARE REVIEWED WITH PT AND PT'S DAUGHTER.  PT'S DAUGTHER AT BEDSIDE.  PT'S PT AA+OX1 (TO SELF).  ABLE TO MAKE NEEDS KNOWN.  DOES NOT APPEAR TO BE IN ANY DISTRESS.  NO C/O PAIN.  WILL CONTINUE TO REASSESS PAIN.  REQUIRES STAND BY ASSIST WITH ADLS.  CONT. OF B/B.  PT REMAINS FREE FROM FALLS, INJURY, AND TRAUMA.  FALL PRECAUTIONS IN PLACE.  BED IN LOWEST POSITION WITH WHEELS LOCKED.  CALL LIGHT WITHIN REACH.  WILL CONTINUE TO MONITOR.

## 2023-05-21 NOTE — PROGRESS NOTES
Progress Note  Hospital Medicine    Patient Name: Grace Allen  YOB: 1945    Admit Date: 5/17/2023                     LOS: 2    SUBJECTIVE:     Reason for Admission:  <principal problem not specified>  See H&P for detailed presentating history and ROS.      Interval history:  Uneventful night according to nurse's report, patient is still very disoriented and confused      OBJECTIVE:     Vital Signs Range (Last 24H):  Temp:  [97.4 °F (36.3 °C)-98.2 °F (36.8 °C)]   Pulse:  [66-81]   Resp:  [18]   BP: (105-133)/(67-86)   SpO2:  [95 %-100 %] Body mass index is 27.34 kg/m².  Wt Readings from Last 3 Encounters:   05/18/23 0208 63.5 kg (140 lb)   05/17/23 1832 63.5 kg (140 lb)   01/10/23 1529 65.8 kg (145 lb)   12/28/22 1056 65.8 kg (145 lb)       I & O (Last 24H):  Intake/Output Summary (Last 24 hours) at 5/21/2023 1253  Last data filed at 5/21/2023 0553  Gross per 24 hour   Intake 720 ml   Output 100 ml   Net 620 ml       Physical Exam:  Patient awake in no obvious distress  Patient is totally confused talking nonsense,  HEENT within normal limits   Heart regular rhythm  Lungs are clear bilaterally   Abdomen soft and depressible  Extremities no edema    Diagnostic Results:  Lab Results   Component Value Date    WBC 4.88 05/20/2023    HGB 11.1 (L) 05/20/2023    HCT 34.4 (L) 05/20/2023    MCV 84.9 05/20/2023     05/20/2023     No results for input(s): GLU, NA, K, CL, CO2, BUN, CREATININE, CALCIUM, MG in the last 24 hours.  Lab Results   Component Value Date    INR 1.07 05/17/2023    PROTIME 13.8 05/17/2023     Lab Results   Component Value Date    HGBA1C 5.6 01/20/2021     No results for input(s): POCTGLUCOSE in the last 72 hours.    ASSESSMENT/PLAN:     Active Hospital Problems    Diagnosis  POA    Cognitive impairment [R41.89]  Yes    AMS (altered mental status) [R41.82]  Yes    Falls frequently [R29.6]  Not Applicable    Dementia without behavioral disturbance [F03.90]  Yes    Dyslipidemia  [E78.5]  Yes    Hypertension [I10]  Yes    Arteriosclerosis of coronary artery [I25.10]  Yes      Resolved Hospital Problems   No resolved problems to display.        Problems Addressed Today:    No problem-specific Assessment & Plan notes found for this encounter.        DISCHARGE PLANNING:     Family waiting on Dr. Wong in the morning for some guidance and apparently some special forms needs to be filled by attending physician  Signing Physician:  Mike Meredith MD

## 2023-05-21 NOTE — PT/OT/SLP PROGRESS
Physical Therapy Treatment    Patient Name:  Grace Allen   MRN:  [id number]    Recommendations:     Discharge Recommendations: nursing facility, skilled (if pt goes home, will need 24 hr care 2/2 fall risk and poor safety awareness.)  Discharge Equipment Recommendations: walker, rolling  Barriers to discharge:     Assessment:     Grace Allen is a 77 y.o. female admitted with a medical diagnosis of AMS.  She presents with the following impairments/functional limitations: weakness, gait instability, impaired endurance, impaired self care skills, impaired functional mobility.    Rehab Prognosis: Good; patient would benefit from acute skilled PT services to address these deficits and reach maximum level of function.    Recent Surgery: * No surgery found *      Plan:     During this hospitalization, patient to be seen 6 x/week to address the identified rehab impairments via gait training, therapeutic activities, neuromuscular re-education and progress toward the following goals:    Plan of Care Expires:  06/18/23    Subjective     Chief Complaint: Pt c/o wet gown from eating lunch. PTA assist pt with donning on new clean gown.   Patient/Family Comments/goals:   Pain/Comfort:  Pain Rating 1: 0/10      Objective:     Communicated with NSG prior to session.  Patient found HOB elevated with peripheral IV upon PTA entry to room.   Pt reluctant to get OOB, but eventually agreed with lots of encouragement.     General Precautions: Standard, fall  Orthopedic Precautions: N/A  Braces: N/A  Respiratory Status: Room air  Blood Pressure:   Skin Integrity: Visible skin intact      Functional Mobility:  Bed: SBA supine to sit EOB  T/F: Eric sit <-> stand   Gait with RW: Pt ambulated for approximately 180 ft, demonstrated step through gait pattern and easily distracted. Vcs for RW proximity and safety when negotiating obstacles. No major LOB.      Education:  Patient provided with verbal education regarding safety.  Understanding  was verbalized, however additional teaching warranted.     Patient left HOB elevated with all lines intact, call button in reach, and bed alarm on..    GOALS:   Multidisciplinary Problems       Physical Therapy Goals          Problem: Physical Therapy    Goal Priority Disciplines Outcome Goal Variances Interventions   Physical Therapy Goal     PT, PT/OT Ongoing, Progressing     Description: Goals to be met by: 2023     Patient will increase functional independence with mobility by performin. Supine to sit with Modified [place]  2. Sit to supine with Modified [place]  3. Sit to stand transfer with Supervision  4. Gait  x 200 feet with Supervision using Rolling Walker.                          Time Tracking:     PT Received On:    PT Start Time: 1258     PT Stop Time: 1321  PT Total Time (min): 23 min     Billable Minutes: Gait Training 15 and Therapeutic Activity 8    Treatment Type: Treatment  PT/PTA: PTA     Number of PTA visits since last PT visit: 2023

## 2023-05-22 VITALS
WEIGHT: 140 LBS | SYSTOLIC BLOOD PRESSURE: 161 MMHG | DIASTOLIC BLOOD PRESSURE: 80 MMHG | RESPIRATION RATE: 18 BRPM | TEMPERATURE: 98 F | HEART RATE: 60 BPM | OXYGEN SATURATION: 99 % | HEIGHT: 60 IN | BODY MASS INDEX: 27.48 KG/M2

## 2023-05-22 PROBLEM — F02.80 ALZHEIMER DISEASE: Chronic | Status: ACTIVE | Noted: 2023-05-22

## 2023-05-22 PROBLEM — G30.9 ALZHEIMER DISEASE: Chronic | Status: ACTIVE | Noted: 2023-05-22

## 2023-05-22 PROCEDURE — G0378 HOSPITAL OBSERVATION PER HR: HCPCS

## 2023-05-22 PROCEDURE — 25000003 PHARM REV CODE 250: Performed by: INTERNAL MEDICINE

## 2023-05-22 PROCEDURE — 99233 PR SUBSEQUENT HOSPITAL CARE,LEVL III: ICD-10-PCS | Mod: 95,,, | Performed by: INTERNAL MEDICINE

## 2023-05-22 PROCEDURE — 99233 SBSQ HOSP IP/OBS HIGH 50: CPT | Mod: 95,,, | Performed by: INTERNAL MEDICINE

## 2023-05-22 RX ORDER — POLYETHYLENE GLYCOL 3350 17 G/17G
17 POWDER, FOR SOLUTION ORAL 2 TIMES DAILY PRN
Refills: 0
Start: 2023-05-22

## 2023-05-22 RX ADMIN — DOCUSATE SODIUM 50 MG: 50 CAPSULE, LIQUID FILLED ORAL at 08:05

## 2023-05-22 RX ADMIN — CETIRIZINE HYDROCHLORIDE 10 MG: 10 TABLET, FILM COATED ORAL at 08:05

## 2023-05-22 RX ADMIN — MEMANTINE 5 MG: 5 TABLET ORAL at 08:05

## 2023-05-22 RX ADMIN — POLYETHYLENE GLYCOL 3350 17 G: 17 POWDER, FOR SOLUTION ORAL at 08:05

## 2023-05-22 RX ADMIN — METOPROLOL SUCCINATE 12.5 MG: 25 TABLET, EXTENDED RELEASE ORAL at 08:05

## 2023-05-22 RX ADMIN — ASPIRIN 81 MG 81 MG: 81 TABLET ORAL at 08:05

## 2023-05-22 RX ADMIN — PANTOPRAZOLE SODIUM 40 MG: 40 TABLET, DELAYED RELEASE ORAL at 08:05

## 2023-05-22 NOTE — NURSING
DC note: patient and family educated on care after DC. NAD noted. All questions answered. F/U appointments made and given to patient.

## 2023-05-22 NOTE — HOSPITAL COURSE
77-year-old -American female here admitted for cognitive impairment difficulty with word finding that started on 05/17/2023 she has a history of Alzheimer's dementia.  She is followed by Leobardo Parsons for her dementia.  She is a very pleasant patient and she lives with her daughter her neighbor Grace is here at the bedside today and states that symptoms started yesterday she noted at a home owners association meeting that Ms. Edwards was more confused than her usual and had trouble getting her words out.  She is followed by Dr. Padilla for Cards     She is lethargic this morning.  She is arousable and does answer questions but you can tell that she has trouble with finding the words.  Initial workup in the ED is negative, CT of the head is negative.  We ordered MRI/MRA of the brain with no acute focal findings or evidence of acute CVA.     Echocardiogram reviewed and she has got some hypokinesis noted to the septum EF at 40-50%; she will need outpatient follow-up with her cardiologist - she denies any chest pain at current but will need outpatient stress testing if not performed in the recent past.  She is awake this morning of is unoriented which is her baseline.  I discussed the case with the relative that was at the bedside and also with her daughter.  At the time of discharge patient questions have been answered, medications reconciled there are no new prescriptions.  There has been some dose adjustments to some of her medications.  Unfortunately it seems as though she is suffering from Alzheimer's disease progression and will require further symptom management as deemed necessary.  No further recommendations at this time.  Follow-up in the office in 1 week

## 2023-05-23 ENCOUNTER — TELEPHONE (OUTPATIENT)
Dept: INTERNAL MEDICINE | Facility: CLINIC | Age: 78
End: 2023-05-23
Payer: MEDICARE

## 2023-05-23 NOTE — TELEPHONE ENCOUNTER
----- Message from Emil Sexton MA sent at 5/23/2023  9:44 AM CDT -----  Regarding: FW: [facility]   Please advise if we have paperwork  ----- Message -----  From: Latoya Rock  Sent: 5/23/2023   9:40 AM CDT  To: [facility] Staff  Subject: [facility]                           Tess with [facility].is requesting that we fax the completed paperwork to [phone]. Thanks

## 2023-05-30 ENCOUNTER — TELEPHONE (OUTPATIENT)
Dept: INTERNAL MEDICINE | Facility: CLINIC | Age: 78
End: 2023-05-30
Payer: MEDICARE

## 2023-05-30 NOTE — TELEPHONE ENCOUNTER
----- Message from Alma Mercado sent at 5/29/2023 12:31 PM CDT -----  Regarding: advice  Type:  Needs Medical Advice    Who Called: pt's daughter Barbie  Symptoms (please be specific): rash under both arm  very itchy  How long has patient had these symptoms:  last week after hospital stay  Pharmacy name and phone #:  [facility] [place]  Would the patient rather a call back or a response via [facility]? C/b  Best Call Back Number: [phone]  Additional Information: looking for medication to stop the itching

## 2023-05-30 NOTE — TELEPHONE ENCOUNTER
----- Message from Emil Sexton MA sent at 5/30/2023 11:09 AM CDT -----    ----- Message -----  From: Treasure Shah  Sent: 5/30/2023  10:03 AM CDT  To: [facility] Staff    .Type:  Needs Medical Advice    Who Called: pt daughter  Symptoms (please be specific):    How long has patient had these symptoms:    Pharmacy name and phone #:    Would the patient rather a call back or a response via [facility]?   Best Call Back Number: [phone]  Additional Information: pt wants to change her mom appt she stated she was offered another date by office

## 2023-06-06 ENCOUNTER — OFFICE VISIT (OUTPATIENT)
Dept: INTERNAL MEDICINE | Facility: CLINIC | Age: 78
End: 2023-06-06
Payer: MEDICARE

## 2023-06-06 VITALS
RESPIRATION RATE: 16 BRPM | BODY MASS INDEX: 26.31 KG/M2 | TEMPERATURE: 98 F | HEIGHT: 60 IN | HEART RATE: 65 BPM | WEIGHT: 134 LBS | DIASTOLIC BLOOD PRESSURE: 88 MMHG | OXYGEN SATURATION: 98 % | SYSTOLIC BLOOD PRESSURE: 136 MMHG

## 2023-06-06 DIAGNOSIS — G30.9 ALZHEIMER DISEASE: Chronic | ICD-10-CM

## 2023-06-06 DIAGNOSIS — N18.32 STAGE 3B CHRONIC KIDNEY DISEASE: Primary | ICD-10-CM

## 2023-06-06 DIAGNOSIS — Z09 HOSPITAL DISCHARGE FOLLOW-UP: ICD-10-CM

## 2023-06-06 DIAGNOSIS — G44.209 TENSION HEADACHE: ICD-10-CM

## 2023-06-06 DIAGNOSIS — F02.80 ALZHEIMER DISEASE: Chronic | ICD-10-CM

## 2023-06-06 PROCEDURE — 1101F PR PT FALLS ASSESS DOC 0-1 FALLS W/OUT INJ PAST YR: ICD-10-PCS | Mod: CPTII,,, | Performed by: INTERNAL MEDICINE

## 2023-06-06 PROCEDURE — 1101F PT FALLS ASSESS-DOCD LE1/YR: CPT | Mod: CPTII,,, | Performed by: INTERNAL MEDICINE

## 2023-06-06 PROCEDURE — 99214 OFFICE O/P EST MOD 30 MIN: CPT | Mod: ,,, | Performed by: INTERNAL MEDICINE

## 2023-06-06 PROCEDURE — 1159F PR MEDICATION LIST DOCUMENTED IN MEDICAL RECORD: ICD-10-PCS | Mod: CPTII,,, | Performed by: INTERNAL MEDICINE

## 2023-06-06 PROCEDURE — 3079F PR MOST RECENT DIASTOLIC BLOOD PRESSURE 80-89 MM HG: ICD-10-PCS | Mod: CPTII,,, | Performed by: INTERNAL MEDICINE

## 2023-06-06 PROCEDURE — 3288F PR FALLS RISK ASSESSMENT DOCUMENTED: ICD-10-PCS | Mod: CPTII,,, | Performed by: INTERNAL MEDICINE

## 2023-06-06 PROCEDURE — 3079F DIAST BP 80-89 MM HG: CPT | Mod: CPTII,,, | Performed by: INTERNAL MEDICINE

## 2023-06-06 PROCEDURE — 3075F SYST BP GE 130 - 139MM HG: CPT | Mod: CPTII,,, | Performed by: INTERNAL MEDICINE

## 2023-06-06 PROCEDURE — 1160F RVW MEDS BY RX/DR IN RCRD: CPT | Mod: CPTII,,, | Performed by: INTERNAL MEDICINE

## 2023-06-06 PROCEDURE — 3075F PR MOST RECENT SYSTOLIC BLOOD PRESS GE 130-139MM HG: ICD-10-PCS | Mod: CPTII,,, | Performed by: INTERNAL MEDICINE

## 2023-06-06 PROCEDURE — 99214 PR OFFICE/OUTPT VISIT, EST, LEVL IV, 30-39 MIN: ICD-10-PCS | Mod: ,,, | Performed by: INTERNAL MEDICINE

## 2023-06-06 PROCEDURE — 1159F MED LIST DOCD IN RCRD: CPT | Mod: CPTII,,, | Performed by: INTERNAL MEDICINE

## 2023-06-06 PROCEDURE — 1160F PR REVIEW ALL MEDS BY PRESCRIBER/CLIN PHARMACIST DOCUMENTED: ICD-10-PCS | Mod: CPTII,,, | Performed by: INTERNAL MEDICINE

## 2023-06-06 PROCEDURE — 3288F FALL RISK ASSESSMENT DOCD: CPT | Mod: CPTII,,, | Performed by: INTERNAL MEDICINE

## 2023-06-06 RX ORDER — METHOCARBAMOL 500 MG/1
500 TABLET, FILM COATED ORAL 2 TIMES DAILY PRN
Qty: 30 TABLET | Refills: 0 | Status: SHIPPED | OUTPATIENT
Start: 2023-06-06 | End: 2023-06-21

## 2023-06-06 NOTE — PROGRESS NOTES
Subjective:      Patient ID: Grace Allen is a 78 y.o. female.    Chief Complaint: Follow-up (Hospital F/U from [facility])      HPI:    Patient Name: Grace Allen  MRN: [id number]  [facility]: [id number]  Patient Class: OP- Observation  Admission Date: 5/17/2023  Hospital Length of Stay: 2 days  Discharge Date and Time:  05/22/2023 8:37 AM  Attending Physician: Dewey Cazares, *   Discharging Provider: Dewey Cazares MD  Primary Care Provider: Dewey Cazares MD     Primary Care Team:Dr. Fernandez     HPI/ Hospital Course:   77-year-old -American female here admitted for cognitive impairment difficulty with word finding that started on 05/17/2023 she has a history of Alzheimer's dementia.  She is followed by Leobardo Parsons for her dementia.  She is a very pleasant patient and she lives with her daughter her neighbor Grace is here at the bedside today and states that symptoms started yesterday she noted at a home owners association meeting that Ms. Edwards was more confused than her usual and had trouble getting her words out.  She is followed by Dr. Padilla for Cards     She is lethargic this morning.  She is arousable and does answer questions but you can tell that she has trouble with finding the words.  Initial workup in the ED is negative, CT of the head is negative.  We ordered MRI/MRA of the brain with no acute focal findings or evidence of acute CVA.     Echocardiogram reviewed and she has got some hypokinesis noted to the septum EF at 40-50%; she will need outpatient follow-up with her cardiologist - she denies any chest pain at current but will need outpatient stress testing if not performed in the recent past.  She is awake this morning of is unoriented which is her baseline.  I discussed the case with the relative that was at the bedside and also with her daughter.  At the time of discharge patient questions have been answered, medications reconciled there are no new prescriptions.  There has  been some dose adjustments to some of her medications.  Unfortunately it seems as though she is suffering from Alzheimer's disease progression and will require further symptom management as deemed necessary.  No further recommendations at this time.  Follow-up in the office in 1 week      Todays information:   Goes to the  at 530 and then she gets home at 430; they feed her breakfast and lunch.   She is sleeping well  She reports that her urine was strong, now she is drinking plenty of water  She is cold    Past Medical History:  Past Medical History:   Diagnosis Date    Alzheimer's dementia 8/19/2022    CAD (coronary artery disease)     Dyslipidemia     Hypertension      Past Surgical History:   Procedure Laterality Date    CHOLECYSTECTOMY       Review of patient's allergies indicates:   Allergen Reactions    Adhesive tape-silicones      Current Outpatient Medications on File Prior to Visit   Medication Sig Dispense Refill    aspirin 81 MG Chew Take 81 mg by mouth once daily at 6am.      atorvastatin (LIPITOR) 20 MG tablet Take 1 tablet (20 mg total) by mouth every evening. 90 tablet 3    cetirizine (ZYRTEC) 10 MG tablet Take 10 mg by mouth once daily.      clotrimazole-betamethasone 1-0.05% (LOTRISONE) cream APPLY TOPICALLY TWICE A DAY 15 g 0    docusate sodium (COLACE) 50 MG capsule Take 1 capsule (50 mg total) by mouth 2 (two) times daily as needed for Constipation.  0    donepeziL (ARICEPT) 10 MG tablet Take 1 tablet (10 mg total) by mouth every evening. 30 tablet 5    fluticasone propionate (FLONASE) 50 mcg/actuation nasal spray 1 spray (50 mcg total) by Each Nostril route once daily. 16 g 5    memantine (NAMENDA) 5 MG Tab Take 1 tablet (5 mg total) by mouth 2 (two) times daily. 60 tablet 11    metoprolol succinate (TOPROL-XL) 25 MG 24 hr tablet Take 12.5 mg by mouth once daily.      pantoprazole (PROTONIX) 40 MG tablet Take 40 mg by mouth once daily at 6am.      polyethylene glycol (GLYCOLAX) 17 gram  PwPk Take 17 g by mouth 2 (two) times daily as needed (constipation).  0    QUEtiapine (SEROQUEL) 25 MG Tab Take 1 tablet (25 mg total) by mouth once daily. 30 tablet 11    sertraline (ZOLOFT) 50 MG tablet Take 1 tablet (50 mg total) by mouth once daily. 30 tablet 11    vitamin D (VITAMIN D3) 1000 units Tab Take 5,000 Units by mouth.       No current facility-administered medications on file prior to visit.     Social History     Socioeconomic History    Marital status:    Tobacco Use    Smoking status: Never    Smokeless tobacco: Never   Substance and Sexual Activity    Alcohol use: Not Currently    Drug use: Never    Sexual activity: Not Currently     Family History   Problem Relation Age of Onset    Diabetes Mother     Lung cancer Father        Review of Systems  A comprehensive review of systems was performed and was negative with exception of what is documented above.     Objective:   /88 (BP Location: Right arm, Patient Position: Sitting, BP Method: Medium (Manual))   Pulse 65   Temp 97.7 °F (36.5 °C) (Temporal)   Resp 16   Ht 5' (1.524 m)   Wt 60.8 kg (134 lb)   SpO2 98%   BMI 26.17 kg/m²   Physical Exam  General : Alert and oriented, No acute distress, afebrile.  Eye : PERRLA. EOMI. Normal conjunctiva, Sclerae are nonicteric.  Respiratory : Respirations are non-labored and clear to auscultation bilaterally. Symmetrical air entry bilaterally, no crackles, no wheezes, no rhonchi. No cyanosis, no clubbing.  Cardiovascular : Normal rate, Regular rhythm. No murmurs, rubs, or gallops. Pulses are 2+ throughout. No JVD. No Edema.  Gastrointestinal : Soft, nontender, non-distended, bowel sounds are present in all quadrants, no organomegaly, no guarding, no rebound.  Musculoskeletal : Normal range of motion throughout. No muscle tenderness.  Integumentary : Warm, moist, intact.  Neurologic : Alert, Oriented  Psychiatric : Cooperative, Appropriate mood & affect.   Assessment/ Plan:   1. Stage 3b  chronic kidney disease  Assessment & Plan:  Avoidance of nonsteroidal anti-inflammatory agents, continue adequate amounts of free water intake  Recheck some labs before her next visit    Orders:  -     CBC Auto Differential; Future; Expected date: 12/06/2023  -     Comprehensive Metabolic Panel; Future; Expected date: 12/06/2023  -     Urinalysis; Future; Expected date: 12/06/2023    2. Tension headache  Assessment & Plan:  Advised on massage, Rx Robaxin given      3. Hospital discharge follow-up  Assessment & Plan:  Post hospital course progressing as expected she is enrolled in a adult  which she is going to daily and she seems to be really enjoying that.  Medications reconciled  No new conditions unfortunately progression of Alzheimer's      4. Alzheimer disease  Assessment & Plan:  Recent MRI without any new focal findings.  Patient with disease progression.  Happy to hear that she is doing well at the adult       Other orders  -     methocarbamoL (ROBAXIN) 500 MG Tab; Take 1 tablet (500 mg total) by mouth 2 (two) times daily as needed (muscle pain).  Dispense: 30 tablet; Refill: 0             Follow up in about 3 months (around 9/6/2023) for with labs prior to visit.

## 2023-06-06 NOTE — ASSESSMENT & PLAN NOTE
Post hospital course progressing as expected she is enrolled in a adult  which she is going to daily and she seems to be really enjoying that.  Medications reconciled  No new conditions unfortunately progression of Alzheimer's

## 2023-06-06 NOTE — ASSESSMENT & PLAN NOTE
Recent MRI without any new focal findings.  Patient with disease progression.  Happy to hear that she is doing well at the adult

## 2023-06-21 DIAGNOSIS — M54.9 BACK PAIN, UNSPECIFIED BACK LOCATION, UNSPECIFIED BACK PAIN LATERALITY, UNSPECIFIED CHRONICITY: Primary | ICD-10-CM

## 2023-06-21 RX ORDER — METHOCARBAMOL 500 MG/1
TABLET, FILM COATED ORAL
Qty: 30 TABLET | Refills: 0 | Status: SHIPPED | OUTPATIENT
Start: 2023-06-21 | End: 2023-07-10

## 2023-07-05 ENCOUNTER — TELEPHONE (OUTPATIENT)
Dept: INTERNAL MEDICINE | Facility: CLINIC | Age: 78
End: 2023-07-05
Payer: MEDICARE

## 2023-07-05 DIAGNOSIS — E78.5 DYSLIPIDEMIA: ICD-10-CM

## 2023-07-05 DIAGNOSIS — I10 HYPERTENSION, UNSPECIFIED TYPE: Primary | ICD-10-CM

## 2023-07-05 DIAGNOSIS — N18.32 STAGE 3B CHRONIC KIDNEY DISEASE: ICD-10-CM

## 2023-07-05 DIAGNOSIS — E55.9 VITAMIN D DEFICIENCY: ICD-10-CM

## 2023-07-05 NOTE — TELEPHONE ENCOUNTER
----- Message from Emil Sexton MA sent at 7/5/2023 10:27 AM CDT -----  Regarding: PV 7/12/23 @ 2:00 ERYN Ortega  1. Are there any outstanding tasks in the patient's chart? Yes, fasting labs    2. Is there any documentation in the chart? No    3.Has patient been seen in an ER, Urgent care clinic, or been admitted since last visit?  If yes, When, where, and why    4. Has patient seen any other healthcare providers since last visit?  If yes, when, where, and why    5. Has patient had any bloodwork or XR done since last visit?    6. Is patient signed up for patient portal?

## 2023-07-08 DIAGNOSIS — M54.9 BACK PAIN, UNSPECIFIED BACK LOCATION, UNSPECIFIED BACK PAIN LATERALITY, UNSPECIFIED CHRONICITY: ICD-10-CM

## 2023-07-10 RX ORDER — METHOCARBAMOL 500 MG/1
TABLET, FILM COATED ORAL
Qty: 30 TABLET | Refills: 0 | Status: SHIPPED | OUTPATIENT
Start: 2023-07-10 | End: 2023-08-08

## 2023-08-01 ENCOUNTER — TELEPHONE (OUTPATIENT)
Dept: INTERNAL MEDICINE | Facility: CLINIC | Age: 78
End: 2023-08-01
Payer: MEDICARE

## 2023-08-01 NOTE — TELEPHONE ENCOUNTER
----- Message from Emil Sexton MA sent at 8/1/2023  8:30 AM CDT -----  Regarding: PV 8/8/23 @ 3:20 Dr. Fernandez  1. Are there any outstanding tasks in the patient's chart? Yes, fasting labs    2. Is there any documentation in the chart? No    3.Has patient been seen in an ER, Urgent care clinic, or been admitted since last visit?  If yes, When, where, and why    4. Has patient seen any other healthcare providers since last visit?  If yes, when, where, and why    5. Has patient had any bloodwork or XR done since last visit?    6. Is patient signed up for patient portal?

## 2023-08-02 ENCOUNTER — LAB VISIT (OUTPATIENT)
Dept: LAB | Facility: HOSPITAL | Age: 78
End: 2023-08-02
Attending: INTERNAL MEDICINE
Payer: MEDICARE

## 2023-08-02 DIAGNOSIS — N18.32 STAGE 3B CHRONIC KIDNEY DISEASE: ICD-10-CM

## 2023-08-02 DIAGNOSIS — E55.9 VITAMIN D DEFICIENCY: ICD-10-CM

## 2023-08-02 DIAGNOSIS — I10 HYPERTENSION, UNSPECIFIED TYPE: ICD-10-CM

## 2023-08-02 DIAGNOSIS — E78.5 DYSLIPIDEMIA: ICD-10-CM

## 2023-08-02 LAB
ALBUMIN SERPL-MCNC: 3.9 G/DL (ref 3.4–4.8)
ALBUMIN/GLOB SERPL: 1.5 RATIO (ref 1.1–2)
ALP SERPL-CCNC: 64 UNIT/L (ref 40–150)
ALT SERPL-CCNC: 40 UNIT/L (ref 0–55)
APPEARANCE UR: CLEAR
AST SERPL-CCNC: 40 UNIT/L (ref 5–34)
BACTERIA #/AREA URNS AUTO: NORMAL /HPF
BASOPHILS # BLD AUTO: 0.04 X10(3)/MCL
BASOPHILS NFR BLD AUTO: 0.8 %
BILIRUB UR QL STRIP.AUTO: NEGATIVE
BILIRUBIN DIRECT+TOT PNL SERPL-MCNC: 0.3 MG/DL
BUN SERPL-MCNC: 17.8 MG/DL (ref 9.8–20.1)
CALCIUM SERPL-MCNC: 9.7 MG/DL (ref 8.4–10.2)
CHLORIDE SERPL-SCNC: 109 MMOL/L (ref 98–107)
CHOLEST SERPL-MCNC: 130 MG/DL
CHOLEST/HDLC SERPL: 3 {RATIO} (ref 0–5)
CO2 SERPL-SCNC: 28 MMOL/L (ref 23–31)
COLOR UR: YELLOW
CREAT SERPL-MCNC: 1.43 MG/DL (ref 0.55–1.02)
DEPRECATED CALCIDIOL+CALCIFEROL SERPL-MC: 62.3 NG/ML (ref 30–80)
EOSINOPHIL # BLD AUTO: 0.45 X10(3)/MCL (ref 0–0.9)
EOSINOPHIL NFR BLD AUTO: 8.9 %
ERYTHROCYTE [DISTWIDTH] IN BLOOD BY AUTOMATED COUNT: 14.5 % (ref 11.5–17)
GFR SERPLBLD CREATININE-BSD FMLA CKD-EPI: 37 MLS/MIN/1.73/M2
GLOBULIN SER-MCNC: 2.6 GM/DL (ref 2.4–3.5)
GLUCOSE SERPL-MCNC: 90 MG/DL (ref 82–115)
GLUCOSE UR QL STRIP.AUTO: NEGATIVE
HCT VFR BLD AUTO: 34 % (ref 37–47)
HDLC SERPL-MCNC: 50 MG/DL (ref 35–60)
HGB BLD-MCNC: 10.8 G/DL (ref 12–16)
IMM GRANULOCYTES # BLD AUTO: 0.01 X10(3)/MCL (ref 0–0.04)
IMM GRANULOCYTES NFR BLD AUTO: 0.2 %
KETONES UR QL STRIP.AUTO: NEGATIVE
LDLC SERPL CALC-MCNC: 67 MG/DL (ref 50–140)
LEUKOCYTE ESTERASE UR QL STRIP.AUTO: ABNORMAL
LYMPHOCYTES # BLD AUTO: 1.83 X10(3)/MCL (ref 0.6–4.6)
LYMPHOCYTES NFR BLD AUTO: 36 %
MCH RBC QN AUTO: 27.2 PG (ref 27–31)
MCHC RBC AUTO-ENTMCNC: 31.8 G/DL (ref 33–36)
MCV RBC AUTO: 85.6 FL (ref 80–94)
MONOCYTES # BLD AUTO: 0.45 X10(3)/MCL (ref 0.1–1.3)
MONOCYTES NFR BLD AUTO: 8.9 %
NEUTROPHILS # BLD AUTO: 2.3 X10(3)/MCL (ref 2.1–9.2)
NEUTROPHILS NFR BLD AUTO: 45.2 %
NITRITE UR QL STRIP.AUTO: NEGATIVE
NRBC BLD AUTO-RTO: 0 %
PH UR STRIP.AUTO: 5.5 [PH]
PLATELET # BLD AUTO: 131 X10(3)/MCL (ref 130–400)
PMV BLD AUTO: 10.8 FL (ref 7.4–10.4)
POTASSIUM SERPL-SCNC: 4.4 MMOL/L (ref 3.5–5.1)
PROT SERPL-MCNC: 6.5 GM/DL (ref 5.8–7.6)
PROT UR QL STRIP.AUTO: NEGATIVE
RBC # BLD AUTO: 3.97 X10(6)/MCL (ref 4.2–5.4)
RBC #/AREA URNS AUTO: NORMAL /HPF
RBC UR QL AUTO: NEGATIVE
SODIUM SERPL-SCNC: 143 MMOL/L (ref 136–145)
SP GR UR STRIP.AUTO: 1.01 (ref 1–1.03)
SQUAMOUS #/AREA URNS AUTO: NORMAL /HPF
TRIGL SERPL-MCNC: 64 MG/DL (ref 37–140)
TSH SERPL-ACNC: 6.26 UIU/ML (ref 0.35–4.94)
UROBILINOGEN UR STRIP-ACNC: 0.2
VLDLC SERPL CALC-MCNC: 13 MG/DL
WBC # SPEC AUTO: 5.08 X10(3)/MCL (ref 4.5–11.5)
WBC #/AREA URNS AUTO: NORMAL /HPF

## 2023-08-02 PROCEDURE — 80061 LIPID PANEL: CPT

## 2023-08-02 PROCEDURE — 81001 URINALYSIS AUTO W/SCOPE: CPT

## 2023-08-02 PROCEDURE — 36415 COLL VENOUS BLD VENIPUNCTURE: CPT

## 2023-08-02 PROCEDURE — 85025 COMPLETE CBC W/AUTO DIFF WBC: CPT

## 2023-08-02 PROCEDURE — 84443 ASSAY THYROID STIM HORMONE: CPT

## 2023-08-02 PROCEDURE — 82306 VITAMIN D 25 HYDROXY: CPT

## 2023-08-02 PROCEDURE — 80053 COMPREHEN METABOLIC PANEL: CPT

## 2023-08-08 ENCOUNTER — OFFICE VISIT (OUTPATIENT)
Dept: INTERNAL MEDICINE | Facility: CLINIC | Age: 78
End: 2023-08-08
Payer: MEDICARE

## 2023-08-08 VITALS
HEIGHT: 60 IN | HEART RATE: 65 BPM | OXYGEN SATURATION: 95 % | DIASTOLIC BLOOD PRESSURE: 82 MMHG | SYSTOLIC BLOOD PRESSURE: 134 MMHG | RESPIRATION RATE: 16 BRPM | TEMPERATURE: 98 F | BODY MASS INDEX: 25.72 KG/M2 | WEIGHT: 131 LBS

## 2023-08-08 DIAGNOSIS — G30.9 ALZHEIMER DISEASE: Chronic | ICD-10-CM

## 2023-08-08 DIAGNOSIS — I10 PRIMARY HYPERTENSION: ICD-10-CM

## 2023-08-08 DIAGNOSIS — E53.8 FOLATE DEFICIENCY: ICD-10-CM

## 2023-08-08 DIAGNOSIS — R29.6 FALLS FREQUENTLY: ICD-10-CM

## 2023-08-08 DIAGNOSIS — I25.10 ARTERIOSCLEROSIS OF CORONARY ARTERY: ICD-10-CM

## 2023-08-08 DIAGNOSIS — N18.32 STAGE 3B CHRONIC KIDNEY DISEASE: ICD-10-CM

## 2023-08-08 DIAGNOSIS — F02.80 ALZHEIMER DISEASE: Chronic | ICD-10-CM

## 2023-08-08 DIAGNOSIS — E78.5 DYSLIPIDEMIA: ICD-10-CM

## 2023-08-08 DIAGNOSIS — N95.2 VAGINAL ATROPHY: ICD-10-CM

## 2023-08-08 DIAGNOSIS — Z00.00 MEDICARE ANNUAL WELLNESS VISIT, SUBSEQUENT: Primary | ICD-10-CM

## 2023-08-08 DIAGNOSIS — D50.8 IRON DEFICIENCY ANEMIA SECONDARY TO INADEQUATE DIETARY IRON INTAKE: ICD-10-CM

## 2023-08-08 PROBLEM — F03.90 DEMENTIA WITHOUT BEHAVIORAL DISTURBANCE: Status: RESOLVED | Noted: 2022-08-19 | Resolved: 2023-08-08

## 2023-08-08 PROBLEM — G44.209 TENSION HEADACHE: Status: RESOLVED | Noted: 2023-06-06 | Resolved: 2023-08-08

## 2023-08-08 PROBLEM — Z09 HOSPITAL DISCHARGE FOLLOW-UP: Status: RESOLVED | Noted: 2023-06-06 | Resolved: 2023-08-08

## 2023-08-08 PROBLEM — D50.9 IRON DEFICIENCY ANEMIA: Status: ACTIVE | Noted: 2023-08-08

## 2023-08-08 PROBLEM — R41.82 AMS (ALTERED MENTAL STATUS): Status: RESOLVED | Noted: 2023-05-18 | Resolved: 2023-08-08

## 2023-08-08 PROBLEM — R41.89 COGNITIVE IMPAIRMENT: Status: RESOLVED | Noted: 2023-05-19 | Resolved: 2023-08-08

## 2023-08-08 PROCEDURE — 1160F RVW MEDS BY RX/DR IN RCRD: CPT | Mod: CPTII,,, | Performed by: INTERNAL MEDICINE

## 2023-08-08 PROCEDURE — 3079F DIAST BP 80-89 MM HG: CPT | Mod: CPTII,,, | Performed by: INTERNAL MEDICINE

## 2023-08-08 PROCEDURE — 99213 OFFICE O/P EST LOW 20 MIN: CPT | Mod: 25,,, | Performed by: INTERNAL MEDICINE

## 2023-08-08 PROCEDURE — 3288F PR FALLS RISK ASSESSMENT DOCUMENTED: ICD-10-PCS | Mod: CPTII,,, | Performed by: INTERNAL MEDICINE

## 2023-08-08 PROCEDURE — 1159F PR MEDICATION LIST DOCUMENTED IN MEDICAL RECORD: ICD-10-PCS | Mod: CPTII,,, | Performed by: INTERNAL MEDICINE

## 2023-08-08 PROCEDURE — 3288F FALL RISK ASSESSMENT DOCD: CPT | Mod: CPTII,,, | Performed by: INTERNAL MEDICINE

## 2023-08-08 PROCEDURE — G0439 PR MEDICARE ANNUAL WELLNESS SUBSEQUENT VISIT: ICD-10-PCS | Mod: ,,, | Performed by: INTERNAL MEDICINE

## 2023-08-08 PROCEDURE — G0439 PPPS, SUBSEQ VISIT: HCPCS | Mod: ,,, | Performed by: INTERNAL MEDICINE

## 2023-08-08 PROCEDURE — 3075F SYST BP GE 130 - 139MM HG: CPT | Mod: CPTII,,, | Performed by: INTERNAL MEDICINE

## 2023-08-08 PROCEDURE — 1101F PR PT FALLS ASSESS DOC 0-1 FALLS W/OUT INJ PAST YR: ICD-10-PCS | Mod: CPTII,,, | Performed by: INTERNAL MEDICINE

## 2023-08-08 PROCEDURE — 99213 PR OFFICE/OUTPT VISIT, EST, LEVL III, 20-29 MIN: ICD-10-PCS | Mod: 25,,, | Performed by: INTERNAL MEDICINE

## 2023-08-08 PROCEDURE — 3075F PR MOST RECENT SYSTOLIC BLOOD PRESS GE 130-139MM HG: ICD-10-PCS | Mod: CPTII,,, | Performed by: INTERNAL MEDICINE

## 2023-08-08 PROCEDURE — 1159F MED LIST DOCD IN RCRD: CPT | Mod: CPTII,,, | Performed by: INTERNAL MEDICINE

## 2023-08-08 PROCEDURE — 3079F PR MOST RECENT DIASTOLIC BLOOD PRESSURE 80-89 MM HG: ICD-10-PCS | Mod: CPTII,,, | Performed by: INTERNAL MEDICINE

## 2023-08-08 PROCEDURE — 1160F PR REVIEW ALL MEDS BY PRESCRIBER/CLIN PHARMACIST DOCUMENTED: ICD-10-PCS | Mod: CPTII,,, | Performed by: INTERNAL MEDICINE

## 2023-08-08 PROCEDURE — 1101F PT FALLS ASSESS-DOCD LE1/YR: CPT | Mod: CPTII,,, | Performed by: INTERNAL MEDICINE

## 2023-08-08 RX ORDER — FERROUS GLUCONATE 324(38)MG
324 TABLET ORAL
Qty: 90 TABLET | Refills: 3 | Status: SHIPPED | OUTPATIENT
Start: 2023-08-08

## 2023-08-08 RX ORDER — QUETIAPINE FUMARATE 25 MG/1
25 TABLET, FILM COATED ORAL 2 TIMES DAILY
Qty: 60 TABLET | Refills: 11
Start: 2023-08-08 | End: 2023-12-26

## 2023-08-08 RX ORDER — CLOTRIMAZOLE AND BETAMETHASONE DIPROPIONATE 10; .64 MG/G; MG/G
CREAM TOPICAL 2 TIMES DAILY
Qty: 45 G | Refills: 1 | Status: SHIPPED | OUTPATIENT
Start: 2023-08-08 | End: 2023-10-27

## 2023-08-08 NOTE — ASSESSMENT & PLAN NOTE
General health maintenance education given, labs reviewed advise her to start little iron supplementation Rx sent to pharmacy.  She can can you prenatal multivitamin, B complex, vitamin-D  Advised on RSV an updated COVID vaccine in the fall in addition to her flu vaccine  Remainder of age-appropriate exams are up-to-date  I did disclose to the daughter that I think it was nearing time for her to need potentially some memory unit placement patient waking up at 4:00 a.m. taking Seroquel at 8:00 p.m. getting agitated maybe because she is not getting enough rest and her daughter works a full-time job.

## 2023-08-08 NOTE — ASSESSMENT & PLAN NOTE
Lab Results   Component Value Date    LDL 67.00 08/02/2023    TRIG 64 08/02/2023    HDL 50 08/02/2023    TOTALCHOLEST 3 08/02/2023     Continue current med management  Stressed importance of dietary modifications. Follow a low cholesterol, low saturated fat diet with less that 200mg of cholesterol a day.  Avoid fried foods and high saturated fats (high saturated fats less than 7% of calories).  Add Flax Seed/Fish Oil supplements to diet. Increase dietary fiber.  Regular exercise can reduce LDL and raise HDL. Stressed importance of physical activity 5 times per week for 30 minutes per day.

## 2023-08-08 NOTE — ASSESSMENT & PLAN NOTE
Topical Rx for clotrimazole/betamethasone sent to pharmacy if no improvement we may do a little bit of clindamycin.  Advised on probiotic

## 2023-08-08 NOTE — ASSESSMENT & PLAN NOTE
Well controlled.   Continue current med management  Low Sodium Diet (DASH Diet - Less than 2 grams of sodium per day).  Monitor blood pressure daily and log. Report consistent numbers greater than 140/90.  Maintain healthy weight with goal BMI <30. Exercise 30 minutes per day, 5 days per week.  Smoking cessation encouraged to aid in BP reduction.

## 2023-08-08 NOTE — PROGRESS NOTES
Patient ID: [id number]     Chief Complaint: Medicare AWV      HPI:     Grace Allen is a 78 y.o. female here today for a Medicare Wellness.   Valerie for Cards  Patient with Dementia new patient to us in January 2021; and we have not seen her since.   Seeing neurologist in [place] for cognitive decline; going see Issa in December  Needs ears cleaned   Patient having medication compliance issues  Was then reporting some nausea and dry heaving.  Has been   Trazodone caused nightmares      Problem 1:   Burning in her vagina    Problem 2:   Cold all the time    Problem 3:   Agitated   Wakes up at 4 am   Not taking her Seroquel till 8am  Sleep till 11am on the weekends       Opioid Screening: Patient medication list reviewed, patient is not taking prescription opioids. Patient is not using additional opioids than prescribed. Patient is at low risk of substance abuse based on this opioid use history.       ----------------------------  Alzheimer's dementia  CAD (coronary artery disease)  Dyslipidemia  Hypertension     Past Surgical History:   Procedure Laterality Date    CHOLECYSTECTOMY         Review of patient's allergies indicates:   Allergen Reactions    Adhesive tape-silicones        Outpatient Medications Marked as Taking for the 8/8/23 encounter (Office Visit) with Dewey Cazares MD   Medication Sig Dispense Refill    aspirin 81 MG Chew Take 81 mg by mouth once daily at 6am.      atorvastatin (LIPITOR) 20 MG tablet Take 1 tablet (20 mg total) by mouth every evening. 90 tablet 3    cetirizine (ZYRTEC) 10 MG tablet Take 10 mg by mouth once daily.      docusate sodium (COLACE) 50 MG capsule Take 1 capsule (50 mg total) by mouth 2 (two) times daily as needed for Constipation.  0    fluticasone propionate (FLONASE) 50 mcg/actuation nasal spray 1 spray (50 mcg total) by Each Nostril route once daily. 16 g 5    memantine (NAMENDA) 5 MG Tab Take 1 tablet (5 mg total) by mouth 2 (two) times daily. 60  tablet 11    metoprolol succinate (TOPROL-XL) 25 MG 24 hr tablet Take 12.5 mg by mouth once daily.      pantoprazole (PROTONIX) 40 MG tablet Take 40 mg by mouth once daily at 6am.      polyethylene glycol (GLYCOLAX) 17 gram PwPk Take 17 g by mouth 2 (two) times daily as needed (constipation).  0    sertraline (ZOLOFT) 50 MG tablet Take 1 tablet (50 mg total) by mouth once daily. 30 tablet 11    vitamin D (VITAMIN D3) 1000 units Tab Take 5,000 Units by mouth.      [DISCONTINUED] clotrimazole-betamethasone 1-0.05% (LOTRISONE) cream APPLY TOPICALLY TWICE A DAY 15 g 0    [DISCONTINUED] donepeziL (ARICEPT) 10 MG tablet Take 1 tablet (10 mg total) by mouth every evening. 30 tablet 5    [DISCONTINUED] methocarbamoL (ROBAXIN) 500 MG Tab TAKE 1 TABLET BY MOUTH TWICE A DAY AS NEEDED FOR MUSCLE PAIN 30 tablet 0    [DISCONTINUED] QUEtiapine (SEROQUEL) 25 MG Tab Take 1 tablet (25 mg total) by mouth once daily. 30 tablet 11       Social History     Socioeconomic History    Marital status:    Tobacco Use    Smoking status: Never    Smokeless tobacco: Never   Substance and Sexual Activity    Alcohol use: Not Currently    Drug use: Never    Sexual activity: Not Currently     Social Determinants of Health     Financial Resource Strain: Low Risk  (8/8/2023)    Overall Financial Resource Strain (CARDIA)     Difficulty of Paying Living Expenses: Not hard at all   Food Insecurity: No Food Insecurity (8/8/2023)    Hunger Vital Sign     Worried About Running Out of Food in the Last Year: Never true     Ran Out of Food in the Last Year: Never true   Transportation Needs: No Transportation Needs (8/8/2023)    PRAPARE - Transportation     Lack of Transportation (Medical): No     Lack of Transportation (Non-Medical): No   Physical Activity: Sufficiently Active (8/8/2023)    Exercise Vital Sign     Days of Exercise per Week: 5 days     Minutes of Exercise per Session: 60 min   Stress: No Stress Concern Present (8/8/2023)    [employer] - Occupational Stress Questionnaire     Feeling of Stress : Not at all   Social Connections: Moderately Isolated (8/8/2023)    Social Connection and Isolation Panel [NHANES]     Frequency of Communication with Friends and Family: More than three times a week     Frequency of Social Gatherings with Friends and Family: More than three times a week     Attends Sikhism Services: Never     Active Member of Clubs or Organizations: Yes     Attends Club or Organization Meetings: More than 4 times per year     Marital Status:    Housing Stability: Low Risk  (8/8/2023)    Housing Stability Vital Sign     Unable to Pay for Housing in the Last Year: No     Number of Places Lived in the Last Year: 1     Unstable Housing in the Last Year: No        Family History   Problem Relation Age of Onset    Diabetes Mother     Lung cancer Father         Patient Care Team:  Dewey Cazares MD as PCP - General (Internal Medicine)  Isabela Ambriz MD as Consulting Physician (Cardiology)       Subjective:     ROS  Constitutional: No fever, no chills, no night sweats, no changes in weight, no changes in appetite.  Eye: No blurring of vision, no double vision, no conjunctival injection, no acute vision loss  ENMT: No trauma, No sore throat, no rhinorrhea, no tinnitus, no headache, no vertigo, no ear pain or discharge  Respiratory: No cough, no sputum production, no shortness of breath, no hemoptysis, no wheezing.  Cardiovascular: No chest pain, no VENCES, no PND, no orthopnea, no edema, no palpitations.  Gastrointestinal: No abdominal pain, nausea, no vomiting, no changes in frequency or consistency of stools, no diarrhea, no constipation, no BRBPR.  Genitourinary: No dysuria, no hematuria, no foul-smelling urine, no straining to urinate, no increase in frequency  Heme/Lymph: No easy bruising/ bleeding, no swollen or painful glands.  Endocrine: No polyuria, no polydipsia, no polyphagia, no heat or  cold intolerance.  Musculoskeletal: No muscle pain, no muscle weakness, no joint pain, no difficulties on reference to range of motion.  Integumentary: No rash, no pruritis.  Neurologic: No sensory deficit, no motor deficit, no headache, no neck rigidity, no paresthesias, no syncope.  Psychiatric: + mood changes, no anxiety, no depression, + tension, + memory defecits  All Other ROS: Negative with exception of what is documented in the history of present illness     Patient Reported Health Risk Assessment  What [age]  Are you male or female?: Female  During the past four weeks, how much have you been bothered by emotional problems such as feeling anxious, depressed, irritable, sad, or downhearted and blue?: Not at all  During the past five weeks, has your physical and/or emotional health limited your social activities with family, friends, neighbors, or groups?: Not at all  During the past four weeks, how much bodily pain have you generally had?: Very mild pain  During the past four weeks, was someone available to help if you needed and wanted help?: Yes, as much as I wanted  During the past four weeks, what was the hardest physical activity you could do for at least two minutes?: Moderate  Can you get to places out of walking distance without help?  (For example, can you travel alone on buses or taxis, or drive your own car?): No  Can you go shopping for groceries or clothes without someone's help?: No  Can you prepare your own meals?: No  Can you do your own housework without help?: No  Because of any health problems, do you need the help of another person with your personal care needs such as eating, bathing, dressing, or getting around the house?: Yes  Can you handle your own money without help?: No  During the past four weeks, how would you rate your health in general?: Good  How have things been going for you during the past four weeks?: Good and bad parts about equal  Are you having difficulties  driving your car?: Not applicable, I do not use a car  Do you always fasten your seat belt when you are in a car?: Yes, usually  How often in the past four weeks have you been bothered by falling or dizzy when standing up?: Never  How often in the past four weeks have you been bothered by sexual problems?: Never  How often in the past four weeks have you been bothered by trouble eating well?: Never  How often in the past four weeks have you been bothered by teeth or denture problems?: Never  How often in the past four weeks have you been bothered with problems using the telephone?: Never  How often in the past four weeks have you been bothered by tiredness or fatigue?: Never  Have you fallen two or more times in the past year?: Yes  Are you afraid of falling?: Yes  Are you a smoker?: No  During the past four weeks, how many drinks of wine, beer, or other alcoholic beverages did you have?: One drink or less per week  Do you exercise for about 20 minutes three or more days a week?: Yes, most of the time  Have you been given any information to help you with hazards in your house that might hurt you?: Yes  Have you been given any information to help you with keeping track of your medications?: Yes  How often do you have trouble taking medicines the way you've been told to take them?: I always take them as prescribed  How confident are you that you can control and manage most of your health problems?: Somewhat confident  What is your race? (Check all that apply.):     Objective:     /82 (BP Location: Right arm, Patient Position: Sitting, BP Method: Medium (Manual))   Pulse 65   Temp 97.7 °F (36.5 °C) (Temporal)   Resp 16   Ht 5' (1.524 m)   Wt 59.4 kg (131 lb)   SpO2 95%   BMI 25.58 kg/m²     Physical Exam  General : Alert and oriented, No acute distress, afebrile.  Eye : PERRLA. EOMI. Normal conjunctiva, Sclerae are nonicteric.  Respiratory : Respirations are non-labored and clear to  "auscultation bilaterally. Symmetrical air entry bilaterally, no crackles, no wheezes, no rhonchi. No cyanosis, no clubbing.  Cardiovascular : Normal rate, Regular rhythm. No murmurs, rubs, or gallops. Pulses are 2+ throughout. No JVD. No Edema.  Gastrointestinal : Soft, nontender, non-distended, bowel sounds are present in all quadrants, no organomegaly, no guarding, no rebound.  Musculoskeletal : Normal range of motion throughout. No muscle tenderness.  Integumentary : Warm, moist, intact.  Neurologic : Alert, Oriented X1  Psychiatric : Cooperative, Appropriate mood & affect.     No flowsheet data found.  Fall Risk Assessment - Outpatient 8/8/2023 6/6/2023 1/10/2023 12/28/2022 12/7/2022 11/9/2022 9/19/2022   Mobility Status Ambulatory Ambulatory Ambulatory Ambulatory Ambulatory Ambulatory Ambulatory   Number of falls 0 0 0 2+ 0 1 with injury 1 with injury   Identified as fall risk 0 0 0 1 0 1 1   Wrist band refused - - - - - - 1           Depression Screening  Over the past two weeks, has the patient felt down, depressed, or hopeless?: No  Over the past two weeks, has the patient felt little interest or pleasure in doing things?: No  Functional Ability/Safety Screening  Was the patient's timed Up & Go test unsteady or longer than 30 seconds?: No  Does the patient need help with phone, transportation, shopping, preparing meals, housework, laundry, meds, or managing money?: Yes  Does the patient's home have rugs in the hallway, lack grab bars in the bathroom, lack handrails on the stairs or have poor lighting?: No  Have you noticed any hearing difficulties?: No  A "Yes" response to any of the above questions should trigger further investigation.: Pt has Dementia, and he daughter is currently helping her with transportation  Cognitive Function (Assessed through direct observation with due consideration of information obtained by way of patient reports and/or concerns raised by family, friends, caretakers, or others)  " "  Does the patient repeat questions/statements in the same day?: Yes  Does the patient have trouble remembering the date, year, and time?: Yes  Does the patient have difficulty managing finances?: Yes  Does the patient have a decreased sense of direction?: Yes  A "Yes" response to any of the above questions could indicate mild cognitive impairment and should trigger further investigation.: Pt has dementia and is currently living with her daughter as her caregiver.  Assessment/Plan:     1. Medicare annual wellness visit, subsequent  Assessment & Plan:  General health maintenance education given, labs reviewed advise her to start little iron supplementation Rx sent to pharmacy.  She can can you prenatal multivitamin, B complex, vitamin-D  Advised on RSV an updated COVID vaccine in the fall in addition to her flu vaccine  Remainder of age-appropriate exams are up-to-date  I did disclose to the daughter that I think it was nearing time for her to need potentially some memory unit placement patient waking up at 4:00 a.m. taking Seroquel at 8:00 p.m. getting agitated maybe because she is not getting enough rest and her daughter works a full-time job.      2. Alzheimer disease  Assessment & Plan:  Daughter okay to give her her evening meds including her Seroquel at 4- 5 p.m. with her dinner she can add an extra half tablet of Seroquel  Continue Namenda      3. Primary hypertension  Assessment & Plan:  Well controlled.   Continue current med management  Low Sodium Diet (DASH Diet - Less than 2 grams of sodium per day).  Monitor blood pressure daily and log. Report consistent numbers greater than 140/90.  Maintain healthy weight with goal BMI <30. Exercise 30 minutes per day, 5 days per week.  Smoking cessation encouraged to aid in BP reduction.      4. Dyslipidemia  Assessment & Plan:  Lab Results   Component Value Date    LDL 67.00 08/02/2023    TRIG 64 08/02/2023    HDL 50 08/02/2023    TOTALCHOLEST 3 08/02/2023 "     Continue current med management  Stressed importance of dietary modifications. Follow a low cholesterol, low saturated fat diet with less that 200mg of cholesterol a day.  Avoid fried foods and high saturated fats (high saturated fats less than 7% of calories).  Add Flax Seed/Fish Oil supplements to diet. Increase dietary fiber.  Regular exercise can reduce LDL and raise HDL. Stressed importance of physical activity 5 times per week for 30 minutes per day.       5. Arteriosclerosis of coronary artery    6. Stage 3b chronic kidney disease  Assessment & Plan:  Stable, no NSAIDs      7. Falls frequently    8. Iron deficiency anemia secondary to inadequate dietary iron intake  Assessment & Plan:  Add iron supplementation      9. Folate deficiency  Assessment & Plan:  Continue prenatal MVI with folic acid      10. Vaginal atrophy  Assessment & Plan:  Topical Rx for clotrimazole/betamethasone sent to pharmacy if no improvement we may do a little bit of clindamycin.  Advised on probiotic      Other orders  -     ferrous gluconate (FERGON) 324 MG tablet; Take 1 tablet (324 mg total) by mouth daily with breakfast.  Dispense: 90 tablet; Refill: 3  -     QUEtiapine (SEROQUEL) 25 MG Tab; Take 1 tablet (25 mg total) by mouth 2 (two) times daily. And take extra 1/2 pill at 5pm  Dispense: 60 tablet; Refill: 11  -     clotrimazole-betamethasone 1-0.05% (LOTRISONE) cream; Apply topically 2 (two) times daily. To vagina  Dispense: 45 g; Refill: 1           Medicare Annual Wellness and Personalized Prevention Plan:   Fall Risk + Home Safety + Hearing Impairment + Depression Screen + Opioid and Substance Abuse Screening + Cognitive Impairment Screen + Health Risk Assessment all reviewed.     Health Maintenance Topics with due status: Not Due       Topic Last Completion Date    Influenza Vaccine 08/31/2020    DEXA Scan 02/03/2021    TETANUS VACCINE 12/21/2021    Lipid Panel 08/02/2023    Aspirin/Antiplatelet Therapy 08/08/2023       The patient's Health Maintenance was reviewed and the following appears to be due at this time:   Health Maintenance Due   Topic Date Due    Hepatitis C Screening  Never done    COVID-19 Vaccine (7 - Pfizer series) 05/26/2023       Advance Care Planning   I attest to discussing Advance Care Planning with patient and/or family member.  Education was provided including the importance of the Health Care Power of , Advance Directives, and/or [place] documentation.  The patient expressed understanding to the importance of this information and discussion.         Follow up in about 3 months (around 11/8/2023) for with labs prior to visit. In addition to their scheduled follow up, the patient has also been instructed to follow up on as needed basis.

## 2023-08-08 NOTE — ASSESSMENT & PLAN NOTE
Daughter okay to give her her evening meds including her Seroquel at 4- 5 p.m. with her dinner she can add an extra half tablet of Seroquel  Continue Namenda

## 2023-09-13 ENCOUNTER — TELEPHONE (OUTPATIENT)
Dept: INTERNAL MEDICINE | Facility: CLINIC | Age: 78
End: 2023-09-13
Payer: MEDICARE

## 2023-09-13 NOTE — TELEPHONE ENCOUNTER
Spoke to pt's daughter. Pt's daughter verbally confirmed understanding.  Barbie is going to have to give the Seroquel closer to 5 PM due to this is the time the pt gets home from the  center.

## 2023-09-13 NOTE — TELEPHONE ENCOUNTER
----- Message from Dewey Cazares MD sent at 9/13/2023  2:32 PM CDT -----  Decrease zoloft dosing to 50mg in the am  Seroquel 50mg at 4pm  ----- Message -----  From: Radha Lane  Sent: 9/13/2023   8:23 AM CDT  To: Dewey Cazares MD    Pt's daughter states pt is Fidgety, not relaxing, and thinking everyone is out to get her.    Since apt 8.8.23 until last night daughter was given pt Zoloft 100mg 1 and 1/2 pill daily (Night time).  She just finished the 100mg, and states she will start given the pt 50mg 1 daily.    The Seroquel 25mg daughter is given 1 daily (Night time).    Please Advise

## 2023-09-18 ENCOUNTER — TELEPHONE (OUTPATIENT)
Dept: INTERNAL MEDICINE | Facility: CLINIC | Age: 78
End: 2023-09-18
Payer: MEDICARE

## 2023-09-18 NOTE — TELEPHONE ENCOUNTER
----- Message from Emil Sexton MA sent at 9/18/2023  1:56 PM CDT -----  Pt needs portal for the e-visit consult.   ----- Message -----  From: Dewey Cazares MD  Sent: 9/18/2023  12:22 PM CDT  To: Emil Sexton MA; #    Can we send an e visit request  ----- Message -----  From: Emil Sexton MA  Sent: 9/18/2023  10:58 AM CDT  To: Dewey Cazares MD      ----- Message -----  From: Treasure Shah  Sent: 9/18/2023   8:49 AM CDT  To: Bandar Palomo Staff    .Type:  Needs Medical Advice    Who Called: pt daughter  Symptoms (please be specific): tested positive covid   How long has patient had these symptoms:    Pharmacy name and phone #:  [facility]  Would the patient rather a call back or a response via [facility]?   Best Call Back Number: [phone]  Additional Information: headache over eyes,sinus drip

## 2023-09-18 NOTE — TELEPHONE ENCOUNTER
----- Message from Dewey Cazares MD sent at 9/18/2023  2:10 PM CDT -----  Molnupavir rx sent  ----- Message -----  From: Emil Sexton MA  Sent: 9/18/2023   1:57 PM CDT  To: Dewey Cazares MD    Pt needs portal for the e-visit consult.   ----- Message -----  From: Dewey Cazares MD  Sent: 9/18/2023  12:22 PM CDT  To: Emil Sexton MA; #    Can we send an e visit request  ----- Message -----  From: Emil Sexton MA  Sent: 9/18/2023  10:58 AM CDT  To: Dewey Cazares MD      ----- Message -----  From: Treasure Shah  Sent: 9/18/2023   8:49 AM CDT  To: Bandar Palomo Staff    .Type:  Needs Medical Advice    Who Called: pt daughter  Symptoms (please be specific): tested positive covid   How long has patient had these symptoms:    Pharmacy name and phone #:  [facility]  Would the patient rather a call back or a response via [facility]?   Best Call Back Number: [phone]  Additional Information: headache over eyes,sinus drip

## 2023-10-26 DIAGNOSIS — I10 ESSENTIAL (PRIMARY) HYPERTENSION: ICD-10-CM

## 2023-10-27 RX ORDER — CLOTRIMAZOLE AND BETAMETHASONE DIPROPIONATE 10; .64 MG/G; MG/G
CREAM TOPICAL 2 TIMES DAILY
Qty: 15 G | Refills: 1 | Status: SHIPPED | OUTPATIENT
Start: 2023-10-27

## 2023-10-30 ENCOUNTER — TELEPHONE (OUTPATIENT)
Dept: INTERNAL MEDICINE | Facility: CLINIC | Age: 78
End: 2023-10-30
Payer: MEDICARE

## 2023-10-30 DIAGNOSIS — E53.8 FOLIC ACID DEFICIENCY: ICD-10-CM

## 2023-10-30 DIAGNOSIS — I10 PRIMARY HYPERTENSION: Primary | ICD-10-CM

## 2023-10-30 DIAGNOSIS — E78.5 DYSLIPIDEMIA: ICD-10-CM

## 2023-10-30 DIAGNOSIS — D50.8 IRON DEFICIENCY ANEMIA SECONDARY TO INADEQUATE DIETARY IRON INTAKE: ICD-10-CM

## 2023-10-30 DIAGNOSIS — E53.8 B12 DEFICIENCY: ICD-10-CM

## 2023-10-30 NOTE — TELEPHONE ENCOUNTER
----- Message from Emil Sexton MA sent at 10/30/2023 12:12 PM CDT -----  Regarding: JESUS 11/13/23 @ 3:40 Dr. Fernandez  1. Are there any outstanding tasks in the patient's chart? Yes, fasting labs    2. Is there any documentation in the chart? No    3.Has patient been seen in an ER, Urgent care clinic, or been admitted since last visit?  If yes, When, where, and why    4. Has patient seen any other healthcare providers since last visit?  If yes, when, where, and why    5. Has patient had any bloodwork or XR done since last visit?    6. Is patient signed up for patient portal?

## 2023-11-01 ENCOUNTER — TELEPHONE (OUTPATIENT)
Dept: INTERNAL MEDICINE | Facility: CLINIC | Age: 78
End: 2023-11-01
Payer: MEDICARE

## 2023-11-01 NOTE — TELEPHONE ENCOUNTER
----- Message from [facility] sent at 11/1/2023  3:03 PM CDT -----  .Type:  Needs Medical Advice    Who Called:  Barbie (pt's daughter)    Symptoms (please be specific):  no     How long has patient had these symptoms:  no    Pharmacy name and phone #:   no    Would the patient rather a call back? Yes    Best Call Back Number:  [phone]    Additional Information:  she wants to speak to a nurse before her pt's appt on 11-13-23  She also wants to know her mom's last flu vaccine

## 2023-11-02 ENCOUNTER — TELEPHONE (OUTPATIENT)
Dept: INTERNAL MEDICINE | Facility: CLINIC | Age: 78
End: 2023-11-02
Payer: MEDICARE

## 2023-11-13 PROBLEM — Z00.00 MEDICARE ANNUAL WELLNESS VISIT, SUBSEQUENT: Status: RESOLVED | Noted: 2022-06-28 | Resolved: 2023-11-13

## 2023-11-16 ENCOUNTER — HOSPITAL ENCOUNTER (EMERGENCY)
Facility: HOSPITAL | Age: 78
Discharge: HOME | End: 2023-11-16
Attending: STUDENT IN AN ORGANIZED HEALTH CARE EDUCATION/TRAINING PROGRAM
Payer: MEDICARE

## 2023-11-16 ENCOUNTER — TELEPHONE (OUTPATIENT)
Dept: INTERNAL MEDICINE | Facility: CLINIC | Age: 78
End: 2023-11-16
Payer: MEDICARE

## 2023-11-16 VITALS
OXYGEN SATURATION: 98 % | SYSTOLIC BLOOD PRESSURE: 151 MMHG | TEMPERATURE: 98 F | BODY MASS INDEX: 26.37 KG/M2 | WEIGHT: 135 LBS | HEART RATE: 63 BPM | RESPIRATION RATE: 19 BRPM | DIASTOLIC BLOOD PRESSURE: 92 MMHG

## 2023-11-16 DIAGNOSIS — R41.0 CONFUSION: ICD-10-CM

## 2023-11-16 DIAGNOSIS — N39.0 URINARY TRACT INFECTION WITHOUT HEMATURIA, SITE UNSPECIFIED: Primary | ICD-10-CM

## 2023-11-16 LAB
ALBUMIN SERPL-MCNC: 4.1 G/DL (ref 3.4–4.8)
ALBUMIN/GLOB SERPL: 1.3 RATIO (ref 1.1–2)
ALP SERPL-CCNC: 63 UNIT/L (ref 40–150)
ALT SERPL-CCNC: 37 UNIT/L (ref 0–55)
APPEARANCE UR: CLEAR
AST SERPL-CCNC: 35 UNIT/L (ref 5–34)
BACTERIA #/AREA URNS AUTO: ABNORMAL /HPF
BASOPHILS # BLD AUTO: 0.05 X10(3)/MCL
BASOPHILS NFR BLD AUTO: 0.8 %
BILIRUB SERPL-MCNC: 0.3 MG/DL
BILIRUB UR QL STRIP.AUTO: NEGATIVE
BUN SERPL-MCNC: 25 MG/DL (ref 9.8–20.1)
CALCIUM SERPL-MCNC: 9.9 MG/DL (ref 8.4–10.2)
CHLORIDE SERPL-SCNC: 108 MMOL/L (ref 98–107)
CO2 SERPL-SCNC: 27 MMOL/L (ref 23–31)
COLOR UR AUTO: YELLOW
CREAT SERPL-MCNC: 1.52 MG/DL (ref 0.55–1.02)
EOSINOPHIL # BLD AUTO: 0.33 X10(3)/MCL (ref 0–0.9)
EOSINOPHIL NFR BLD AUTO: 5.1 %
ERYTHROCYTE [DISTWIDTH] IN BLOOD BY AUTOMATED COUNT: 15.5 % (ref 11.5–17)
FLUAV AG UPPER RESP QL IA.RAPID: NOT DETECTED
FLUBV AG UPPER RESP QL IA.RAPID: NOT DETECTED
GFR SERPLBLD CREATININE-BSD FMLA CKD-EPI: 35 MLS/MIN/1.73/M2
GLOBULIN SER-MCNC: 3.1 GM/DL (ref 2.4–3.5)
GLUCOSE SERPL-MCNC: 90 MG/DL (ref 82–115)
GLUCOSE UR QL STRIP.AUTO: NORMAL
HCT VFR BLD AUTO: 36 % (ref 37–47)
HGB BLD-MCNC: 11.4 G/DL (ref 12–16)
IMM GRANULOCYTES # BLD AUTO: 0.01 X10(3)/MCL (ref 0–0.04)
IMM GRANULOCYTES NFR BLD AUTO: 0.2 %
KETONES UR QL STRIP.AUTO: NEGATIVE
LEUKOCYTE ESTERASE UR QL STRIP.AUTO: 25
LYMPHOCYTES # BLD AUTO: 2.37 X10(3)/MCL (ref 0.6–4.6)
LYMPHOCYTES NFR BLD AUTO: 36.7 %
MAGNESIUM SERPL-MCNC: 2.1 MG/DL (ref 1.6–2.6)
MCH RBC QN AUTO: 27.3 PG (ref 27–31)
MCHC RBC AUTO-ENTMCNC: 31.7 G/DL (ref 33–36)
MCV RBC AUTO: 86.1 FL (ref 80–94)
MONOCYTES # BLD AUTO: 0.54 X10(3)/MCL (ref 0.1–1.3)
MONOCYTES NFR BLD AUTO: 8.4 %
MUCOUS THREADS URNS QL MICRO: ABNORMAL /LPF
NEUTROPHILS # BLD AUTO: 3.16 X10(3)/MCL (ref 2.1–9.2)
NEUTROPHILS NFR BLD AUTO: 48.8 %
NITRITE UR QL STRIP.AUTO: NEGATIVE
NRBC BLD AUTO-RTO: 0 %
PH UR STRIP.AUTO: 5.5 [PH]
PLATELET # BLD AUTO: 158 X10(3)/MCL (ref 130–400)
PMV BLD AUTO: 10.9 FL (ref 7.4–10.4)
POTASSIUM SERPL-SCNC: 4 MMOL/L (ref 3.5–5.1)
PROT SERPL-MCNC: 7.2 GM/DL (ref 5.8–7.6)
PROT UR QL STRIP.AUTO: NEGATIVE
RBC # BLD AUTO: 4.18 X10(6)/MCL (ref 4.2–5.4)
RBC #/AREA URNS AUTO: ABNORMAL /HPF
RBC UR QL AUTO: NEGATIVE
SARS-COV-2 RNA RESP QL NAA+PROBE: NOT DETECTED
SODIUM SERPL-SCNC: 141 MMOL/L (ref 136–145)
SP GR UR STRIP.AUTO: 1.02 (ref 1–1.03)
SQUAMOUS #/AREA URNS LPF: ABNORMAL /HPF
TROPONIN I SERPL-MCNC: <0.01 NG/ML (ref 0–0.04)
UROBILINOGEN UR STRIP-ACNC: NORMAL
WBC # SPEC AUTO: 6.46 X10(3)/MCL (ref 4.5–11.5)
WBC #/AREA URNS AUTO: ABNORMAL /HPF

## 2023-11-16 PROCEDURE — 93005 ELECTROCARDIOGRAM TRACING: CPT

## 2023-11-16 PROCEDURE — 85025 COMPLETE CBC W/AUTO DIFF WBC: CPT | Performed by: NURSE PRACTITIONER

## 2023-11-16 PROCEDURE — 83735 ASSAY OF MAGNESIUM: CPT | Performed by: NURSE PRACTITIONER

## 2023-11-16 PROCEDURE — 25000003 PHARM REV CODE 250: Performed by: NURSE PRACTITIONER

## 2023-11-16 PROCEDURE — 84484 ASSAY OF TROPONIN QUANT: CPT | Performed by: NURSE PRACTITIONER

## 2023-11-16 PROCEDURE — 80053 COMPREHEN METABOLIC PANEL: CPT | Performed by: NURSE PRACTITIONER

## 2023-11-16 PROCEDURE — 93010 EKG 12-LEAD: ICD-10-PCS | Mod: ,,, | Performed by: INTERNAL MEDICINE

## 2023-11-16 PROCEDURE — 99285 EMERGENCY DEPT VISIT HI MDM: CPT | Mod: 25

## 2023-11-16 PROCEDURE — 0240U COVID/FLU A&B PCR: CPT | Performed by: NURSE PRACTITIONER

## 2023-11-16 PROCEDURE — 96361 HYDRATE IV INFUSION ADD-ON: CPT

## 2023-11-16 PROCEDURE — 81001 URINALYSIS AUTO W/SCOPE: CPT | Performed by: NURSE PRACTITIONER

## 2023-11-16 PROCEDURE — 96365 THER/PROPH/DIAG IV INF INIT: CPT

## 2023-11-16 PROCEDURE — 93010 ELECTROCARDIOGRAM REPORT: CPT | Mod: ,,, | Performed by: INTERNAL MEDICINE

## 2023-11-16 PROCEDURE — 63600175 PHARM REV CODE 636 W HCPCS: Performed by: NURSE PRACTITIONER

## 2023-11-16 RX ORDER — SODIUM CHLORIDE 9 MG/ML
1000 INJECTION, SOLUTION INTRAVENOUS
Status: COMPLETED | OUTPATIENT
Start: 2023-11-16 | End: 2023-11-16

## 2023-11-16 RX ORDER — AMOXICILLIN AND CLAVULANATE POTASSIUM 875; 125 MG/1; MG/1
1 TABLET, FILM COATED ORAL 2 TIMES DAILY
Qty: 20 TABLET | Refills: 0 | Status: SHIPPED | OUTPATIENT
Start: 2023-11-16 | End: 2023-11-22

## 2023-11-16 RX ADMIN — SODIUM CHLORIDE 1000 ML: 9 INJECTION, SOLUTION INTRAVENOUS at 06:11

## 2023-11-16 RX ADMIN — CEFTRIAXONE SODIUM 1 G: 1 INJECTION, POWDER, FOR SOLUTION INTRAMUSCULAR; INTRAVENOUS at 06:11

## 2023-11-16 NOTE — FIRST PROVIDER EVALUATION
Medical screening examination initiated.  I have conducted a focused provider triage encounter, findings are as follows:    Brief history of present illness:  77 y/o female who presents with daughter for confusion since Monday. Not eating/drinking. Hx dementia but not to this degree    There were no vitals filed for this visit.    Pertinent physical exam:  alert, nonlabored, not answering questions appropriately, uncooperative    Brief workup plan:  EKG, labs, urine    Preliminary workup initiated; this workup will be continued and followed by the physician or advanced practice provider that is assigned to the patient when roomed.

## 2023-11-16 NOTE — TELEPHONE ENCOUNTER
----- Message from Lisa Sinclair sent at 11/16/2023  9:10 AM CST -----  Regarding: Needs Med Advice  .Type:  Needs Medical Advice    Who Called: pt's daughter-Barbie  Symptoms (please be specific): anxious   How long has patient had these symptoms:  a couple of weeks  Pharmacy name and phone #  Would the patient rather a call back or a response via [facility]?   Best Call Back Number: [phone]  Additional Information: pt's daughter states she received an message from her mother's day program and they have stated to her mother  been anxious and do not want to listen, please advise

## 2023-11-16 NOTE — TELEPHONE ENCOUNTER
Spoke to pt's daughter, Barbie, and she stated that pt is currently at [facility] ED, because the day care center said that pt has been out of it, not wanting to take her medications, and her urine odor is strong. Pt's daughter was trying to see if they could just come her for a visit and leave ED. I let her know that we do not have any openings, but I would let Dr. Fernandez know what was going on. I also advised that they stay at the ED to be seen by a provider.

## 2023-11-16 NOTE — TELEPHONE ENCOUNTER
Spoke to pt's daughter. Pt's daughter Verbally confirmed understanding that they should stay at the ED

## 2023-11-16 NOTE — TELEPHONE ENCOUNTER
Rianna Shah Staff  Caller: Unspecified (Today,  1:51 PM)  .Type:  Needs Medical Advice    Who Called:  pt's daughter    Symptoms (please be specific):  no     How long has patient had these symptoms:   no    Pharmacy name and phone #:   no    Would the patient rather a call back? Yes    Best Call Back Number: [phone]    Additional Information:  she says her mom is in a facility and her mouth is turning she wants to know if she can come to the office

## 2023-11-17 NOTE — ED PROVIDER NOTES
"Encounter Date: 11/16/2023       History     Chief Complaint   Patient presents with    Altered Mental Status     Daughter reports increased AMS onset Monday. Pt refusing care/not wanting to answer questions in triage. Hx dementia, but daughter states usually able to answer all questions appropriately. No facial droop/slurred speech.      Patient has hx. Of Dementia. Patient's daughter states that patient has "not been herself" x 3 days. 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 States that patient has had "strong smelling" urine and frequent urination and she is concerned that the patient may have a UTI. Denies any fever, vomiting, nausea, abdominal pain, diarrhea, or coughing. Patient denies any complaints. Hx. Of Alzheimer's Dementia, HTN.     The history is provided by the patient and a relative. The history is limited by the condition of the patient (Dementia).     Review of patient's allergies indicates:   Allergen Reactions    Adhesive tape-silicones      Past Medical History:   Diagnosis Date    Alzheimer's dementia 8/19/2022    CAD (coronary artery disease)     Dyslipidemia     Hypertension      Past Surgical History:   Procedure Laterality Date    CHOLECYSTECTOMY       Family History   Problem Relation Age of Onset    Diabetes Mother     Lung cancer Father      Social History     Tobacco Use    Smoking status: Never    Smokeless tobacco: Never   Substance Use Topics    Alcohol use: Not Currently    Drug use: Never     Review of Systems    Physical Exam     Initial Vitals [11/16/23 1542]   BP Pulse Resp Temp SpO2   121/89 73 18 98.3 °F (36.8 °C) 100 %      MAP       --         Physical Exam    Nursing note and vitals reviewed.  Constitutional: She appears well-developed and well-nourished. No distress.   HENT:   Head: Normocephalic and atraumatic.   Mouth/Throat: Oropharynx is clear and moist.   Eyes: Conjunctivae and EOM are normal. Pupils are " equal, round, and reactive to light.   Neck: Neck supple.   Normal range of motion.  Cardiovascular:  Normal rate, regular rhythm, normal heart sounds and intact distal pulses.           Pulmonary/Chest: Breath sounds normal. No respiratory distress. She has no wheezes.   Abdominal: Abdomen is soft. Bowel sounds are normal. She exhibits no distension. There is no abdominal tenderness.   Musculoskeletal:         General: No tenderness or edema. Normal range of motion.      Cervical back: Normal range of motion and neck supple.     Neurological: She is alert and oriented to person, place, and time. She has normal strength. Gait normal. GCS eye subscore is 4. GCS verbal subscore is 5. GCS motor subscore is 6.   Patient does not . 5/5 motor strength in upper and lower extremities bilaterally.    Skin: Skin is warm and dry. No rash noted.   Psychiatric: She has a normal mood and affect. Thought content normal.         ED Course   Procedures  Labs Reviewed   CBC WITH DIFFERENTIAL - Abnormal; Notable for the following components:       Result Value    RBC 4.18 (*)     Hgb 11.4 (*)     Hct 36.0 (*)     MCHC 31.7 (*)     MPV 10.9 (*)     All other components within normal limits   COMPREHENSIVE METABOLIC PANEL - Abnormal; Notable for the following components:    Chloride 108 (*)     Blood Urea Nitrogen 25.0 (*)     Creatinine 1.52 (*)     Aspartate Aminotransferase 35 (*)     All other components within normal limits   URINALYSIS, REFLEX TO URINE CULTURE - Abnormal; Notable for the following components:    Leukocyte Esterase, UA 25 (*)     Mucous, UA Trace (*)     All other components within normal limits   MAGNESIUM - Normal   TROPONIN I - Normal   COVID/FLU A&B PCR - Normal    Narrative:     The Xpert Xpress SARS-CoV-2/FLU/RSV plus is a rapid, multiplexed real-time PCR test intended for the simultaneous qualitative detection and differentiation of SARS-CoV-2, Influenza A, Influenza B, and respiratory syncytial virus (RSV)  viral RNA in either nasopharyngeal swab or nasal swab specimens.           EKG Readings: (Independently Interpreted)   Rhythm: Normal Sinus Rhythm. Heart Rate: 63. Ectopy: No Ectopy. Conduction: Normal. ST Segments: Normal ST Segments. T Waves: Normal. Axis: Left Axis Deviation. Clinical Impression: Normal Sinus Rhythm       Imaging Results              CT Head Without Contrast (Final result)  Result time 11/16/23 17:00:57      Final result by Marisol Weston MD (11/16/23 17:00:57)                   Impression:      Chronic age-related changes.  No acute process      Electronically signed by: Marisol Weston  Date:    11/16/2023  Time:    17:00               Narrative:    EXAMINATION:  CT HEAD WITHOUT CONTRAST    CLINICAL HISTORY:  Transient ischemic attack (TIA);    TECHNIQUE:  Multiple axial images were obtained from the base of the brain to the vertex without contrast administration.  Sagittal and coronal reconstructions were performed..Automatic exposure control is utilized to reduce patient radiation exposure.    COMPARISON:  05/17/2023    FINDINGS:  There is no intracranial mass or lesion seen.  No hemorrhage is seen.  No acute infarct is seen.  There is some basal ganglia calcifications bilaterally there is some cerebral atrophy seen.  There is some compensatory ventricular dilatation and periventricular white matter changes consistent with the patient's age.  Calvarium is intact.  The posterior fossa is unremarkable..  The visualized portions of the paranasal sinuses show no acute abnormality.                                       X-Ray Chest 1 View (Final result)  Result time 11/16/23 16:13:14      Final result by Alek Adams MD (11/16/23 16:13:14)                   Impression:      No acute pulmonary process identified.      Electronically signed by: Alek Adams  Date:    11/16/2023  Time:    16:13               Narrative:    EXAMINATION:  XR CHEST 1 VIEW    CLINICAL  "HISTORY:  confusion;    TECHNIQUE:  Frontal view(s) of the chest.    COMPARISON:  Rib radiographs 08/19/2022    FINDINGS:  Prior sternotomy.  Similar tortuosity of the descending thoracic aorta.  Heart size within normal limits.  The lungs are well-inflated.  No consolidation identified.  No significant pleural effusion or discernible pneumothorax.                                       Medications   0.9%  NaCl infusion (0 mLs Intravenous Stopped 11/16/23 2008)   cefTRIAXone (ROCEPHIN) 1 g in dextrose 5 % in water (D5W) 100 mL IVPB (MB+) (0 g Intravenous Stopped 11/16/23 1925)     Medical Decision Making  Patient has hx. Of Dementia. Patient's daughter states that patient has "not been herself" x 3 days. 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 States that patient has had "strong smelling" urine and frequent urination and she is concerned that the patient may have a UTI. Denies any fever, vomiting, nausea, abdominal pain, diarrhea, or coughing. Patient denies any complaints. Hx. Of Alzheimer's Dementia, HTN.     The history is provided by the patient and a relative. The history is limited by the condition of the patient (Dementia).   Patient is awake, alert, afebrile, and nontoxic appearing in the ED. Patient is ambulatory in the ED.     Amount and/or Complexity of Data Reviewed  Independent Historian: caregiver     Details: History obtained per patient's daughter.   Labs: ordered. Decision-making details documented in ED Course.  Radiology: ordered. Decision-making details documented in ED Course.  ECG/medicine tests: ordered. Decision-making details documented in ED Course.  Discussion of management or test interpretation with external provider(s): Differential diagnosis (including but not limited to):   Judging by the patient's chief complaint and pertinent history, the patient has the following possible differential diagnoses, including but not limited to " the following.  Some of these are deemed to be lower likelihood and some more likely based on my physical exam and history combined with possible lab work and/or imaging studies.   Please see the pertinent studies, and refer to the HPI.  Some of these diagnoses will take further evaluation to fully rule out, perhaps as an outpatient and the patient was encouraged to follow up when discharged for more comprehensive evaluation.  UTI, Dementia, Electrolyte Abnormality   Labs appear near or at patient's baseline and CT scan of head shows no acute changes. UA shows a mild UTI. Discussed with patient's daughter and offered admission. Patient's daughter states that patient is nearly at her baseline for cognition and she was only concerned for a UTI. Patient was given Rocephin in the ED and will discharge patient with antibiotics and strict ED return precautions. Instructed to have patient follow-up with her PCP.       Risk  Prescription drug management.               ED Course as of 11/16/23 2040   Thu Nov 16, 2023   1837 Comprehensive Metabolic Panel(!) [AB]   1837 CBC with Differential(!) [AB]   1837 Urinalysis, Reflex to Urine Culture(!) [AB]   1837 Magnesium [AB]   1837 Troponin I [AB]   1837 CT Head Without Contrast [AB]   1837 X-Ray Chest 1 View [AB]      ED Course User Index  [AB] Loren Urrutia FNP                        Clinical Impression:  Final diagnoses:  [R41.0] Confusion  [N39.0] Urinary tract infection without hematuria, site unspecified (Primary)          ED Disposition Condition    Discharge Stable          ED Prescriptions       Medication Sig Dispense Start Date End Date Auth. Provider    amoxicillin-clavulanate 875-125mg (AUGMENTIN) 875-125 mg per tablet Take 1 tablet by mouth 2 (two) times daily. for 10 days 20 tablet 11/16/2023 11/26/2023 Loren Urrutia FNP          Follow-up Information       Follow up With Specialties Details Why Contact Info    Dewey Cazares MD Internal Medicine In  3 days  [address]  [facility] [id number]  [phone]               Loren Urrutia, [facility]  11/16/23 2040

## 2023-11-22 ENCOUNTER — OFFICE VISIT (OUTPATIENT)
Dept: INTERNAL MEDICINE | Facility: CLINIC | Age: 78
End: 2023-11-22
Payer: MEDICARE

## 2023-11-22 VITALS
SYSTOLIC BLOOD PRESSURE: 136 MMHG | OXYGEN SATURATION: 96 % | TEMPERATURE: 97 F | DIASTOLIC BLOOD PRESSURE: 84 MMHG | BODY MASS INDEX: 25.32 KG/M2 | WEIGHT: 129 LBS | HEART RATE: 59 BPM | HEIGHT: 60 IN | RESPIRATION RATE: 16 BRPM

## 2023-11-22 DIAGNOSIS — F02.80 ALZHEIMER DISEASE: Primary | Chronic | ICD-10-CM

## 2023-11-22 DIAGNOSIS — G30.9 ALZHEIMER DISEASE: Primary | Chronic | ICD-10-CM

## 2023-11-22 PROCEDURE — 1101F PR PT FALLS ASSESS DOC 0-1 FALLS W/OUT INJ PAST YR: ICD-10-PCS | Mod: CPTII,,, | Performed by: INTERNAL MEDICINE

## 2023-11-22 PROCEDURE — 1159F MED LIST DOCD IN RCRD: CPT | Mod: CPTII,,, | Performed by: INTERNAL MEDICINE

## 2023-11-22 PROCEDURE — 3079F DIAST BP 80-89 MM HG: CPT | Mod: CPTII,,, | Performed by: INTERNAL MEDICINE

## 2023-11-22 PROCEDURE — 99214 PR OFFICE/OUTPT VISIT, EST, LEVL IV, 30-39 MIN: ICD-10-PCS | Mod: ,,, | Performed by: INTERNAL MEDICINE

## 2023-11-22 PROCEDURE — 3075F SYST BP GE 130 - 139MM HG: CPT | Mod: CPTII,,, | Performed by: INTERNAL MEDICINE

## 2023-11-22 PROCEDURE — 99214 OFFICE O/P EST MOD 30 MIN: CPT | Mod: ,,, | Performed by: INTERNAL MEDICINE

## 2023-11-22 PROCEDURE — 1160F RVW MEDS BY RX/DR IN RCRD: CPT | Mod: CPTII,,, | Performed by: INTERNAL MEDICINE

## 2023-11-22 PROCEDURE — 1159F PR MEDICATION LIST DOCUMENTED IN MEDICAL RECORD: ICD-10-PCS | Mod: CPTII,,, | Performed by: INTERNAL MEDICINE

## 2023-11-22 PROCEDURE — 3079F PR MOST RECENT DIASTOLIC BLOOD PRESSURE 80-89 MM HG: ICD-10-PCS | Mod: CPTII,,, | Performed by: INTERNAL MEDICINE

## 2023-11-22 PROCEDURE — 1101F PT FALLS ASSESS-DOCD LE1/YR: CPT | Mod: CPTII,,, | Performed by: INTERNAL MEDICINE

## 2023-11-22 PROCEDURE — 3288F PR FALLS RISK ASSESSMENT DOCUMENTED: ICD-10-PCS | Mod: CPTII,,, | Performed by: INTERNAL MEDICINE

## 2023-11-22 PROCEDURE — 3288F FALL RISK ASSESSMENT DOCD: CPT | Mod: CPTII,,, | Performed by: INTERNAL MEDICINE

## 2023-11-22 PROCEDURE — 1160F PR REVIEW ALL MEDS BY PRESCRIBER/CLIN PHARMACIST DOCUMENTED: ICD-10-PCS | Mod: CPTII,,, | Performed by: INTERNAL MEDICINE

## 2023-11-22 PROCEDURE — 3075F PR MOST RECENT SYSTOLIC BLOOD PRESS GE 130-139MM HG: ICD-10-PCS | Mod: CPTII,,, | Performed by: INTERNAL MEDICINE

## 2023-11-22 RX ORDER — MEMANTINE HYDROCHLORIDE 5 MG/1
5 TABLET ORAL 2 TIMES DAILY
Qty: 60 TABLET | Refills: 11
Start: 2023-11-22 | End: 2024-01-08

## 2023-11-22 RX ORDER — ESCITALOPRAM OXALATE 10 MG/1
TABLET ORAL
Qty: 90 TABLET | Refills: 3 | Status: SHIPPED | OUTPATIENT
Start: 2023-11-22

## 2023-11-22 NOTE — PROGRESS NOTES
Subjective:      Patient ID: Grace Allen is a 78 y.o. female.    Chief Complaint: Follow-up (Ed f/u for mood)      HPI:  78-year-old female patient here today for dementia  She sad, she is anxious  Having disease progression  Seroquel 2 at night and 1 in the morning   Take Zoloft in the morning  She has some upset stomach and diarrhea but she is on Augmentin for presumed UTI, all of the testing from the hospital are negative she does not have a urinary tract infection advised to discontinue Augmentin      Past Medical History:  Past Medical History:   Diagnosis Date    Alzheimer's dementia 8/19/2022    CAD (coronary artery disease)     Dyslipidemia     Hypertension      Past Surgical History:   Procedure Laterality Date    CHOLECYSTECTOMY       Review of patient's allergies indicates:   Allergen Reactions    Adhesive tape-silicones      Current Outpatient Medications on File Prior to Visit   Medication Sig Dispense Refill    aspirin 81 MG Chew Take 81 mg by mouth once daily at 6am.      atorvastatin (LIPITOR) 20 MG tablet Take 1 tablet (20 mg total) by mouth every evening. 90 tablet 3    cetirizine (ZYRTEC) 10 MG tablet Take 10 mg by mouth once daily.      clotrimazole-betamethasone 1-0.05% (LOTRISONE) cream APPLY TO AFFECTED AREA TWICE A DAY 15 g 1    docusate sodium (COLACE) 50 MG capsule Take 1 capsule (50 mg total) by mouth 2 (two) times daily as needed for Constipation.  0    ferrous gluconate (FERGON) 324 MG tablet Take 1 tablet (324 mg total) by mouth daily with breakfast. 90 tablet 3    fluticasone propionate (FLONASE) 50 mcg/actuation nasal spray 1 spray (50 mcg total) by Each Nostril route once daily. 16 g 5    metoprolol succinate (TOPROL-XL) 25 MG 24 hr tablet Take 12.5 mg by mouth once daily.      pantoprazole (PROTONIX) 40 MG tablet Take 40 mg by mouth once daily at 6am.      polyethylene glycol (GLYCOLAX) 17 gram PwPk Take 17 g by mouth 2 (two) times daily as needed (constipation).  0    QUEtiapine  (SEROQUEL) 25 MG Tab Take 1 tablet (25 mg total) by mouth 2 (two) times daily. And take extra 1/2 pill at 5pm 60 tablet 11    vitamin D (VITAMIN D3) 1000 units Tab Take 5,000 Units by mouth.      [DISCONTINUED] amoxicillin-clavulanate 875-125mg (AUGMENTIN) 875-125 mg per tablet Take 1 tablet by mouth 2 (two) times daily. for 10 days 20 tablet 0    [DISCONTINUED] memantine (NAMENDA) 5 MG Tab Take 1 tablet (5 mg total) by mouth 2 (two) times daily. 60 tablet 11    [DISCONTINUED] sertraline (ZOLOFT) 50 MG tablet Take 1 tablet (50 mg total) by mouth once daily. 30 tablet 11     No current facility-administered medications on file prior to visit.     Social History     Socioeconomic History    Marital status:    Tobacco Use    Smoking status: Never    Smokeless tobacco: Never   Substance and Sexual Activity    Alcohol use: Not Currently    Drug use: Never    Sexual activity: Not Currently     Social Determinants of Health     Financial Resource Strain: Low Risk  (8/8/2023)    Overall Financial Resource Strain (CARDIA)     Difficulty of Paying Living Expenses: Not hard at all   Food Insecurity: No Food Insecurity (8/8/2023)    Hunger Vital Sign     Worried About Running Out of Food in the Last Year: Never true     Ran Out of Food in the Last Year: Never true   Transportation Needs: No Transportation Needs (8/8/2023)    PRAPARE - Transportation     Lack of Transportation (Medical): No     Lack of Transportation (Non-Medical): No   Physical Activity: Sufficiently Active (8/8/2023)    Exercise Vital Sign     Days of Exercise per Week: 5 days     Minutes of Exercise per Session: 60 min   Stress: No Stress Concern Present (8/8/2023)    [employer] - Occupational Stress Questionnaire     Feeling of Stress : Not at all   Social Connections: Moderately Isolated (8/8/2023)    Social Connection and Isolation Panel [NHANES]     Frequency of Communication with Friends and Family: More than three  times a week     Frequency of Social Gatherings with Friends and Family: More than three times a week     Attends Orthodoxy Services: Never     Active Member of Clubs or Organizations: Yes     Attends Club or Organization Meetings: More than 4 times per year     Marital Status:    Housing Stability: Low Risk  (8/8/2023)    Housing Stability Vital Sign     Unable to Pay for Housing in the Last Year: No     Number of Places Lived in the Last Year: 1     Unstable Housing in the Last Year: No     Family History   Problem Relation Age of Onset    Diabetes Mother     Lung cancer Father        Review of Systems  A comprehensive review of systems was performed and was negative with exception of what is documented above.     Objective:   /84 (BP Location: Left arm, Patient Position: Sitting, BP Method: Medium (Manual))   Pulse (!) 59   Temp 96.9 °F (36.1 °C) (Temporal)   Resp 16   Ht 5' (1.524 m)   Wt 58.5 kg (129 lb)   SpO2 96%   BMI 25.19 kg/m²   Physical Exam  General : Alert and oriented, No acute distress, afebrile.  Eye : PERRLA. EOMI. Normal conjunctiva, Sclerae are nonicteric.  Respiratory : Respirations are non-labored and clear to auscultation bilaterally. Symmetrical air entry bilaterally, no crackles, no wheezes, no rhonchi. No cyanosis, no clubbing.  Cardiovascular : Normal rate, Regular rhythm. No murmurs, rubs, or gallops. Pulses are 2+ throughout. No JVD. No Edema.  Gastrointestinal : Soft, nontender, non-distended, bowel sounds are present in all quadrants, no organomegaly, no guarding, no rebound.  Musculoskeletal : Normal range of motion throughout. No muscle tenderness.  Integumentary : Warm, moist, intact.  Neurologic : Alert, Oriented  Psychiatric : Sad, tearful  Assessment/ Plan:   1. Alzheimer disease  Assessment & Plan:  Discontinue Zoloft, trial of Lexapro  Referral to home health  Discussion with daughter that I feel like her dementia is progressing and her daughter needs to  work she really needs to start looking at some facilities.  RTC 6 weeks    Orders:  -     Ambulatory referral/consult to Home Health; Future; Expected date: 11/23/2023    Other orders  -     EScitalopram oxalate (LEXAPRO) 10 MG tablet; 1/2 tab daily for a week; then whole tab daily thereafter  Dispense: 90 tablet; Refill: 3  -     memantine (NAMENDA) 5 MG Tab; Take 1 tablet (5 mg total) by mouth 2 (two) times daily.  Dispense: 60 tablet; Refill: 11             Follow up in about 6 weeks (around 1/3/2024) for NURSE PRACTITIONER.

## 2023-11-22 NOTE — ASSESSMENT & PLAN NOTE
Discontinue Zoloft, trial of Lexapro  Referral to home health  Discussion with daughter that I feel like her dementia is progressing and her daughter needs to work she really needs to start looking at some facilities.  RTC 6 weeks

## 2023-11-27 ENCOUNTER — TELEPHONE (OUTPATIENT)
Dept: INTERNAL MEDICINE | Facility: CLINIC | Age: 78
End: 2023-11-27
Payer: MEDICARE

## 2023-11-27 NOTE — TELEPHONE ENCOUNTER
Spoke to pt's daughter and she need to verify when to take the Lexapro, morning or evening? She would also like to know if there is a weaning process to follow for the Zoloft, or just stop it completely?

## 2023-12-07 ENCOUNTER — TELEPHONE (OUTPATIENT)
Dept: INTERNAL MEDICINE | Facility: CLINIC | Age: 78
End: 2023-12-07
Payer: MEDICARE

## 2023-12-07 NOTE — TELEPHONE ENCOUNTER
----- Message from Dewey Cazares MD sent at 12/7/2023 12:17 PM CST -----  Is she on the 10mg dose or  the 5mg?  ----- Message -----  From: Radha Lane  Sent: 12/7/2023  11:13 AM CST  To: Dewey Cazares MD    Pt's daughter called stating ever since pt has been on Lexapro ([facility]), things has been worst.    Symptoms:  Anxiety                      Dont't want to do anything                      Don't want to eat                      Not Sleeping                      When asleep, having Nightmares

## 2023-12-14 ENCOUNTER — TELEPHONE (OUTPATIENT)
Dept: INTERNAL MEDICINE | Facility: CLINIC | Age: 78
End: 2023-12-14
Payer: MEDICARE

## 2023-12-14 NOTE — TELEPHONE ENCOUNTER
----- Message from Jayden Carballo sent at 12/14/2023  2:04 PM CST -----  [facility] does not take pt insurance .

## 2023-12-18 ENCOUNTER — OFFICE VISIT (OUTPATIENT)
Dept: URGENT CARE | Facility: CLINIC | Age: 78
End: 2023-12-18
Payer: MEDICARE

## 2023-12-18 VITALS
TEMPERATURE: 99 F | WEIGHT: 125 LBS | RESPIRATION RATE: 17 BRPM | HEIGHT: 65 IN | DIASTOLIC BLOOD PRESSURE: 79 MMHG | SYSTOLIC BLOOD PRESSURE: 123 MMHG | OXYGEN SATURATION: 98 % | BODY MASS INDEX: 20.83 KG/M2 | HEART RATE: 60 BPM

## 2023-12-18 DIAGNOSIS — J06.9 ACUTE URI: ICD-10-CM

## 2023-12-18 DIAGNOSIS — R05.9 COUGH, UNSPECIFIED TYPE: Primary | ICD-10-CM

## 2023-12-18 LAB
CTP QC/QA: YES
CTP QC/QA: YES
POC MOLECULAR INFLUENZA A AGN: NEGATIVE
POC MOLECULAR INFLUENZA B AGN: NEGATIVE
SARS-COV-2 RDRP RESP QL NAA+PROBE: NEGATIVE

## 2023-12-18 PROCEDURE — 99203 PR OFFICE/OUTPT VISIT, NEW, LEVL III, 30-44 MIN: ICD-10-PCS | Mod: ,,, | Performed by: PHYSICIAN ASSISTANT

## 2023-12-18 PROCEDURE — 87635: ICD-10-PCS | Mod: QW,,, | Performed by: PHYSICIAN ASSISTANT

## 2023-12-18 PROCEDURE — 87635 SARS-COV-2 COVID-19 AMP PRB: CPT | Mod: QW,,, | Performed by: PHYSICIAN ASSISTANT

## 2023-12-18 PROCEDURE — 99203 OFFICE O/P NEW LOW 30 MIN: CPT | Mod: ,,, | Performed by: PHYSICIAN ASSISTANT

## 2023-12-18 PROCEDURE — 87502 INFLUENZA DNA AMP PROBE: CPT | Mod: QW,,, | Performed by: PHYSICIAN ASSISTANT

## 2023-12-18 PROCEDURE — 87502 POCT INFLUENZA A/B MOLECULAR: ICD-10-PCS | Mod: QW,,, | Performed by: PHYSICIAN ASSISTANT

## 2023-12-18 RX ORDER — QUETIAPINE FUMARATE 25 MG/1
TABLET, FILM COATED ORAL
COMMUNITY
End: 2024-02-19

## 2023-12-18 RX ORDER — ATORVASTATIN CALCIUM 20 MG/1
20 TABLET, FILM COATED ORAL
COMMUNITY
Start: 2023-12-18 | End: 2024-02-09

## 2023-12-18 RX ORDER — CETIRIZINE HYDROCHLORIDE 10 MG/1
10 TABLET ORAL DAILY
COMMUNITY

## 2023-12-18 RX ORDER — PREDNISONE 10 MG/1
10 TABLET ORAL DAILY
Qty: 5 TABLET | Refills: 0 | Status: SHIPPED | OUTPATIENT
Start: 2023-12-18 | End: 2023-12-23

## 2023-12-18 RX ORDER — METOPROLOL SUCCINATE 25 MG/1
12.5 TABLET, EXTENDED RELEASE ORAL
COMMUNITY
Start: 2023-11-15

## 2023-12-18 RX ORDER — SERTRALINE HYDROCHLORIDE 50 MG/1
50 TABLET, FILM COATED ORAL
COMMUNITY
Start: 2023-11-16 | End: 2024-02-09 | Stop reason: SDUPTHER

## 2023-12-18 RX ORDER — METHOCARBAMOL 500 MG/1
500 TABLET, FILM COATED ORAL 4 TIMES DAILY
COMMUNITY

## 2023-12-18 RX ORDER — PROMETHAZINE HYDROCHLORIDE AND DEXTROMETHORPHAN HYDROBROMIDE 6.25; 15 MG/5ML; MG/5ML
5 SYRUP ORAL EVERY 8 HOURS PRN
Qty: 118 ML | Refills: 0 | Status: SHIPPED | OUTPATIENT
Start: 2023-12-18 | End: 2023-12-23

## 2023-12-18 RX ORDER — ERGOCALCIFEROL 1.25 MG/1
50000 CAPSULE ORAL
COMMUNITY

## 2023-12-18 RX ORDER — ASPIRIN 81 MG/1
81 TABLET ORAL DAILY
COMMUNITY

## 2023-12-18 RX ORDER — MEMANTINE HYDROCHLORIDE 5 MG/1
5 TABLET ORAL 2 TIMES DAILY
COMMUNITY
Start: 2023-11-17

## 2023-12-18 NOTE — LETTER
December 18, 2023      [facility] [place] [address]  [place] [address]  Phone: [phone]       Patient: Grace Garrison   YOB: 1945  Date of Visit: 12/18/2023    To Whom It May Concern:    Bhavin Garrison  was at [facility] on 12/18/2023. The patient may return to work/school on 12/19/2023 with no restrictions. If you have any questions or concerns, or if I can be of further assistance, please do not hesitate to contact me.    Sincerely,    LILLY Garcia

## 2023-12-18 NOTE — PROGRESS NOTES
"Subjective:      Patient ID: Grace Garrison is a 78 y.o. female.    Vitals:  height is 5' 5" (1.651 m) and weight is 56.7 kg (125 lb). Her temperature is 98.8 °F (37.1 °C). Her blood pressure is 123/79 and her pulse is 60. Her respiration is 17 and oxygen saturation is 98%.     Chief Complaint: Cough (Cough x this morning - pt has dementia: brought in by daughter adan )    HPI   daughter reports patient with dementia having acute coughing and URI symptoms this morning after dropped off at adult day program sent home early today transported to urgent Care for initial evaluation.  Daughter reports recent visiting family gathering over the past weekend with unknown viral sick contacts.   Cough     Additional comments: Cough x this morning - pt has dementia: brought in   by daughter adan     Cough  This is a new problem. The current episode started today. The problem has been gradually worsening. Pertinent negatives include no ear pain, fever, headaches, sore throat, shortness of breath or wheezing.       Constitution: Negative for fever.   HENT:  Positive for congestion. Negative for ear pain, sore throat, trouble swallowing and voice change.    Neck: Negative for neck pain and neck swelling.   Cardiovascular: Negative.    Respiratory:  Positive for cough. Negative for shortness of breath, stridor and wheezing.    Gastrointestinal: Negative.    Skin: Negative.  Negative for erythema.   Allergic/Immunologic: Negative.    Neurological:  Negative for headaches.   Psychiatric/Behavioral: Negative.        Objective:     Physical Exam   Constitutional: She appears well-developed. She is cooperative.  Non-toxic appearance.      Comments:  Awake alert ambulatory female with active dry cough attended by daughter     HENT:   Head: Normocephalic.   Ears:   Right Ear: Hearing, tympanic membrane, external ear and ear canal normal.   Left Ear: Hearing, tympanic membrane, external ear and ear canal normal.   Nose: Congestion present. " No mucosal edema, rhinorrhea or nasal deformity. No epistaxis. Right sinus exhibits no maxillary sinus tenderness and no frontal sinus tenderness. Left sinus exhibits no maxillary sinus tenderness and no frontal sinus tenderness.   Mouth/Throat: Uvula is midline, oropharynx is clear and moist and mucous membranes are normal. Mucous membranes are moist. No trismus in the jaw. Normal dentition. No uvula swelling. No oropharyngeal exudate, posterior oropharyngeal edema or posterior oropharyngeal erythema.   Eyes: Conjunctivae and lids are normal. No scleral icterus.   Neck: Trachea normal and phonation normal. Neck supple. No edema present. No erythema present. No neck rigidity present.   Cardiovascular: Normal rate, regular rhythm, normal heart sounds and normal pulses.   No murmur heard.Exam reveals no gallop.   Pulmonary/Chest: Effort normal and breath sounds normal. No stridor. No respiratory distress. She has no decreased breath sounds. She has no wheezes. She has no rhonchi. She has no rales.   Musculoskeletal: Normal range of motion.         General: No swelling. Normal range of motion.      Cervical back: She exhibits no tenderness.   Lymphadenopathy:     She has no cervical adenopathy.   Neurological: She is alert. She displays no weakness. She exhibits normal muscle tone.   Skin: Skin is warm, dry, intact, not diaphoretic and not pale. No erythema   Psychiatric: Her speech is normal and behavior is normal. Mood normal.   Nursing note and vitals reviewed.       Previous History      Review of patient's allergies indicates:  No Known Allergies    Past Medical History:   Diagnosis Date    Dementia     Hyperlipidemia     Hypertension      Current Outpatient Medications   Medication Instructions    aspirin (ECOTRIN) 81 mg, Oral, Daily    atorvastatin (LIPITOR) 20 mg, Oral    cetirizine (ZYRTEC) 10 mg, Oral, Daily    ergocalciferol (VITAMIN D2) 50,000 Units, Oral, Every 7 days    memantine (NAMENDA) 5 mg, Oral, 2  "times daily    methocarbamoL (ROBAXIN) 500 mg, Oral, 4 times daily    metoprolol succinate (TOPROL-XL) 12.5 mg, Oral    predniSONE (DELTASONE) 10 mg, Oral, Daily    promethazine-dextromethorphan (PROMETHAZINE-DM) 6.25-15 mg/5 mL Syrp 5 mLs, Oral, Every 8 hours PRN    QUEtiapine (SEROQUEL) 25 MG Tab Oral    sertraline (ZOLOFT) 50 mg, Oral     Past Surgical History:   Procedure Laterality Date    CHOLECYSTECTOMY      HYSTERECTOMY      triple bypass       Family History   Problem Relation Age of Onset    No Known Problems Mother     No Known Problems Father     No Known Problems Sister     No Known Problems Brother        Social History     Tobacco Use    Smoking status: Never    Smokeless tobacco: Never   Substance Use Topics    Alcohol use: Not Currently    Drug use: Never        Physical Exam      Vital Signs Reviewed   /79   Pulse 60   Temp 98.8 °F (37.1 °C)   Resp 17   Ht 5' 5" (1.651 m)   Wt 56.7 kg (125 lb)   SpO2 98%   BMI 20.80 kg/m²        Procedures    Procedures     Labs     Results for orders placed or performed in visit on 12/18/23   POCT COVID-19 Rapid Screening   Result Value Ref Range    POC Rapid COVID Negative Negative     Acceptable Yes    POCT Influenza A/B MOLECULAR   Result Value Ref Range    POC Molecular Influenza A Ag Negative Negative, Not Reported    POC Molecular Influenza B Ag Negative Negative, Not Reported     Acceptable Yes          Assessment:     1. Cough, unspecified type    2. Acute URI        Plan:      High concern for acute viral upper respiratory illness.      Recommend rotating decongestant antihistamine nasal spray daily over the next week as needed for upper respiratory symptoms.  May add prednisone to help reduce cough congestion inflammation.  Phenergan DM sparingly lowest dose if needed for severe cough cold congestion body aches or rest.   Caution cough medication can half sedative quality.  Recommend repeat home COVID testing in " 48 hours.  May return to urgent care in 3-5 days for re-evaluation if not improving.  Care physician in 1 week for re-evaluation or recommended emergency department evaluation sooner if respiratory symptoms worsen  Cough, unspecified type  -     POCT COVID-19 Rapid Screening  -     POCT Influenza A/B MOLECULAR    Acute URI    Other orders  -     predniSONE (DELTASONE) 10 MG tablet; Take 1 tablet (10 mg total) by mouth once daily. for 5 days  Dispense: 5 tablet; Refill: 0  -     promethazine-dextromethorphan (PROMETHAZINE-DM) 6.25-15 mg/5 mL Syrp; Take 5 mLs by mouth every 8 (eight) hours as needed (cough).  Dispense: 118 mL; Refill: 0

## 2023-12-18 NOTE — PATIENT INSTRUCTIONS
High concern for acute viral upper respiratory illness.      Recommend rotating decongestant antihistamine nasal spray daily over the next week as needed for upper respiratory symptoms.  May add prednisone to help reduce cough congestion inflammation.  Phenergan DM sparingly lowest dose if needed for severe cough cold congestion body aches or rest.   Caution cough medication can half sedative quality.  Recommend repeat home COVID testing in 48 hours.  May return to urgent care in 3-5 days for re-evaluation if not improving.  Care physician in 1 week for re-evaluation or recommended emergency department evaluation sooner if respiratory symptoms worsen

## 2023-12-26 ENCOUNTER — OFFICE VISIT (OUTPATIENT)
Dept: URGENT CARE | Facility: CLINIC | Age: 78
End: 2023-12-26
Payer: MEDICARE

## 2023-12-26 VITALS
OXYGEN SATURATION: 96 % | BODY MASS INDEX: 25.32 KG/M2 | TEMPERATURE: 100 F | DIASTOLIC BLOOD PRESSURE: 76 MMHG | RESPIRATION RATE: 16 BRPM | SYSTOLIC BLOOD PRESSURE: 113 MMHG | HEIGHT: 60 IN | HEART RATE: 83 BPM | WEIGHT: 129 LBS

## 2023-12-26 DIAGNOSIS — F41.9 ANXIETY DISORDER, UNSPECIFIED: ICD-10-CM

## 2023-12-26 DIAGNOSIS — R05.9 COUGH, UNSPECIFIED TYPE: ICD-10-CM

## 2023-12-26 DIAGNOSIS — R06.02 SHORTNESS OF BREATH: Primary | ICD-10-CM

## 2023-12-26 DIAGNOSIS — J40 BRONCHITIS: ICD-10-CM

## 2023-12-26 PROCEDURE — 96372 THER/PROPH/DIAG INJ SC/IM: CPT | Mod: ,,, | Performed by: NURSE PRACTITIONER

## 2023-12-26 PROCEDURE — 99214 OFFICE O/P EST MOD 30 MIN: CPT | Mod: 25,,, | Performed by: NURSE PRACTITIONER

## 2023-12-26 PROCEDURE — 96372 PR INJECTION,THERAP/PROPH/DIAG2ST, IM OR SUBCUT: ICD-10-PCS | Mod: ,,, | Performed by: NURSE PRACTITIONER

## 2023-12-26 PROCEDURE — 99214 PR OFFICE/OUTPT VISIT, EST, LEVL IV, 30-39 MIN: ICD-10-PCS | Mod: 25,,, | Performed by: NURSE PRACTITIONER

## 2023-12-26 RX ORDER — AZITHROMYCIN 250 MG/1
TABLET, FILM COATED ORAL
Qty: 6 TABLET | Refills: 0 | Status: SHIPPED | OUTPATIENT
Start: 2023-12-26

## 2023-12-26 RX ORDER — CEFDINIR 300 MG/1
300 CAPSULE ORAL 2 TIMES DAILY
Qty: 14 CAPSULE | Refills: 0 | Status: SHIPPED | OUTPATIENT
Start: 2023-12-26 | End: 2024-01-02

## 2023-12-26 RX ORDER — QUETIAPINE FUMARATE 25 MG/1
25 TABLET, FILM COATED ORAL NIGHTLY
Qty: 45 TABLET | Refills: 7 | Status: SHIPPED | OUTPATIENT
Start: 2023-12-26 | End: 2024-02-19

## 2023-12-26 RX ORDER — CEFTRIAXONE 1 G/1
1 INJECTION, POWDER, FOR SOLUTION INTRAMUSCULAR; INTRAVENOUS
Status: COMPLETED | OUTPATIENT
Start: 2023-12-26 | End: 2023-12-26

## 2023-12-26 RX ADMIN — CEFTRIAXONE 1 G: 1 INJECTION, POWDER, FOR SOLUTION INTRAMUSCULAR; INTRAVENOUS at 06:12

## 2023-12-26 NOTE — PATIENT INSTRUCTIONS
Antibiotics sent to pharmacy begin taking the cefdinir and azithromycin tomorrow   Monitor for any increased work of breathing and if patient is unable to eat or drink effectively in the very near future please seek medical treatment immediately in the nearest emergency room.  Drink plenty of fluids    Get plenty of rest.    Follow-up with your Primary Care Provider as needed.      Present to the Emergency Department with any significant change or worsening symptoms.

## 2023-12-26 NOTE — PROGRESS NOTES
Subjective:      Patient ID: Grace Allen is a 78 y.o. female.    Vitals:  height is 5' (1.524 m) and weight is 58.5 kg (129 lb). Her temperature is 99.6 °F (37.6 °C). Her blood pressure is 113/76 and her pulse is 83. Her respiration is 16 and oxygen saturation is 96%.     Chief Complaint: Cough (Cough, wheezing x about a week - been using nebulizer - prescribed prednisone and cough med last week but not better )    This is a 78-year-old female presents to the urgent care for evaluation after having a cough and congestion like viral URI for the past 7 days symptoms are continuing to worsen and daughter has brought the patient into be re-evaluated.  Currently the patient is satting 96% on room air but continued to have a low-grade fever patient is slightly tachypneic and has a worsening nonproductive cough.  Patient states she has not in any distress but visibly she has a slightly increased work of breathing.  Patient is suffering from advanced dementia and Alzheimer's and is a poor historian.  Patient's daughter is able to describe that she is unable to take Mucinex due to the taste and her symptoms have worsened each day her appetite has decreased and her fluid intake is also decreasing.        Constitution: Positive for activity change, appetite change, chills and fatigue. Negative for fever.   Respiratory:  Positive for cough, shortness of breath and wheezing.       Objective:     Physical Exam   Constitutional: She is oriented to person, place, and time. She appears well-developed. She is cooperative.  Non-toxic appearance. She does not appear ill. No distress.   HENT:   Head: Normocephalic and atraumatic.   Ears:   Right Ear: Hearing, tympanic membrane, external ear and ear canal normal.   Left Ear: Hearing, tympanic membrane, external ear and ear canal normal.   Nose: Nose normal. No mucosal edema, rhinorrhea or nasal deformity. No epistaxis. Right sinus exhibits no maxillary sinus tenderness and no frontal  sinus tenderness. Left sinus exhibits no maxillary sinus tenderness and no frontal sinus tenderness.   Mouth/Throat: Uvula is midline, oropharynx is clear and moist and mucous membranes are normal. No trismus in the jaw. Normal dentition. No uvula swelling. No oropharyngeal exudate, posterior oropharyngeal edema or posterior oropharyngeal erythema.   Eyes: Conjunctivae and lids are normal. No scleral icterus.   Neck: Trachea normal and phonation normal. Neck supple. No edema present. No erythema present. No neck rigidity present.   Cardiovascular: Normal rate, regular rhythm, normal heart sounds and normal pulses.   Pulmonary/Chest: Accessory muscle usage present. Tachypnea noted. No respiratory distress. She has no decreased breath sounds. She has wheezes in the right upper field and the left upper field. She has rhonchi in the right lower field and the left lower field.   Abdominal: Normal appearance.   Musculoskeletal: Normal range of motion.         General: No deformity. Normal range of motion.   Neurological: She is alert and oriented to person, place, and time. She exhibits normal muscle tone. Coordination normal.   Skin: Skin is warm, dry, intact, not diaphoretic and not pale.   Psychiatric: Her speech is normal and behavior is normal. Judgment and thought content normal.   Nursing note and vitals reviewed.      Assessment:     1. Shortness of breath        Plan:   Had a long talk with patient's daughter regarding high probability of patient's status deteriorating quickly and needing evaluation in the nearest emergency room possible hospitalization.  We will follow up on chest x-ray for final reading but we will cover patient for both community-acquired pneumonia and UTI coverage with cefdinir and azithromycin.  Past records show that she does not do well with Augmentin due to diarrhea.    Encouraged close monitoring by daughter at home and if any changes were to occur they would need prompt evaluation in  nearest emergency room if there choice.  Patient's daughter is adamant about trying to treat her outpatient discussed that with her oxygen saturation at 96% this is stable enough to be at home but any further work of breathing would be the indicator to have the patient re-evaluated.    Shortness of breath  -     X-Ray Chest PA And Lateral; Future; Expected date: 12/26/2023

## 2024-01-08 ENCOUNTER — TELEPHONE (OUTPATIENT)
Dept: INTERNAL MEDICINE | Facility: CLINIC | Age: 79
End: 2024-01-08

## 2024-01-08 DIAGNOSIS — E78.5 DYSLIPIDEMIA: ICD-10-CM

## 2024-01-08 DIAGNOSIS — F41.9 ANXIETY: Primary | ICD-10-CM

## 2024-01-08 DIAGNOSIS — F32.A DEPRESSION, UNSPECIFIED DEPRESSION TYPE: ICD-10-CM

## 2024-01-08 RX ORDER — SERTRALINE HYDROCHLORIDE 100 MG/1
100 TABLET, FILM COATED ORAL DAILY
Qty: 30 TABLET | Refills: 11 | Status: SHIPPED | OUTPATIENT
Start: 2024-01-08 | End: 2024-02-09 | Stop reason: SDUPTHER

## 2024-01-08 RX ORDER — MEMANTINE HYDROCHLORIDE 5 MG/1
5 TABLET ORAL 2 TIMES DAILY
Qty: 180 TABLET | Refills: 3 | Status: SHIPPED | OUTPATIENT
Start: 2024-01-08

## 2024-01-08 RX ORDER — ATORVASTATIN CALCIUM 20 MG/1
20 TABLET, FILM COATED ORAL NIGHTLY
Qty: 90 TABLET | Refills: 3 | Status: SHIPPED | OUTPATIENT
Start: 2024-01-08 | End: 2024-02-09

## 2024-01-08 NOTE — TELEPHONE ENCOUNTER
----- Message from Cira Vieira sent at 1/8/2024 10:58 AM CST -----  .Type:  RX Refill Request    Who Called: Barbie  Refill or New Rx:Refiil  RX Name and Strength:Sertraline 50 mg  How is the patient currently taking it? (ex. 1XDay):2/day  Is this a 30 day or 90 day RX:90  Preferred Pharmacy with phone number:[facility] in [place]  Local or Mail Order:Local  Ordering Provider:Rebecca  Would the patient rather a call back or a response via [facility]?   Best Call Back Number:[phone]  Additional Information: Sertraline 50 mg   Please update prescription patient takes 2 day

## 2024-02-09 DIAGNOSIS — R30.0 DYSURIA: ICD-10-CM

## 2024-02-09 DIAGNOSIS — F41.9 ANXIETY: Primary | ICD-10-CM

## 2024-02-09 DIAGNOSIS — E78.5 DYSLIPIDEMIA: ICD-10-CM

## 2024-02-09 RX ORDER — SERTRALINE HYDROCHLORIDE 50 MG/1
50 TABLET, FILM COATED ORAL 2 TIMES DAILY
Qty: 180 TABLET | Refills: 3 | Status: SHIPPED | OUTPATIENT
Start: 2024-02-09

## 2024-02-09 RX ORDER — NITROFURANTOIN 25; 75 MG/1; MG/1
100 CAPSULE ORAL 2 TIMES DAILY
Qty: 10 CAPSULE | Refills: 0 | Status: SHIPPED | OUTPATIENT
Start: 2024-02-09 | End: 2024-02-14

## 2024-02-09 RX ORDER — ATORVASTATIN CALCIUM 20 MG/1
20 TABLET, FILM COATED ORAL NIGHTLY
Qty: 90 TABLET | Refills: 3 | Status: SHIPPED | OUTPATIENT
Start: 2024-02-09 | End: 2024-05-03 | Stop reason: SDUPTHER

## 2024-02-09 NOTE — ADDENDUM NOTE
Addended by: SHERYL VERAS on: 2/9/2024 10:08 AM     Modules accepted: Orders     Spoke with patient and went over Brenda's response.    Patient is in agreement to seeing internal medicine after visit tomorrow     Changed appointment notes to left big toe pain, referral to neuropsych to rule out ADD/Bipolar and referral to internal medicine     Has no other concerns    No

## 2024-02-19 DIAGNOSIS — F41.9 ANXIETY DISORDER, UNSPECIFIED: ICD-10-CM

## 2024-02-19 RX ORDER — QUETIAPINE FUMARATE 25 MG/1
25 TABLET, FILM COATED ORAL 2 TIMES DAILY
Qty: 60 TABLET | Refills: 7 | Status: SHIPPED | OUTPATIENT
Start: 2024-02-19

## 2024-03-05 ENCOUNTER — TELEPHONE (OUTPATIENT)
Dept: INTERNAL MEDICINE | Facility: CLINIC | Age: 79
End: 2024-03-05

## 2024-03-05 DIAGNOSIS — B37.9 YEAST INFECTION: Primary | ICD-10-CM

## 2024-03-05 RX ORDER — FLUCONAZOLE 150 MG/1
150 TABLET ORAL DAILY
Qty: 1 TABLET | Refills: 0 | Status: SHIPPED | OUTPATIENT
Start: 2024-03-05 | End: 2024-03-06

## 2024-03-05 NOTE — TELEPHONE ENCOUNTER
Patient's daughter stated that her mother has a yeast infection. She has been using some monistat, but nothing seems to be helping. Please advise?

## 2024-03-08 ENCOUNTER — TELEPHONE (OUTPATIENT)
Dept: INTERNAL MEDICINE | Facility: CLINIC | Age: 79
End: 2024-03-08

## 2024-03-08 RX ORDER — CLOTRIMAZOLE AND BETAMETHASONE DIPROPIONATE 10; .64 MG/G; MG/G
CREAM TOPICAL 2 TIMES DAILY
Qty: 15 G | Refills: 0 | Status: SHIPPED | OUTPATIENT
Start: 2024-03-08 | End: 2024-05-07

## 2024-03-08 RX ORDER — METRONIDAZOLE 7.5 MG/G
1 GEL VAGINAL NIGHTLY
Qty: 70 G | Refills: 0 | Status: SHIPPED | OUTPATIENT
Start: 2024-03-08 | End: 2024-03-13

## 2024-03-08 NOTE — TELEPHONE ENCOUNTER
----- Message from Dewey Cazares MD sent at 3/8/2024 11:20 AM CST -----  Regarding: RE: call back  Cause it may not be that, is there a discharge or is it just red and itchy?  ----- Message -----  From: Emil Sexton MA  Sent: 3/8/2024  11:12 AM CST  To: Dewey Cazares MD  Subject: FW: call back                                    RX for fluconazole 150 mg take 1 #1 was sent on 3/5/24.   ----- Message -----  From: Cristi Huerta  Sent: 3/8/2024  10:01 AM CST  To: [facility] Staff  Subject: call back                                        .Type:  Needs Medical Advice    Who Called: tearson - daughter  Symptoms (please be specific):  yeast infection  How long has patient had these symptoms:    Pharmacy name and phone #:    Would the patient rather a call back or a response via [facility]?   Best Call Back Number: [phone]  Additional Information: pt daughter states medication isn't working and is requesting a call

## 2024-05-03 ENCOUNTER — TELEPHONE (OUTPATIENT)
Dept: INTERNAL MEDICINE | Facility: CLINIC | Age: 79
End: 2024-05-03

## 2024-05-03 DIAGNOSIS — F02.80 ALZHEIMER DISEASE: Primary | ICD-10-CM

## 2024-05-03 DIAGNOSIS — E78.5 DYSLIPIDEMIA: ICD-10-CM

## 2024-05-03 DIAGNOSIS — G30.9 ALZHEIMER DISEASE: Primary | ICD-10-CM

## 2024-05-03 DIAGNOSIS — F41.9 ANXIETY: ICD-10-CM

## 2024-05-03 RX ORDER — MEMANTINE HYDROCHLORIDE 5 MG/1
5 TABLET ORAL 2 TIMES DAILY
Qty: 180 TABLET | Refills: 3 | Status: SHIPPED | OUTPATIENT
Start: 2024-05-03

## 2024-05-03 RX ORDER — ESCITALOPRAM OXALATE 20 MG/1
TABLET ORAL
Qty: 30 TABLET | Refills: 1 | Status: SHIPPED | OUTPATIENT
Start: 2024-05-03

## 2024-05-03 RX ORDER — ATORVASTATIN CALCIUM 20 MG/1
20 TABLET, FILM COATED ORAL NIGHTLY
Qty: 90 TABLET | Refills: 3 | Status: SHIPPED | OUTPATIENT
Start: 2024-05-03

## 2024-05-03 NOTE — TELEPHONE ENCOUNTER
----- Message from Zahraa Graff sent at 5/3/2024 10:29 AM CDT -----  Isela (daughter) called and said she needs to speak to nurse about her mother giving trouble and acting out and she thinks her meds may need to be adjusted because she cannot handle her she is dead weight. Can the nurse please call her.

## 2024-05-03 NOTE — TELEPHONE ENCOUNTER
Spoke with daughter concerning patients medication, she has not been taking Lexapro, instructed her to start her on 1/2 tab for 1 week and a whole tab after.

## 2024-05-07 DIAGNOSIS — B37.9 YEAST INFECTION: ICD-10-CM

## 2024-05-07 DIAGNOSIS — N95.2 VAGINAL ATROPHY: Primary | ICD-10-CM

## 2024-05-07 DIAGNOSIS — J30.2 SEASONAL ALLERGIES: Primary | ICD-10-CM

## 2024-05-07 RX ORDER — CLOTRIMAZOLE AND BETAMETHASONE DIPROPIONATE 10; .64 MG/G; MG/G
CREAM TOPICAL 2 TIMES DAILY
Qty: 15 G | Refills: 0 | Status: SHIPPED | OUTPATIENT
Start: 2024-05-07

## 2024-05-07 RX ORDER — FLUTICASONE PROPIONATE 50 MCG
SPRAY, SUSPENSION (ML) NASAL
Qty: 32 ML | Refills: 2 | Status: SHIPPED | OUTPATIENT
Start: 2024-05-07

## 2024-06-05 NOTE — TELEPHONE ENCOUNTER
----- Message from Dewey Cazares MD sent at 10/24/2022  3:44 PM CDT -----  She needs a visit  ----- Message -----  From: Francis Malhotra  Sent: 10/24/2022   1:56 PM CDT  To: Dewey Cazares MD    Pt daughter called stated her mother woke up with a black eye on the right, very congested and have a bad cough .Pt tested neg for COVID.  Pt does not have any other symptom's .Pt  daughter stated she never fell or anything on yesterday. Pt also taking her muscle relaxer for her right side fracture and the medication is not working any more. Its hard for the patient to come in or do a telemed appt . Pt daughter wanted to know if its anything you would be able to call in? Please advise?       Carb controlled diet  Monitor Glucoscan  Glargine  Humalog  FBG at goal

## 2024-07-02 DIAGNOSIS — F02.80 ALZHEIMER DISEASE: ICD-10-CM

## 2024-07-02 DIAGNOSIS — F41.9 ANXIETY: ICD-10-CM

## 2024-07-02 DIAGNOSIS — G30.9 ALZHEIMER DISEASE: ICD-10-CM

## 2024-07-02 RX ORDER — ESCITALOPRAM OXALATE 20 MG/1
TABLET ORAL
Qty: 30 TABLET | Refills: 1 | Status: SHIPPED | OUTPATIENT
Start: 2024-07-02

## 2024-07-10 ENCOUNTER — OFFICE VISIT (OUTPATIENT)
Dept: INTERNAL MEDICINE | Facility: CLINIC | Age: 79
End: 2024-07-10
Payer: COMMERCIAL

## 2024-07-10 VITALS
DIASTOLIC BLOOD PRESSURE: 72 MMHG | BODY MASS INDEX: 22.25 KG/M2 | RESPIRATION RATE: 16 BRPM | HEIGHT: 60 IN | OXYGEN SATURATION: 99 % | WEIGHT: 113.31 LBS | SYSTOLIC BLOOD PRESSURE: 102 MMHG | TEMPERATURE: 97 F | HEART RATE: 76 BPM

## 2024-07-10 DIAGNOSIS — G30.9 ALZHEIMER DISEASE: Chronic | ICD-10-CM

## 2024-07-10 DIAGNOSIS — R63.4 WEIGHT LOSS: Primary | ICD-10-CM

## 2024-07-10 DIAGNOSIS — R30.0 DYSURIA: ICD-10-CM

## 2024-07-10 DIAGNOSIS — B37.9 YEAST INFECTION: ICD-10-CM

## 2024-07-10 DIAGNOSIS — F02.80 ALZHEIMER DISEASE: Chronic | ICD-10-CM

## 2024-07-10 LAB
ALBUMIN SERPL-MCNC: 4 G/DL (ref 3.4–4.8)
ALBUMIN/GLOB SERPL: 1.3 RATIO (ref 1.1–2)
ALP SERPL-CCNC: 68 UNIT/L (ref 40–150)
ALT SERPL-CCNC: 12 UNIT/L (ref 0–55)
ANION GAP SERPL CALC-SCNC: 9 MEQ/L
AST SERPL-CCNC: 18 UNIT/L (ref 5–34)
BASOPHILS # BLD AUTO: 0.03 X10(3)/MCL
BASOPHILS NFR BLD AUTO: 0.6 %
BILIRUB SERPL-MCNC: 0.3 MG/DL
BUN SERPL-MCNC: 19.6 MG/DL (ref 9.8–20.1)
CALCIUM SERPL-MCNC: 10.1 MG/DL (ref 8.4–10.2)
CHLORIDE SERPL-SCNC: 106 MMOL/L (ref 98–107)
CO2 SERPL-SCNC: 25 MMOL/L (ref 23–31)
CREAT SERPL-MCNC: 1.47 MG/DL (ref 0.55–1.02)
CREAT/UREA NIT SERPL: 13
EOSINOPHIL # BLD AUTO: 0.48 X10(3)/MCL (ref 0–0.9)
EOSINOPHIL NFR BLD AUTO: 9.7 %
ERYTHROCYTE [DISTWIDTH] IN BLOOD BY AUTOMATED COUNT: 14.4 % (ref 11.5–17)
GFR SERPLBLD CREATININE-BSD FMLA CKD-EPI: 36 ML/MIN/1.73/M2
GLOBULIN SER-MCNC: 3.2 GM/DL (ref 2.4–3.5)
GLUCOSE SERPL-MCNC: 101 MG/DL (ref 82–115)
HCT VFR BLD AUTO: 38 % (ref 37–47)
HGB BLD-MCNC: 12.1 G/DL (ref 12–16)
IMM GRANULOCYTES # BLD AUTO: 0.02 X10(3)/MCL (ref 0–0.04)
IMM GRANULOCYTES NFR BLD AUTO: 0.4 %
LYMPHOCYTES # BLD AUTO: 1.2 X10(3)/MCL (ref 0.6–4.6)
LYMPHOCYTES NFR BLD AUTO: 24.3 %
MCH RBC QN AUTO: 26.9 PG (ref 27–31)
MCHC RBC AUTO-ENTMCNC: 31.8 G/DL (ref 33–36)
MCV RBC AUTO: 84.4 FL (ref 80–94)
MONOCYTES # BLD AUTO: 0.45 X10(3)/MCL (ref 0.1–1.3)
MONOCYTES NFR BLD AUTO: 9.1 %
NEUTROPHILS # BLD AUTO: 2.75 X10(3)/MCL (ref 2.1–9.2)
NEUTROPHILS NFR BLD AUTO: 55.9 %
NRBC BLD AUTO-RTO: 0 %
PLATELET # BLD AUTO: 135 X10(3)/MCL (ref 130–400)
PLATELETS.RETICULATED NFR BLD AUTO: 5.5 % (ref 0.9–11.2)
PMV BLD AUTO: 11.6 FL (ref 7.4–10.4)
POTASSIUM SERPL-SCNC: 3.7 MMOL/L (ref 3.5–5.1)
PROT SERPL-MCNC: 7.2 GM/DL (ref 5.8–7.6)
RBC # BLD AUTO: 4.5 X10(6)/MCL (ref 4.2–5.4)
SODIUM SERPL-SCNC: 140 MMOL/L (ref 136–145)
TSH SERPL-ACNC: 3.41 UIU/ML (ref 0.35–4.94)
WBC # BLD AUTO: 4.93 X10(3)/MCL (ref 4.5–11.5)

## 2024-07-10 PROCEDURE — 1126F AMNT PAIN NOTED NONE PRSNT: CPT | Mod: CPTII,,, | Performed by: NURSE PRACTITIONER

## 2024-07-10 PROCEDURE — 36415 COLL VENOUS BLD VENIPUNCTURE: CPT | Performed by: NURSE PRACTITIONER

## 2024-07-10 PROCEDURE — 3074F SYST BP LT 130 MM HG: CPT | Mod: CPTII,,, | Performed by: NURSE PRACTITIONER

## 2024-07-10 PROCEDURE — 80053 COMPREHEN METABOLIC PANEL: CPT | Performed by: NURSE PRACTITIONER

## 2024-07-10 PROCEDURE — 1159F MED LIST DOCD IN RCRD: CPT | Mod: CPTII,,, | Performed by: NURSE PRACTITIONER

## 2024-07-10 PROCEDURE — 3078F DIAST BP <80 MM HG: CPT | Mod: CPTII,,, | Performed by: NURSE PRACTITIONER

## 2024-07-10 PROCEDURE — 3288F FALL RISK ASSESSMENT DOCD: CPT | Mod: CPTII,,, | Performed by: NURSE PRACTITIONER

## 2024-07-10 PROCEDURE — 1160F RVW MEDS BY RX/DR IN RCRD: CPT | Mod: CPTII,,, | Performed by: NURSE PRACTITIONER

## 2024-07-10 PROCEDURE — 1101F PT FALLS ASSESS-DOCD LE1/YR: CPT | Mod: CPTII,,, | Performed by: NURSE PRACTITIONER

## 2024-07-10 PROCEDURE — 85025 COMPLETE CBC W/AUTO DIFF WBC: CPT | Performed by: NURSE PRACTITIONER

## 2024-07-10 PROCEDURE — 99215 OFFICE O/P EST HI 40 MIN: CPT | Mod: ,,, | Performed by: NURSE PRACTITIONER

## 2024-07-10 PROCEDURE — 84443 ASSAY THYROID STIM HORMONE: CPT | Performed by: NURSE PRACTITIONER

## 2024-07-10 RX ORDER — SERTRALINE HYDROCHLORIDE 100 MG/1
100 TABLET, FILM COATED ORAL DAILY
COMMUNITY
Start: 2024-07-05

## 2024-07-10 RX ORDER — CLOTRIMAZOLE AND BETAMETHASONE DIPROPIONATE 10; .64 MG/G; MG/G
CREAM TOPICAL 2 TIMES DAILY
Qty: 15 G | Refills: 0 | Status: SHIPPED | OUTPATIENT
Start: 2024-07-10

## 2024-07-10 NOTE — ASSESSMENT & PLAN NOTE
Labwork today.  Will need to either give liquid medications or crush pills/mix with yogurt/apple sauce  Discussed progression of Alzheimer's disease. Does not wish to return to [facility] Clinic  Discussed need for higher level of care like memory unit with [facility] or [facility] - daughter will look into this today.  Palliative Care Clinic Referral for symptom management?

## 2024-07-10 NOTE — PROGRESS NOTES
Internal Medicine    Patient ID: [id number]     Chief Complaint: Weight Loss (Not eating, not taking medicine )    HPI:     Grace Allen is a 79 y.o. female here today for a problem visit. Accompanied by daughter. Living with daughter and attends adult day care daily. She is not taking medications daily - spits pills out. Complaining of weight loss of about 15# and decreased appetite over the past six months. Got new dentures recently and noticed a decline in oral intake - had follow up and fit is proper. Does not want to bathe regularly. Increasingly agitated. No other complaints today.     Past Medical History:   Diagnosis Date    Alzheimer's dementia 08/19/2022    CAD (coronary artery disease)     Dementia     Dyslipidemia     GERD (gastroesophageal reflux disease)     Hyperlipidemia     Hypertension         Past Surgical History:   Procedure Laterality Date    CHOLECYSTECTOMY      HYSTERECTOMY      triple bypass          Social History     Tobacco Use    Smoking status: Never     Passive exposure: Never    Smokeless tobacco: Never   Substance and Sexual Activity    Alcohol use: Not Currently    Drug use: Never    Sexual activity: Not Currently        Current Outpatient Medications   Medication Instructions    aspirin (ECOTRIN) 81 mg, Oral, Daily    cetirizine (ZYRTEC) 10 mg, Oral, As needed (PRN)    clotrimazole-betamethasone 1-0.05% (LOTRISONE) cream 2 times daily, Apply to affected area    clotrimazole-betamethasone 1-0.05% (LOTRISONE) cream Topical (Top), 2 times daily, Apply to affected area    docusate sodium (COLACE) 50 mg, Oral, 2 times daily PRN    ergocalciferol (VITAMIN D2) 50,000 Units, Every 7 days    EScitalopram oxalate (LEXAPRO) 20 MG tablet TAKE 1/2 TABLET BY MOUTH DAILY FOR A WEEK THEN 1 TABLET BY MOUTH DAILY THEREAFTER    ferrous gluconate (FERGON) 324 mg, Oral, With breakfast    fluticasone propionate (FLONASE) 50 mcg/actuation nasal spray USE 1 SPRAY (50 MCG TOTAL) IN EACH NOSTRIL ONCE  DAILY    memantine (NAMENDA) 5 mg, Oral, 2 times daily    memantine (NAMENDA) 5 mg, Oral, 2 times daily    methocarbamoL (ROBAXIN) 500 mg, Oral, 4 times daily    metoprolol succinate (TOPROL-XL) 12.5 mg, Oral, Daily    metoprolol succinate (TOPROL-XL) 12.5 mg, Oral    pantoprazole (PROTONIX) 40 mg, Oral, Daily    polyethylene glycol (GLYCOLAX) 17 g, Oral, 2 times daily PRN    QUEtiapine (SEROQUEL) 25 mg, Oral, 2 times daily    sertraline (ZOLOFT) 100 mg, Oral, Daily    vitamin D (VITAMIN D3) 5,000 Units       Review of patient's allergies indicates:   Allergen Reactions    Adhesive tape-silicones         Patient Care Team:  Dewey Cazares MD as PCP - General (Internal Medicine)  Isabela Ambriz MD as Consulting Physician (Cardiology)     Subjective:     Review of Systems    12 point review of systems conducted, negative except as stated in the history of present illness. See HPI for details.    Objective:     Visit Vitals  /72 (BP Location: Right arm, Patient Position: Sitting, BP Method: Medium (Manual))   Pulse 76   Temp 97.2 °F (36.2 °C)   Resp 16   Ht 5' (1.524 m)   Wt 51.4 kg (113 lb 4.8 oz)   SpO2 99%   BMI 22.13 kg/m²       Physical Exam  Vitals and nursing note reviewed.   Constitutional:       General: She is not in acute distress.     Appearance: She is not ill-appearing.   HENT:      Head: Normocephalic and atraumatic.      Mouth/Throat:      Mouth: Mucous membranes are moist.      Pharynx: Oropharynx is clear.   Eyes:      General: No scleral icterus.     Extraocular Movements: Extraocular movements intact.      Conjunctiva/sclera: Conjunctivae normal.      Pupils: Pupils are equal, round, and reactive to light.   Neck:      Vascular: No carotid bruit.   Cardiovascular:      Rate and Rhythm: Normal rate and regular rhythm.      Heart sounds: No murmur heard.     No friction rub. No gallop.   Pulmonary:      Effort: Pulmonary effort is normal. No respiratory distress.      Breath sounds:  Normal breath sounds. No wheezing, rhonchi or rales.   Abdominal:      General: Abdomen is flat. Bowel sounds are normal. There is no distension.      Palpations: Abdomen is soft. There is no mass.      Tenderness: There is no abdominal tenderness.   Musculoskeletal:         General: Normal range of motion.      Cervical back: Normal range of motion and neck supple.   Skin:     General: Skin is warm and dry.   Neurological:      Mental Status: She is alert.   Psychiatric:         Mood and Affect: Mood and affect normal.         Behavior: Behavior is agitated.         Cognition and Memory: Memory is impaired.       Assessment:       ICD-10-CM ICD-9-CM   1. Weight loss  R63.4 783.21   2. Alzheimer disease  G30.9 331.0    F02.80    3. Yeast infection  B37.9 112.9        Plan:     1. Weight loss  -     Cancel: CBC Auto Differential; Future; Expected date: 07/10/2024  -     Cancel: Comprehensive Metabolic Panel; Future; Expected date: 07/10/2024  -     Cancel: TSH; Future; Expected date: 07/10/2024  -     Cancel: Urinalysis; Future; Expected date: 07/10/2024  -     CBC Auto Differential; Future; Expected date: 07/10/2024  -     Comprehensive Metabolic Panel; Future; Expected date: 07/10/2024  -     TSH; Future; Expected date: 07/10/2024  -     Urinalysis; Future; Expected date: 07/10/2024    2. Alzheimer disease  Assessment & Plan:  Labwork today.  Will need to either give liquid medications or crush pills/mix with yogurt/apple sauce  Discussed progression of Alzheimer's disease. Does not wish to return to Neuro Clinic  Discussed need for higher level of care like memory unit with [facility] or [facility] - daughter will look into this today.  Palliative Care Clinic Referral for symptom management?      3. Yeast infection  -     clotrimazole-betamethasone 1-0.05% (LOTRISONE) cream; Apply topically 2 (two) times daily. Apply to affected area  Dispense: 15 g; Refill: 0         Follow up in about 1 month (around 8/10/2024)  for Routine Follow Up. In addition to their scheduled follow up, the patient has also been instructed to follow up on as needed basis.     Future Appointments   Date Time Provider Department Center   8/12/2024  3:40 PM Dewey Cazares MD [id number]   8/19/2024  3:20 PM Dewey Cazares MD [id number]        [facility] Franco    E/M Level Based On Time:   10 minutes spent on reviewing chart, which includes interpreting lab results and diagnostic tests.   20 minutes spent in the room with the patient performing a history and physical exam.   10 minutes spent counseling or educating the patient/caregiver.   5 minutes spent prescribing medications, ordering labwork/diagnostic tests, or placing referrals.   10 minutes spent collaborating plan of care with physician.   5 minutes spent documenting all relevant clinical informationin the electronic health record.     Total Time Spent: 60 minutes     E/M Level:  25405 / 60-74 minutes

## 2024-07-11 ENCOUNTER — TELEPHONE (OUTPATIENT)
Dept: INTERNAL MEDICINE | Facility: CLINIC | Age: 79
End: 2024-07-11
Payer: COMMERCIAL

## 2024-07-11 DIAGNOSIS — R30.0 DYSURIA: ICD-10-CM

## 2024-07-11 DIAGNOSIS — G30.9 ALZHEIMER DISEASE: Primary | Chronic | ICD-10-CM

## 2024-07-11 DIAGNOSIS — B37.9 YEAST INFECTION: Primary | ICD-10-CM

## 2024-07-11 DIAGNOSIS — F02.80 ALZHEIMER DISEASE: Primary | Chronic | ICD-10-CM

## 2024-07-11 LAB
BACTERIA #/AREA URNS AUTO: ABNORMAL /HPF
BILIRUB UR QL STRIP.AUTO: NEGATIVE
CAOX CRY UR QL COMP ASSIST: ABNORMAL
CLARITY UR: CLEAR
COLOR UR AUTO: ABNORMAL
GLUCOSE UR QL STRIP: NORMAL
HGB UR QL STRIP: NEGATIVE
HYALINE CASTS #/AREA URNS LPF: ABNORMAL /LPF
KETONES UR QL STRIP: NEGATIVE
LEUKOCYTE ESTERASE UR QL STRIP: 75
MUCOUS THREADS URNS QL MICRO: ABNORMAL /LPF
NITRITE UR QL STRIP: NEGATIVE
PH UR STRIP: 5.5 [PH]
PROT UR QL STRIP: NEGATIVE
RBC #/AREA URNS AUTO: ABNORMAL /HPF
SP GR UR STRIP.AUTO: 1.02 (ref 1–1.03)
SQUAMOUS #/AREA URNS LPF: ABNORMAL /HPF
UROBILINOGEN UR STRIP-ACNC: NORMAL
WBC #/AREA URNS AUTO: ABNORMAL /HPF

## 2024-07-11 PROCEDURE — 81001 URINALYSIS AUTO W/SCOPE: CPT | Performed by: NURSE PRACTITIONER

## 2024-07-11 PROCEDURE — 87086 URINE CULTURE/COLONY COUNT: CPT | Performed by: INTERNAL MEDICINE

## 2024-07-11 NOTE — TELEPHONE ENCOUNTER
----- Message from ERYN Chand sent at 7/11/2024  3:46 PM CDT -----  Please inform patient of results.    1. Small amount of bacteria and trace blood. Did did this reflex to culture?    Thanks for all you do,    Jordan

## 2024-07-11 NOTE — PROGRESS NOTES
Please inform patient of results.    1. Small amount of bacteria and trace blood. Did did this reflex to culture?    Thanks for all you do,    Jordan

## 2024-07-11 NOTE — TELEPHONE ENCOUNTER
Pt daughter informed of results . Pt daughter ask if we can send the referral to a Palliative Care Clinic

## 2024-07-11 NOTE — PROGRESS NOTES
Please inform patient of results.    1. Labwork OK     2. I would recommend trying to crush her medications and administer with applesauce or yogurt before we make the change to suspension. Did her daughter check into memory care units? We can also send to Palliative Care Clinic to assist with symptom control and see if any other resources available for her?    Thanks for all you do,    Jordan

## 2024-07-11 NOTE — TELEPHONE ENCOUNTER
----- Message from ERYN Chand sent at 7/11/2024  8:14 AM CDT -----  Please inform patient of results.    1. Labwork OK     2. I would recommend trying to crush her medications and administer with applesauce or yogurt before we make the change to suspension. Did her daughter check into memory care units? We can also send to [facility] to assist with symptom control and see if any other resources available for her?    Thanks for all you do,    Jordan

## 2024-07-13 LAB — BACTERIA UR CULT: NORMAL

## 2024-07-25 ENCOUNTER — TELEPHONE (OUTPATIENT)
Dept: INTERNAL MEDICINE | Facility: CLINIC | Age: 79
End: 2024-07-25
Payer: MEDICAID

## 2024-07-25 NOTE — TELEPHONE ENCOUNTER
----- Message from Alberto Maher sent at 7/25/2024  8:28 AM CDT -----  .Who Called: Barbie Hastings    Caller is requesting assistance/information from provider's office.    Symptoms (please be specific): appetite   How long has patient had these symptoms:  n/a  List of preferred pharmacies on file (remove unneeded): [unfilled]  If different, enter pharmacy into here including location and phone number: n/a      Preferred Method of Contact: Phone Call  Patient's Preferred Phone Number on File: [phone]   Best Call Back Number, if different:  Additional Information: called wanting to speak with nurse about a medication for pt's appetite

## 2024-07-26 ENCOUNTER — HOSPITAL ENCOUNTER (EMERGENCY)
Facility: HOSPITAL | Age: 79
Discharge: LEFT BEFORE DISCHARGE | End: 2024-07-26
Payer: MEDICARE

## 2024-07-26 VITALS
OXYGEN SATURATION: 98 % | BODY MASS INDEX: 20.8 KG/M2 | TEMPERATURE: 97 F | WEIGHT: 113 LBS | SYSTOLIC BLOOD PRESSURE: 112 MMHG | HEART RATE: 78 BPM | RESPIRATION RATE: 17 BRPM | HEIGHT: 62 IN | DIASTOLIC BLOOD PRESSURE: 84 MMHG

## 2024-07-26 LAB
ALBUMIN SERPL-MCNC: 4.1 G/DL (ref 3.4–4.8)
ALBUMIN/GLOB SERPL: 1.2 RATIO (ref 1.1–2)
ALP SERPL-CCNC: 71 UNIT/L (ref 40–150)
ALT SERPL-CCNC: 19 UNIT/L (ref 0–55)
ANION GAP SERPL CALC-SCNC: 13 MEQ/L
AST SERPL-CCNC: 25 UNIT/L (ref 5–34)
BASOPHILS # BLD AUTO: 0.04 X10(3)/MCL
BASOPHILS NFR BLD AUTO: 0.7 %
BILIRUB SERPL-MCNC: 0.4 MG/DL
BUN SERPL-MCNC: 31.8 MG/DL (ref 9.8–20.1)
CALCIUM SERPL-MCNC: 10.6 MG/DL (ref 8.4–10.2)
CHLORIDE SERPL-SCNC: 105 MMOL/L (ref 98–107)
CO2 SERPL-SCNC: 22 MMOL/L (ref 23–31)
CREAT SERPL-MCNC: 1.56 MG/DL (ref 0.55–1.02)
CREAT/UREA NIT SERPL: 20
EOSINOPHIL # BLD AUTO: 0.42 X10(3)/MCL (ref 0–0.9)
EOSINOPHIL NFR BLD AUTO: 7.7 %
ERYTHROCYTE [DISTWIDTH] IN BLOOD BY AUTOMATED COUNT: 14.7 % (ref 11.5–17)
GFR SERPLBLD CREATININE-BSD FMLA CKD-EPI: 33 ML/MIN/1.73/M2
GLOBULIN SER-MCNC: 3.3 GM/DL (ref 2.4–3.5)
GLUCOSE SERPL-MCNC: 90 MG/DL (ref 82–115)
HCT VFR BLD AUTO: 36.6 % (ref 37–47)
HGB BLD-MCNC: 11.4 G/DL (ref 12–16)
IMM GRANULOCYTES # BLD AUTO: 0.02 X10(3)/MCL (ref 0–0.04)
IMM GRANULOCYTES NFR BLD AUTO: 0.4 %
LYMPHOCYTES # BLD AUTO: 1.8 X10(3)/MCL (ref 0.6–4.6)
LYMPHOCYTES NFR BLD AUTO: 33.2 %
MCH RBC QN AUTO: 26.3 PG (ref 27–31)
MCHC RBC AUTO-ENTMCNC: 31.1 G/DL (ref 33–36)
MCV RBC AUTO: 84.3 FL (ref 80–94)
MONOCYTES # BLD AUTO: 0.51 X10(3)/MCL (ref 0.1–1.3)
MONOCYTES NFR BLD AUTO: 9.4 %
NEUTROPHILS # BLD AUTO: 2.63 X10(3)/MCL (ref 2.1–9.2)
NEUTROPHILS NFR BLD AUTO: 48.6 %
NRBC BLD AUTO-RTO: 0 %
PLATELET # BLD AUTO: 156 X10(3)/MCL (ref 130–400)
PMV BLD AUTO: 11.2 FL (ref 7.4–10.4)
POTASSIUM SERPL-SCNC: 3.6 MMOL/L (ref 3.5–5.1)
PROT SERPL-MCNC: 7.4 GM/DL (ref 5.8–7.6)
RBC # BLD AUTO: 4.34 X10(6)/MCL (ref 4.2–5.4)
SODIUM SERPL-SCNC: 140 MMOL/L (ref 136–145)
WBC # BLD AUTO: 5.42 X10(3)/MCL (ref 4.5–11.5)

## 2024-07-26 PROCEDURE — 99283 EMERGENCY DEPT VISIT LOW MDM: CPT

## 2024-07-26 PROCEDURE — 80053 COMPREHEN METABOLIC PANEL: CPT | Performed by: PHYSICIAN ASSISTANT

## 2024-07-26 PROCEDURE — 85025 COMPLETE CBC W/AUTO DIFF WBC: CPT | Performed by: PHYSICIAN ASSISTANT

## 2024-07-26 NOTE — TELEPHONE ENCOUNTER
Spoke to pt's daughter who stated that pt has not eaten in about a week. I let ERYN Franco know this and she stated to have pt go to ED for fluids, and treatment. Pt's daughter stated that she is going to bring her to [facility]. Pt's daughter also stated that she is not happy with Palliative care because she is not happy with someone coming in to help. She stated that she would like to speak with Dr. Fernandez about it directly before moving forward with Palliative care.

## 2024-07-26 NOTE — TELEPHONE ENCOUNTER
Spoke to pt's daughter who stated that she believes her mother is not ready for hospice at his time, and needs time to think about it. She is still going to bring pt to ED.

## 2024-07-26 NOTE — TELEPHONE ENCOUNTER
Dewey Cazares MD [place], [place], [place]  Caller: Unspecified (Yesterday, 10:17 AM)  So palliative care is just home health does not directly assist with anyone coming into the home I think she missed understood.  Hospice service is what she needs- this would offer the patient the biggest assistance in the home. I recommend that we consult Hospice today-

## 2024-07-27 ENCOUNTER — HOSPITAL ENCOUNTER (EMERGENCY)
Facility: HOSPITAL | Age: 79
Discharge: HOME | End: 2024-07-27
Attending: EMERGENCY MEDICINE
Payer: MEDICARE

## 2024-07-27 VITALS
DIASTOLIC BLOOD PRESSURE: 69 MMHG | HEART RATE: 65 BPM | TEMPERATURE: 98 F | OXYGEN SATURATION: 100 % | SYSTOLIC BLOOD PRESSURE: 119 MMHG | RESPIRATION RATE: 16 BRPM

## 2024-07-27 DIAGNOSIS — R63.0 ANOREXIA: ICD-10-CM

## 2024-07-27 DIAGNOSIS — E86.0 DEHYDRATION: Primary | ICD-10-CM

## 2024-07-27 DIAGNOSIS — F03.90 DEMENTIA, UNSPECIFIED DEMENTIA SEVERITY, UNSPECIFIED DEMENTIA TYPE, UNSPECIFIED WHETHER BEHAVIORAL, PSYCHOTIC, OR MOOD DISTURBANCE OR ANXIETY: ICD-10-CM

## 2024-07-27 DIAGNOSIS — R07.9 CHEST PAIN: ICD-10-CM

## 2024-07-27 LAB
ALBUMIN SERPL-MCNC: 4 G/DL (ref 3.4–4.8)
ALBUMIN/GLOB SERPL: 1.3 RATIO (ref 1.1–2)
ALP SERPL-CCNC: 67 UNIT/L (ref 40–150)
ALT SERPL-CCNC: 16 UNIT/L (ref 0–55)
ANION GAP SERPL CALC-SCNC: 10 MEQ/L
AST SERPL-CCNC: 22 UNIT/L (ref 5–34)
BACTERIA #/AREA URNS AUTO: ABNORMAL /HPF
BASOPHILS # BLD AUTO: 0.03 X10(3)/MCL
BASOPHILS NFR BLD AUTO: 0.6 %
BILIRUB SERPL-MCNC: 0.5 MG/DL
BILIRUB UR QL STRIP.AUTO: NEGATIVE
BNP BLD-MCNC: 29.5 PG/ML
BUN SERPL-MCNC: 26.6 MG/DL (ref 9.8–20.1)
CALCIUM SERPL-MCNC: 10.6 MG/DL (ref 8.4–10.2)
CHLORIDE SERPL-SCNC: 107 MMOL/L (ref 98–107)
CLARITY UR: CLEAR
CO2 SERPL-SCNC: 22 MMOL/L (ref 23–31)
COLOR UR AUTO: YELLOW
CREAT SERPL-MCNC: 1.47 MG/DL (ref 0.55–1.02)
CREAT/UREA NIT SERPL: 18
EOSINOPHIL # BLD AUTO: 0.44 X10(3)/MCL (ref 0–0.9)
EOSINOPHIL NFR BLD AUTO: 8.6 %
ERYTHROCYTE [DISTWIDTH] IN BLOOD BY AUTOMATED COUNT: 14.7 % (ref 11.5–17)
GFR SERPLBLD CREATININE-BSD FMLA CKD-EPI: 36 ML/MIN/1.73/M2
GLOBULIN SER-MCNC: 3.1 GM/DL (ref 2.4–3.5)
GLUCOSE SERPL-MCNC: 101 MG/DL (ref 82–115)
GLUCOSE UR QL STRIP: NORMAL
HCT VFR BLD AUTO: 35.1 % (ref 37–47)
HGB BLD-MCNC: 11.1 G/DL (ref 12–16)
HGB UR QL STRIP: ABNORMAL
HYALINE CASTS #/AREA URNS LPF: ABNORMAL /LPF
IMM GRANULOCYTES # BLD AUTO: 0.01 X10(3)/MCL (ref 0–0.04)
IMM GRANULOCYTES NFR BLD AUTO: 0.2 %
INR PPP: 1.1
KETONES UR QL STRIP: ABNORMAL
LEUKOCYTE ESTERASE UR QL STRIP: NEGATIVE
LYMPHOCYTES # BLD AUTO: 0.75 X10(3)/MCL (ref 0.6–4.6)
LYMPHOCYTES NFR BLD AUTO: 14.7 %
MCH RBC QN AUTO: 26.7 PG (ref 27–31)
MCHC RBC AUTO-ENTMCNC: 31.6 G/DL (ref 33–36)
MCV RBC AUTO: 84.4 FL (ref 80–94)
MONOCYTES # BLD AUTO: 0.37 X10(3)/MCL (ref 0.1–1.3)
MONOCYTES NFR BLD AUTO: 7.3 %
MUCOUS THREADS URNS QL MICRO: ABNORMAL /LPF
NEUTROPHILS # BLD AUTO: 3.5 X10(3)/MCL (ref 2.1–9.2)
NEUTROPHILS NFR BLD AUTO: 68.6 %
NITRITE UR QL STRIP: NEGATIVE
NRBC BLD AUTO-RTO: 0 %
OHS QRS DURATION: 134 MS
OHS QTC CALCULATION: 491 MS
PH UR STRIP: 5.5 [PH]
PLATELET # BLD AUTO: 158 X10(3)/MCL (ref 130–400)
PMV BLD AUTO: 11.3 FL (ref 7.4–10.4)
POTASSIUM SERPL-SCNC: 3.7 MMOL/L (ref 3.5–5.1)
PROT SERPL-MCNC: 7.1 GM/DL (ref 5.8–7.6)
PROT UR QL STRIP: ABNORMAL
PROTHROMBIN TIME: 14.3 SECONDS (ref 12.5–14.5)
RBC # BLD AUTO: 4.16 X10(6)/MCL (ref 4.2–5.4)
RBC #/AREA URNS AUTO: ABNORMAL /HPF
SODIUM SERPL-SCNC: 139 MMOL/L (ref 136–145)
SP GR UR STRIP.AUTO: 1.02 (ref 1–1.03)
SQUAMOUS #/AREA URNS LPF: ABNORMAL /HPF
TROPONIN I SERPL-MCNC: <0.01 NG/ML (ref 0–0.04)
UROBILINOGEN UR STRIP-ACNC: NORMAL
WBC # BLD AUTO: 5.1 X10(3)/MCL (ref 4.5–11.5)
WBC #/AREA URNS AUTO: ABNORMAL /HPF

## 2024-07-27 PROCEDURE — 81001 URINALYSIS AUTO W/SCOPE: CPT | Performed by: EMERGENCY MEDICINE

## 2024-07-27 PROCEDURE — 85025 COMPLETE CBC W/AUTO DIFF WBC: CPT

## 2024-07-27 PROCEDURE — 99285 EMERGENCY DEPT VISIT HI MDM: CPT | Mod: 25

## 2024-07-27 PROCEDURE — 93005 ELECTROCARDIOGRAM TRACING: CPT

## 2024-07-27 PROCEDURE — 83880 ASSAY OF NATRIURETIC PEPTIDE: CPT

## 2024-07-27 PROCEDURE — 85610 PROTHROMBIN TIME: CPT

## 2024-07-27 PROCEDURE — 25000003 PHARM REV CODE 250: Performed by: EMERGENCY MEDICINE

## 2024-07-27 PROCEDURE — 84484 ASSAY OF TROPONIN QUANT: CPT

## 2024-07-27 PROCEDURE — 80053 COMPREHEN METABOLIC PANEL: CPT

## 2024-07-27 RX ORDER — QUETIAPINE FUMARATE 25 MG/1
25 TABLET, FILM COATED ORAL
Status: DISCONTINUED | OUTPATIENT
Start: 2024-07-27 | End: 2024-07-27 | Stop reason: HOSPADM

## 2024-07-27 RX ORDER — MEGESTROL ACETATE 40 MG/1
40 TABLET ORAL DAILY
Qty: 30 TABLET | Refills: 11 | Status: SHIPPED | OUTPATIENT
Start: 2024-07-27 | End: 2025-07-27

## 2024-07-27 RX ORDER — HYDROXYZINE PAMOATE 25 MG/1
25 CAPSULE ORAL
Status: DISCONTINUED | OUTPATIENT
Start: 2024-07-27 | End: 2024-07-27 | Stop reason: HOSPADM

## 2024-07-27 RX ADMIN — SODIUM CHLORIDE 1000 ML: 9 INJECTION, SOLUTION INTRAVENOUS at 03:07

## 2024-07-29 DIAGNOSIS — E53.8 FOLIC ACID DEFICIENCY: ICD-10-CM

## 2024-07-29 DIAGNOSIS — I10 PRIMARY HYPERTENSION: Primary | ICD-10-CM

## 2024-07-29 DIAGNOSIS — E78.5 DYSLIPIDEMIA: ICD-10-CM

## 2024-07-29 DIAGNOSIS — E53.8 B12 DEFICIENCY: ICD-10-CM

## 2024-07-29 DIAGNOSIS — E55.9 VITAMIN D DEFICIENCY: ICD-10-CM

## 2024-07-29 DIAGNOSIS — D50.8 IRON DEFICIENCY ANEMIA SECONDARY TO INADEQUATE DIETARY IRON INTAKE: ICD-10-CM

## 2024-07-31 ENCOUNTER — OFFICE VISIT (OUTPATIENT)
Dept: INTERNAL MEDICINE | Facility: CLINIC | Age: 79
End: 2024-07-31
Payer: MEDICARE

## 2024-07-31 VITALS
HEART RATE: 68 BPM | WEIGHT: 113 LBS | TEMPERATURE: 98 F | HEIGHT: 62 IN | BODY MASS INDEX: 20.8 KG/M2 | DIASTOLIC BLOOD PRESSURE: 76 MMHG | RESPIRATION RATE: 16 BRPM | SYSTOLIC BLOOD PRESSURE: 124 MMHG | OXYGEN SATURATION: 96 %

## 2024-07-31 DIAGNOSIS — G30.9 ALZHEIMER DISEASE: Chronic | ICD-10-CM

## 2024-07-31 DIAGNOSIS — F02.80 ALZHEIMER DISEASE: Chronic | ICD-10-CM

## 2024-07-31 DIAGNOSIS — F02.C2 SEVERE LATE ONSET ALZHEIMER'S DEMENTIA WITH PSYCHOTIC DISTURBANCE: Primary | ICD-10-CM

## 2024-07-31 DIAGNOSIS — G30.1 SEVERE LATE ONSET ALZHEIMER'S DEMENTIA WITH PSYCHOTIC DISTURBANCE: Primary | ICD-10-CM

## 2024-07-31 PROCEDURE — 1126F AMNT PAIN NOTED NONE PRSNT: CPT | Mod: CPTII,,, | Performed by: INTERNAL MEDICINE

## 2024-07-31 PROCEDURE — 1160F RVW MEDS BY RX/DR IN RCRD: CPT | Mod: CPTII,,, | Performed by: INTERNAL MEDICINE

## 2024-07-31 PROCEDURE — 3288F FALL RISK ASSESSMENT DOCD: CPT | Mod: CPTII,,, | Performed by: INTERNAL MEDICINE

## 2024-07-31 PROCEDURE — 1159F MED LIST DOCD IN RCRD: CPT | Mod: CPTII,,, | Performed by: INTERNAL MEDICINE

## 2024-07-31 PROCEDURE — 99214 OFFICE O/P EST MOD 30 MIN: CPT | Mod: ,,, | Performed by: INTERNAL MEDICINE

## 2024-07-31 PROCEDURE — 3078F DIAST BP <80 MM HG: CPT | Mod: CPTII,,, | Performed by: INTERNAL MEDICINE

## 2024-07-31 PROCEDURE — 1101F PT FALLS ASSESS-DOCD LE1/YR: CPT | Mod: CPTII,,, | Performed by: INTERNAL MEDICINE

## 2024-07-31 PROCEDURE — 3074F SYST BP LT 130 MM HG: CPT | Mod: CPTII,,, | Performed by: INTERNAL MEDICINE

## 2024-07-31 NOTE — ASSESSMENT & PLAN NOTE
Patient with dementia with likely etiology of alzheimer's dementia. Dementia is severe.   The patient does have signs of behavioral disturbance.  Continues to lose weight currently at 113lb  today at her last visit she was 130 lb  She has evidence of temporal wasting and supaclavicular wasting  Tangential and talkative, has a hard time keeping still  Consult to hospice services given ongoing cognitive decline, patient now refusing medications and not eating.

## 2024-07-31 NOTE — PROGRESS NOTES
Internal Medicine    Patient ID: [id number]     Chief Complaint: No chief complaint on file.      HPI:     Grace Allen is a 79 y.o. female here today for a follow up.   Progressive cognitive decline known history of dementia; disease progression.  Daughter was forcing her to take her pills this morning.  Ongoing conversations with her daughter about nursing home placement and hospice in the past but has been resistant.  Patient is angry today.  Had to go to the ER to get fluids because she has not been eating.  Family amenable to hospice services at this time.  Will need nursing home placement  Past Medical History:   Diagnosis Date    Alzheimer's dementia 08/19/2022    CAD (coronary artery disease)     Dementia     Dyslipidemia     GERD (gastroesophageal reflux disease)     Hyperlipidemia     Hypertension         Past Surgical History:   Procedure Laterality Date    CHOLECYSTECTOMY      HYSTERECTOMY      triple bypass          Social History     Tobacco Use    Smoking status: Never     Passive exposure: Never    Smokeless tobacco: Never   Substance and Sexual Activity    Alcohol use: Not Currently    Drug use: Never    Sexual activity: Not Currently        Current Outpatient Medications   Medication Instructions    aspirin (ECOTRIN) 81 mg, Daily    cetirizine (ZYRTEC) 10 mg, As needed (PRN)    clotrimazole-betamethasone 1-0.05% (LOTRISONE) cream 2 times daily, Apply to affected area    clotrimazole-betamethasone 1-0.05% (LOTRISONE) cream Topical (Top), 2 times daily, Apply to affected area    docusate sodium (COLACE) 50 mg, Oral, 2 times daily PRN    ergocalciferol (VITAMIN D2) 50,000 Units, Every 7 days    EScitalopram oxalate (LEXAPRO) 20 MG tablet TAKE 1/2 TABLET BY MOUTH DAILY FOR A WEEK THEN 1 TABLET BY MOUTH DAILY THEREAFTER    ferrous gluconate (FERGON) 324 mg, Oral, With breakfast    fluticasone propionate (FLONASE) 50 mcg/actuation nasal spray USE 1 SPRAY (50 MCG TOTAL) IN EACH NOSTRIL ONCE DAILY     "megestroL (MEGACE) 40 mg, Oral, Daily    memantine (NAMENDA) 5 mg, Oral, 2 times daily    memantine (NAMENDA) 5 mg, 2 times daily    methocarbamoL (ROBAXIN) 500 mg, 4 times daily    metoprolol succinate (TOPROL-XL) 12.5 mg, Daily    metoprolol succinate (TOPROL-XL) 12.5 mg    pantoprazole (PROTONIX) 40 mg, Daily    polyethylene glycol (GLYCOLAX) 17 g, Oral, 2 times daily PRN    QUEtiapine (SEROQUEL) 25 mg, Oral, 2 times daily    sertraline (ZOLOFT) 100 mg, Daily    vitamin D (VITAMIN D3) 5,000 Units       Review of patient's allergies indicates:   Allergen Reactions    Adhesive tape-silicones         Patient Care Team:  Dewey Cazares MD as PCP - General (Internal Medicine)  Isabela Ambriz MD as Consulting Physician (Cardiology)     Subjective:     Review of Systems    12 point review of systems conducted, negative except as stated in the history of present illness. See HPI for details.    Objective:     Visit Vitals  /76 (BP Location: Right arm, Patient Position: Sitting, BP Method: Medium (Manual))   Pulse 68   Temp 97.8 °F (36.6 °C) (Temporal)   Resp 16   Ht 5' 2" (1.575 m)   Wt 51.3 kg (113 lb)   SpO2 96%   BMI 20.67 kg/m²       Physical Exam  Constitutional:       Comments: Chronically ill appearing, cachectic   HENT:      Head: Normocephalic and atraumatic.   Eyes:      Extraocular Movements: Extraocular movements intact.      Pupils: Pupils are equal, round, and reactive to light.   Cardiovascular:      Rate and Rhythm: Normal rate and regular rhythm.   Pulmonary:      Effort: Pulmonary effort is normal.      Breath sounds: Normal breath sounds.   Skin:     General: Skin is warm and dry.   Neurological:      Mental Status: She is alert.      Comments: She is not oriented she is tangential and talkative; word utterances / not making complete sentences         Labs Reviewed:     Chemistry:  Lab Results   Component Value Date     07/27/2024    K 3.7 07/27/2024    BUN 26.6 (H) 07/27/2024 "    CREATININE 1.47 (H) 07/27/2024    EGFRNORACEVR 36 07/27/2024    GLUCOSE 101 07/27/2024    CALCIUM 10.6 (H) 07/27/2024    ALKPHOS 67 07/27/2024    LABPROT 7.1 07/27/2024    LABPROT 14.3 07/27/2024    ALBUMIN 4.0 07/27/2024    BILIDIR 0.3 01/20/2021    IBILI 0.30 01/20/2021    AST 22 07/27/2024    ALT 16 07/27/2024    MG 2.10 11/16/2023    JRZVJZYL15RF 62.3 08/02/2023    TSH 3.411 07/10/2024        Lab Results   Component Value Date    HGBA1C 5.6 01/20/2021        Hematology:  Lab Results   Component Value Date    WBC 5.10 07/27/2024    HGB 11.1 (L) 07/27/2024    HCT 35.1 (L) 07/27/2024     07/27/2024       Lipid Panel:  Lab Results   Component Value Date    CHOL 130 08/02/2023    HDL 50 08/02/2023    LDL 67.00 08/02/2023    TRIG 64 08/02/2023    TOTALCHOLEST 3 08/02/2023        Urine:  Lab Results   Component Value Date    APPEARANCEUA Clear 07/27/2024    SGUA 1.021 07/27/2024    PROTEINUA Trace (A) 07/27/2024    KETONESUA Trace (A) 07/27/2024    LEUKOCYTESUR Negative 07/27/2024    RBCUA 0-5 07/27/2024    WBCUA 0-5 07/27/2024    BACTERIA None Seen 07/27/2024    SQEPUA Trace 07/27/2024    HYALINECASTS 6-10 (A) 07/27/2024        Assessment:       ICD-10-CM ICD-9-CM   1. 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 Alzheimer disease  G30.9 331.0    F02.80         Plan:     1. 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 Alzheimer disease  Assessment & Plan:  Patient with dementia with likely etiology of alzheimer's dementia. Dementia is severe.   The patient does have signs of behavioral disturbance.  Continues to lose weight currently at 113lb  today at her last visit she was 130 lb  She has evidence of temporal wasting and supaclavicular wasting  Tangential and talkative, has a hard time keeping still  Consult to hospice services given ongoing  cognitive decline, patient now refusing medications and not eating.             Follow up if symptoms worsen or fail to improve. In addition to their scheduled follow up, the patient has also been instructed to follow up on as needed basis.     Future Appointments   Date Time Provider Department Center   8/12/2024  3:40 PM Dewey Cazares MD [facility] [id number] [id number]   8/19/2024  3:20 PM [facility]ares [id number]        Dewey Cazares MD  An office visit for an established patient was performed. 10 minutes was used for reviewing the patients chart prior to the [facility] visit done on that same day. 15 minutes was used during the visit in regards to taking the patient history and physical exam. There was also an additional 5 minutes spent on education and counseling regarding medical conditions, current medications including risk/benefit and side effects/adverse events, vaccine counseling. After leaving the exam room, the provider then spent an additional 5 minutes completing the electronic health record.    The patient is receptive, expresses understanding and is agreeable to plan. All questions answered; total time spent was 35 minutes.

## 2024-08-04 DIAGNOSIS — D50.8 IRON DEFICIENCY ANEMIA SECONDARY TO INADEQUATE DIETARY IRON INTAKE: Primary | ICD-10-CM

## 2024-08-05 RX ORDER — FERROUS GLUCONATE 324(38)MG
1 TABLET ORAL
Qty: 90 TABLET | Refills: 3 | Status: SHIPPED | OUTPATIENT
Start: 2024-08-05

## 2024-08-07 ENCOUNTER — TELEPHONE (OUTPATIENT)
Dept: INTERNAL MEDICINE | Facility: CLINIC | Age: 79
End: 2024-08-07
Payer: MEDICARE

## 2024-09-07 DIAGNOSIS — F41.9 ANXIETY: ICD-10-CM

## 2024-09-07 DIAGNOSIS — G30.9 ALZHEIMER DISEASE: ICD-10-CM

## 2024-09-07 DIAGNOSIS — F02.80 ALZHEIMER DISEASE: ICD-10-CM

## 2024-09-09 RX ORDER — ESCITALOPRAM OXALATE 20 MG/1
TABLET ORAL
Qty: 30 TABLET | Refills: 1 | Status: SHIPPED | OUTPATIENT
Start: 2024-09-09

## 2024-09-11 DIAGNOSIS — F41.9 ANXIETY DISORDER, UNSPECIFIED: ICD-10-CM

## 2024-09-12 RX ORDER — QUETIAPINE FUMARATE 25 MG/1
25 TABLET, FILM COATED ORAL 2 TIMES DAILY
Qty: 180 TABLET | Refills: 2 | Status: SHIPPED | OUTPATIENT
Start: 2024-09-12

## 2024-10-12 DIAGNOSIS — F32.A DEPRESSION, UNSPECIFIED: ICD-10-CM

## 2024-10-12 DIAGNOSIS — F41.9 ANXIETY DISORDER, UNSPECIFIED: ICD-10-CM

## 2024-10-14 ENCOUNTER — TELEPHONE (OUTPATIENT)
Dept: INTERNAL MEDICINE | Facility: CLINIC | Age: 79
End: 2024-10-14
Payer: MEDICARE

## 2024-10-14 RX ORDER — SERTRALINE HYDROCHLORIDE 100 MG/1
100 TABLET, FILM COATED ORAL
Qty: 90 TABLET | Refills: 3 | Status: SHIPPED | OUTPATIENT
Start: 2024-10-14

## 2024-10-14 NOTE — TELEPHONE ENCOUNTER
----- Message from Tech Rianna sent at 10/14/2024  9:10 AM CDT -----  .Type:  Needs Medical Advice    Who Called:  [facility] ( [place])    Symptoms (please be specific):  no     How long has patient had these symptoms:   no    Pharmacy name and phone #:   no    Would the patient rather a call back or a response via [facility]?      Best Call Back Number:  [phone]    Additional Information:  no PA needed for this medication (  sertraline (ZOLOFT) 100 MG tablet 90 tablet pt can order through the pharmacy

## 2024-10-29 ENCOUNTER — TELEPHONE (OUTPATIENT)
Dept: INTERNAL MEDICINE | Facility: CLINIC | Age: 79
End: 2024-10-29
Payer: MEDICARE

## 2024-10-29 DIAGNOSIS — I10 PRIMARY HYPERTENSION: Primary | ICD-10-CM

## 2024-10-29 DIAGNOSIS — E53.8 B12 DEFICIENCY: ICD-10-CM

## 2024-10-29 DIAGNOSIS — E78.5 DYSLIPIDEMIA: ICD-10-CM

## 2024-10-29 DIAGNOSIS — D50.8 IRON DEFICIENCY ANEMIA SECONDARY TO INADEQUATE DIETARY IRON INTAKE: ICD-10-CM

## 2024-10-29 DIAGNOSIS — N18.32 STAGE 3B CHRONIC KIDNEY DISEASE: ICD-10-CM

## 2024-10-29 DIAGNOSIS — E55.9 VITAMIN D DEFICIENCY: ICD-10-CM

## 2024-10-29 DIAGNOSIS — E53.8 FOLIC ACID DEFICIENCY: ICD-10-CM

## 2024-11-09 DIAGNOSIS — J30.2 SEASONAL ALLERGIES: ICD-10-CM

## 2024-11-11 RX ORDER — FLUTICASONE PROPIONATE 50 MCG
SPRAY, SUSPENSION (ML) NASAL
Qty: 32 ML | Refills: 2 | Status: SHIPPED | OUTPATIENT
Start: 2024-11-11

## 2024-11-12 ENCOUNTER — OFFICE VISIT (OUTPATIENT)
Dept: INTERNAL MEDICINE | Facility: CLINIC | Age: 79
End: 2024-11-12
Payer: MEDICARE

## 2024-11-12 VITALS
SYSTOLIC BLOOD PRESSURE: 134 MMHG | RESPIRATION RATE: 16 BRPM | BODY MASS INDEX: 19.88 KG/M2 | HEART RATE: 81 BPM | DIASTOLIC BLOOD PRESSURE: 74 MMHG | TEMPERATURE: 98 F | HEIGHT: 62 IN | WEIGHT: 108 LBS | OXYGEN SATURATION: 99 %

## 2024-11-12 DIAGNOSIS — Z51.5 HOSPICE CARE: Primary | ICD-10-CM

## 2024-11-12 DIAGNOSIS — Z23 NEED FOR VACCINATION: ICD-10-CM

## 2024-11-12 RX ORDER — CEFDINIR 250 MG/5ML
POWDER, FOR SUSPENSION ORAL
COMMUNITY
Start: 2024-11-10

## 2024-11-12 NOTE — ASSESSMENT & PLAN NOTE
Patient with no further indications to come back to the office she is on hospice services will go ahead and give her her flu vaccine today.

## 2024-11-12 NOTE — PROGRESS NOTES
Internal Medicine    Patient ID: [id number]     Chief Complaint: Follow-up      HPI:     Grace Allen is a 79 y.o. female here today for a follow up.   Hospice patient here today with her daughter she has a advanced dementia.  I instructed the daughter that she really does not need to come here anymore her daughter is going to be having surgery and I have contacted the hospice company about setting Ms. Edwards up with respite care to allow her daughter to heal from surgery.   Past Medical History:   Diagnosis Date    Alzheimer's dementia 08/19/2022    CAD (coronary artery disease)     Dementia     Dyslipidemia     GERD (gastroesophageal reflux disease)     Hyperlipidemia     Hypertension         Past Surgical History:   Procedure Laterality Date    CHOLECYSTECTOMY      HYSTERECTOMY      triple bypass          Social History     Tobacco Use    Smoking status: Never     Passive exposure: Never    Smokeless tobacco: Never   Substance and Sexual Activity    Alcohol use: Not Currently    Drug use: Never    Sexual activity: Not Currently        Current Outpatient Medications   Medication Instructions    aspirin (ECOTRIN) 81 mg, Daily    cefdinir (OMNICEF) 250 mg/5 mL suspension SMARTSIG:Milliliter(s) By Mouth    cetirizine (ZYRTEC) 10 mg, As needed (PRN)    clotrimazole-betamethasone 1-0.05% (LOTRISONE) cream 2 times daily, Apply to affected area    clotrimazole-betamethasone 1-0.05% (LOTRISONE) cream Topical (Top), 2 times daily, Apply to affected area    docusate sodium (COLACE) 50 mg, Oral, 2 times daily PRN    ergocalciferol (VITAMIN D2) 50,000 Units, Every 7 days    EScitalopram oxalate (LEXAPRO) 20 MG tablet TAKE 1/2 TABLET BY MOUTH DAILY FOR A WEEK THEN 1 TABLET BY MOUTH DAILY THEREAFTER    ferrous gluconate (FERGON) 324 mg, Oral    fluticasone propionate (FLONASE) 50 mcg/actuation nasal spray USE 1 SPRAY (50 MCG TOTAL) IN EACH NOSTRIL ONCE DAILY    megestroL (MEGACE) 40 mg, Oral, Daily    memantine (NAMENDA) 5 mg,  "Oral, 2 times daily    memantine (NAMENDA) 5 mg, 2 times daily    methocarbamoL (ROBAXIN) 500 mg, 4 times daily    metoprolol succinate (TOPROL-XL) 12.5 mg, Daily    metoprolol succinate (TOPROL-XL) 12.5 mg    pantoprazole (PROTONIX) 40 mg, Daily    polyethylene glycol (GLYCOLAX) 17 g, Oral, 2 times daily PRN    QUEtiapine (SEROQUEL) 25 mg, Oral, 2 times daily    sertraline (ZOLOFT) 100 mg, Oral    vitamin D (VITAMIN D3) 5,000 Units       Review of patient's allergies indicates:   Allergen Reactions    Adhesive tape-silicones         Patient Care Team:  Dewye Cazares MD as PCP - General (Internal Medicine)  Isabela Ambriz MD as Consulting Physician (Cardiology)     Subjective:     Review of Systems    12 point review of systems conducted, negative except as stated in the history of present illness. See HPI for details.    Objective:     Visit Vitals  /74 (BP Location: Left arm, Patient Position: Sitting)   Pulse 81   Temp 97.7 °F (36.5 °C) (Temporal)   Resp 16   Ht 5' 2" (1.575 m)   Wt 49 kg (108 lb)   SpO2 99%   BMI 19.75 kg/m²       Physical Exam  Constitutional:       Appearance: Normal appearance.   HENT:      Head: Normocephalic and atraumatic.   Eyes:      Extraocular Movements: Extraocular movements intact.      Pupils: Pupils are equal, round, and reactive to light.   Cardiovascular:      Rate and Rhythm: Normal rate and regular rhythm.   Pulmonary:      Effort: Pulmonary effort is normal.      Breath sounds: Normal breath sounds.   Skin:     General: Skin is warm and dry.   Neurological:      General: No focal deficit present.      Mental Status: She is alert.   Psychiatric:         Mood and Affect: Mood normal.         Labs Reviewed:     Chemistry:  Lab Results   Component Value Date     07/27/2024    K 3.7 07/27/2024    BUN 26.6 (H) 07/27/2024    CREATININE 1.47 (H) 07/27/2024    EGFRNORACEVR 36 07/27/2024    GLUCOSE 101 07/27/2024    CALCIUM 10.6 (H) 07/27/2024    ALKPHOS 67 " 07/27/2024    LABPROT 7.1 07/27/2024    LABPROT 14.3 07/27/2024    ALBUMIN 4.0 07/27/2024    BILIDIR 0.3 01/20/2021    IBILI 0.30 01/20/2021    AST 22 07/27/2024    ALT 16 07/27/2024    MG 2.10 11/16/2023    [id number] 62.3 08/02/2023    TSH 3.411 07/10/2024        Lab Results   Component Value Date    HGBA1C 5.6 01/20/2021        Hematology:  Lab Results   Component Value Date    WBC 5.10 07/27/2024    HGB 11.1 (L) 07/27/2024    HCT 35.1 (L) 07/27/2024     07/27/2024       Lipid Panel:  Lab Results   Component Value Date    CHOL 130 08/02/2023    HDL 50 08/02/2023    LDL 67.00 08/02/2023    TRIG 64 08/02/2023    TOTALCHOLEST 3 08/02/2023        Urine:  Lab Results   Component Value Date    APPEARANCEUA Clear 07/27/2024    SGUA 1.021 07/27/2024    PROTEINUA Trace (A) 07/27/2024    KETONESUA Trace (A) 07/27/2024    LEUKOCYTESUR Negative 07/27/2024    RBCUA 0-5 07/27/2024    WBCUA 0-5 07/27/2024    BACTERIA None Seen 07/27/2024    SQEPUA Trace 07/27/2024    HYALINECASTS 6-10 (A) 07/27/2024        Assessment:       ICD-10-CM ICD-9-CM   1. Hospice care  Z51.5 V66.7        Plan:     1. Hospice care  Assessment & Plan:  Patient with no further indications to come back to the office she is on hospice services will go ahead and give her her flu vaccine today.           Dc from clinic- on hospice      Dewey Cazares MD

## 2024-12-09 ENCOUNTER — HOSPITAL ENCOUNTER (EMERGENCY)
Facility: HOSPITAL | Age: 79
Discharge: LEFT BEFORE DISCHARGE | End: 2024-12-09
Payer: MEDICARE

## 2024-12-09 VITALS
HEIGHT: 62 IN | SYSTOLIC BLOOD PRESSURE: 130 MMHG | BODY MASS INDEX: 20.24 KG/M2 | WEIGHT: 110 LBS | TEMPERATURE: 99 F | OXYGEN SATURATION: 100 % | DIASTOLIC BLOOD PRESSURE: 81 MMHG | RESPIRATION RATE: 16 BRPM | HEART RATE: 78 BPM

## 2024-12-09 DIAGNOSIS — R07.9 CHEST PAIN: ICD-10-CM

## 2024-12-09 LAB
ALBUMIN SERPL-MCNC: 3.8 G/DL (ref 3.4–4.8)
ALBUMIN/GLOB SERPL: 1.1 RATIO (ref 1.1–2)
ALP SERPL-CCNC: 76 UNIT/L (ref 40–150)
ALT SERPL-CCNC: 22 UNIT/L (ref 0–55)
ANION GAP SERPL CALC-SCNC: 5 MEQ/L
AST SERPL-CCNC: 24 UNIT/L (ref 5–34)
BASOPHILS # BLD AUTO: 0.02 X10(3)/MCL
BASOPHILS NFR BLD AUTO: 0.2 %
BILIRUB SERPL-MCNC: 0.3 MG/DL
BNP BLD-MCNC: 32.5 PG/ML
BUN SERPL-MCNC: 34.9 MG/DL (ref 9.8–20.1)
CALCIUM SERPL-MCNC: 9.3 MG/DL (ref 8.4–10.2)
CHLORIDE SERPL-SCNC: 111 MMOL/L (ref 98–107)
CK SERPL-CCNC: 48 U/L (ref 29–168)
CO2 SERPL-SCNC: 24 MMOL/L (ref 23–31)
CREAT SERPL-MCNC: 1.49 MG/DL (ref 0.55–1.02)
CREAT/UREA NIT SERPL: 23
EOSINOPHIL # BLD AUTO: 1.06 X10(3)/MCL (ref 0–0.9)
EOSINOPHIL NFR BLD AUTO: 13.2 %
ERYTHROCYTE [DISTWIDTH] IN BLOOD BY AUTOMATED COUNT: 15.1 % (ref 11.5–17)
FLUAV AG UPPER RESP QL IA.RAPID: NOT DETECTED
FLUBV AG UPPER RESP QL IA.RAPID: NOT DETECTED
GFR SERPLBLD CREATININE-BSD FMLA CKD-EPI: 35 ML/MIN/1.73/M2
GLOBULIN SER-MCNC: 3.4 GM/DL (ref 2.4–3.5)
GLUCOSE SERPL-MCNC: 98 MG/DL (ref 82–115)
HCT VFR BLD AUTO: 31.3 % (ref 37–47)
HGB BLD-MCNC: 10.2 G/DL (ref 12–16)
IMM GRANULOCYTES # BLD AUTO: 0.04 X10(3)/MCL (ref 0–0.04)
IMM GRANULOCYTES NFR BLD AUTO: 0.5 %
LYMPHOCYTES # BLD AUTO: 1.45 X10(3)/MCL (ref 0.6–4.6)
LYMPHOCYTES NFR BLD AUTO: 18 %
MCH RBC QN AUTO: 27.9 PG (ref 27–31)
MCHC RBC AUTO-ENTMCNC: 32.6 G/DL (ref 33–36)
MCV RBC AUTO: 85.5 FL (ref 80–94)
MONOCYTES # BLD AUTO: 0.71 X10(3)/MCL (ref 0.1–1.3)
MONOCYTES NFR BLD AUTO: 8.8 %
NEUTROPHILS # BLD AUTO: 4.76 X10(3)/MCL (ref 2.1–9.2)
NEUTROPHILS NFR BLD AUTO: 59.3 %
NRBC BLD AUTO-RTO: 0 %
PLATELET # BLD AUTO: 160 X10(3)/MCL (ref 130–400)
PMV BLD AUTO: 10.1 FL (ref 7.4–10.4)
POTASSIUM SERPL-SCNC: 4.2 MMOL/L (ref 3.5–5.1)
PROT SERPL-MCNC: 7.2 GM/DL (ref 5.8–7.6)
RBC # BLD AUTO: 3.66 X10(6)/MCL (ref 4.2–5.4)
SARS-COV-2 RNA RESP QL NAA+PROBE: NOT DETECTED
SODIUM SERPL-SCNC: 140 MMOL/L (ref 136–145)
TROPONIN I SERPL-MCNC: <0.01 NG/ML (ref 0–0.04)
WBC # BLD AUTO: 8.04 X10(3)/MCL (ref 4.5–11.5)

## 2024-12-09 PROCEDURE — 0240U COVID/FLU A&B PCR: CPT | Performed by: NURSE PRACTITIONER

## 2024-12-09 PROCEDURE — 83880 ASSAY OF NATRIURETIC PEPTIDE: CPT | Performed by: NURSE PRACTITIONER

## 2024-12-09 PROCEDURE — 84484 ASSAY OF TROPONIN QUANT: CPT | Performed by: NURSE PRACTITIONER

## 2024-12-09 PROCEDURE — 93005 ELECTROCARDIOGRAM TRACING: CPT

## 2024-12-09 PROCEDURE — 99285 EMERGENCY DEPT VISIT HI MDM: CPT | Mod: 25

## 2024-12-09 PROCEDURE — 99900041 HC LEFT WITHOUT BEING SEEN- EMERGENCY

## 2024-12-09 PROCEDURE — 85025 COMPLETE CBC W/AUTO DIFF WBC: CPT | Performed by: NURSE PRACTITIONER

## 2024-12-09 PROCEDURE — 82550 ASSAY OF CK (CPK): CPT | Performed by: NURSE PRACTITIONER

## 2024-12-09 PROCEDURE — 80053 COMPREHEN METABOLIC PANEL: CPT | Performed by: NURSE PRACTITIONER

## 2024-12-09 PROCEDURE — 93010 ELECTROCARDIOGRAM REPORT: CPT | Mod: ,,, | Performed by: INTERNAL MEDICINE

## 2024-12-10 LAB
OHS QRS DURATION: 120 MS
OHS QTC CALCULATION: 469 MS

## 2024-12-15 DIAGNOSIS — F41.9 ANXIETY: Primary | ICD-10-CM

## 2024-12-15 DIAGNOSIS — B37.9 YEAST INFECTION: ICD-10-CM

## 2024-12-15 DIAGNOSIS — N95.2 VAGINAL ATROPHY: ICD-10-CM

## 2024-12-16 RX ORDER — CLOTRIMAZOLE AND BETAMETHASONE DIPROPIONATE 10; .64 MG/G; MG/G
CREAM TOPICAL 2 TIMES DAILY
Qty: 15 G | Refills: 0 | OUTPATIENT
Start: 2024-12-16

## 2024-12-16 RX ORDER — SERTRALINE HYDROCHLORIDE 50 MG/1
50 TABLET, FILM COATED ORAL
Qty: 90 TABLET | Refills: 3 | Status: SHIPPED | OUTPATIENT
Start: 2024-12-16

## 2025-01-19 ENCOUNTER — HOSPITAL ENCOUNTER (EMERGENCY)
Facility: HOSPITAL | Age: 80
Discharge: HOME | End: 2025-01-19
Attending: EMERGENCY MEDICINE
Payer: MEDICARE

## 2025-01-19 VITALS
HEART RATE: 70 BPM | DIASTOLIC BLOOD PRESSURE: 93 MMHG | WEIGHT: 115 LBS | BODY MASS INDEX: 21.16 KG/M2 | OXYGEN SATURATION: 100 % | SYSTOLIC BLOOD PRESSURE: 176 MMHG | RESPIRATION RATE: 16 BRPM | HEIGHT: 62 IN | TEMPERATURE: 98 F

## 2025-01-19 DIAGNOSIS — R41.82 AMS (ALTERED MENTAL STATUS): ICD-10-CM

## 2025-01-19 DIAGNOSIS — F03.90 DEMENTIA, UNSPECIFIED DEMENTIA SEVERITY, UNSPECIFIED DEMENTIA TYPE, UNSPECIFIED WHETHER BEHAVIORAL, PSYCHOTIC, OR MOOD DISTURBANCE OR ANXIETY: Primary | ICD-10-CM

## 2025-01-19 DIAGNOSIS — R55 SYNCOPE: ICD-10-CM

## 2025-01-19 LAB
ALBUMIN SERPL-MCNC: 3.4 G/DL (ref 3.4–4.8)
ALBUMIN/GLOB SERPL: 1.1 RATIO (ref 1.1–2)
ALP SERPL-CCNC: 67 UNIT/L (ref 40–150)
ALT SERPL-CCNC: 10 UNIT/L (ref 0–55)
AMPHET UR QL SCN: NEGATIVE
ANION GAP SERPL CALC-SCNC: 12 MEQ/L
ANION GAP SERPL CALC-SCNC: 16 MMOL/L (ref 8–16)
AST SERPL-CCNC: 17 UNIT/L (ref 5–34)
BACTERIA #/AREA URNS AUTO: ABNORMAL /HPF
BARBITURATE SCN PRESENT UR: NEGATIVE
BASOPHILS # BLD AUTO: 0.03 X10(3)/MCL
BASOPHILS NFR BLD AUTO: 0.4 %
BENZODIAZ UR QL SCN: NEGATIVE
BILIRUB SERPL-MCNC: 0.3 MG/DL
BILIRUB UR QL STRIP.AUTO: NEGATIVE
BUN SERPL-MCNC: 25 MG/DL (ref 6–30)
BUN SERPL-MCNC: 25.2 MG/DL (ref 9.8–20.1)
CALCIUM SERPL-MCNC: 8.8 MG/DL (ref 8.4–10.2)
CANNABINOIDS UR QL SCN: NEGATIVE
CHLORIDE SERPL-SCNC: 111 MMOL/L (ref 95–110)
CHLORIDE SERPL-SCNC: 113 MMOL/L (ref 98–107)
CLARITY UR: CLEAR
CO2 SERPL-SCNC: 15 MMOL/L (ref 23–31)
COCAINE UR QL SCN: NEGATIVE
COLOR UR AUTO: ABNORMAL
CREAT SERPL-MCNC: 1.51 MG/DL (ref 0.55–1.02)
CREAT SERPL-MCNC: 1.6 MG/DL (ref 0.5–1.4)
CREAT/UREA NIT SERPL: 17
EOSINOPHIL # BLD AUTO: 0.83 X10(3)/MCL (ref 0–0.9)
EOSINOPHIL NFR BLD AUTO: 11.9 %
ERYTHROCYTE [DISTWIDTH] IN BLOOD BY AUTOMATED COUNT: 14.4 % (ref 11.5–17)
ETHANOL SERPL-MCNC: <10 MG/DL
FENTANYL UR QL SCN: NEGATIVE
FLUAV AG UPPER RESP QL IA.RAPID: NOT DETECTED
FLUBV AG UPPER RESP QL IA.RAPID: NOT DETECTED
GFR SERPLBLD CREATININE-BSD FMLA CKD-EPI: 35 ML/MIN/1.73/M2
GLOBULIN SER-MCNC: 3.1 GM/DL (ref 2.4–3.5)
GLUCOSE SERPL-MCNC: 164 MG/DL (ref 70–110)
GLUCOSE SERPL-MCNC: 168 MG/DL (ref 82–115)
GLUCOSE UR QL STRIP: NORMAL
HCT VFR BLD AUTO: 31.3 % (ref 37–47)
HCT VFR BLD CALC: 33 %PCV (ref 36–54)
HGB BLD-MCNC: 11 G/DL
HGB BLD-MCNC: 9.9 G/DL (ref 12–16)
HGB UR QL STRIP: ABNORMAL
IMM GRANULOCYTES # BLD AUTO: 0.03 X10(3)/MCL (ref 0–0.04)
IMM GRANULOCYTES NFR BLD AUTO: 0.4 %
KETONES UR QL STRIP: NEGATIVE
LEUKOCYTE ESTERASE UR QL STRIP: NEGATIVE
LYMPHOCYTES # BLD AUTO: 1.28 X10(3)/MCL (ref 0.6–4.6)
LYMPHOCYTES NFR BLD AUTO: 18.3 %
MAGNESIUM SERPL-MCNC: 2.3 MG/DL (ref 1.6–2.6)
MCH RBC QN AUTO: 27.3 PG (ref 27–31)
MCHC RBC AUTO-ENTMCNC: 31.6 G/DL (ref 33–36)
MCV RBC AUTO: 86.2 FL (ref 80–94)
MDMA UR QL SCN: NEGATIVE
MONOCYTES # BLD AUTO: 0.5 X10(3)/MCL (ref 0.1–1.3)
MONOCYTES NFR BLD AUTO: 7.1 %
NEUTROPHILS # BLD AUTO: 4.33 X10(3)/MCL (ref 2.1–9.2)
NEUTROPHILS NFR BLD AUTO: 61.9 %
NITRITE UR QL STRIP: NEGATIVE
NRBC BLD AUTO-RTO: 0 %
OPIATES UR QL SCN: NEGATIVE
PCP UR QL: NEGATIVE
PH UR STRIP: 7 [PH]
PH UR: 7 [PH] (ref 3–11)
PLATELET # BLD AUTO: 165 X10(3)/MCL (ref 130–400)
PMV BLD AUTO: 10 FL (ref 7.4–10.4)
POC IONIZED CALCIUM: 1.23 MMOL/L (ref 1.06–1.42)
POC PTINR: 1.3 (ref 0.9–1.2)
POC TCO2 (MEASURED): 20 MMOL/L (ref 23–29)
POTASSIUM BLD-SCNC: 4.2 MMOL/L (ref 3.5–5.1)
POTASSIUM SERPL-SCNC: 4.3 MMOL/L (ref 3.5–5.1)
PROT SERPL-MCNC: 6.5 GM/DL (ref 5.8–7.6)
PROT UR QL STRIP: NEGATIVE
RBC # BLD AUTO: 3.63 X10(6)/MCL (ref 4.2–5.4)
RBC #/AREA URNS AUTO: ABNORMAL /HPF
SAMPLE: ABNORMAL
SAMPLE: ABNORMAL
SARS-COV-2 RNA RESP QL NAA+PROBE: NOT DETECTED
SODIUM BLD-SCNC: 142 MMOL/L (ref 136–145)
SODIUM SERPL-SCNC: 140 MMOL/L (ref 136–145)
SP GR UR STRIP.AUTO: 1.02 (ref 1–1.03)
SPECIFIC GRAVITY, URINE AUTO (.000) (OHS): 1.02 (ref 1–1.03)
SQUAMOUS #/AREA URNS LPF: ABNORMAL /HPF
TROPONIN I SERPL-MCNC: <0.01 NG/ML (ref 0–0.04)
UROBILINOGEN UR STRIP-ACNC: NORMAL
WBC # BLD AUTO: 7 X10(3)/MCL (ref 4.5–11.5)
WBC #/AREA URNS AUTO: ABNORMAL /HPF

## 2025-01-19 PROCEDURE — 80053 COMPREHEN METABOLIC PANEL: CPT | Performed by: EMERGENCY MEDICINE

## 2025-01-19 PROCEDURE — 80307 DRUG TEST PRSMV CHEM ANLYZR: CPT | Performed by: EMERGENCY MEDICINE

## 2025-01-19 PROCEDURE — 82077 ASSAY SPEC XCP UR&BREATH IA: CPT | Performed by: EMERGENCY MEDICINE

## 2025-01-19 PROCEDURE — 96360 HYDRATION IV INFUSION INIT: CPT

## 2025-01-19 PROCEDURE — 0240U COVID/FLU A&B PCR: CPT | Performed by: EMERGENCY MEDICINE

## 2025-01-19 PROCEDURE — 25500020 PHARM REV CODE 255: Performed by: EMERGENCY MEDICINE

## 2025-01-19 PROCEDURE — 80047 BASIC METABLC PNL IONIZED CA: CPT

## 2025-01-19 PROCEDURE — 93010 ELECTROCARDIOGRAM REPORT: CPT | Mod: GW,,, | Performed by: STUDENT IN AN ORGANIZED HEALTH CARE EDUCATION/TRAINING PROGRAM

## 2025-01-19 PROCEDURE — 84484 ASSAY OF TROPONIN QUANT: CPT | Performed by: EMERGENCY MEDICINE

## 2025-01-19 PROCEDURE — 81001 URINALYSIS AUTO W/SCOPE: CPT | Performed by: EMERGENCY MEDICINE

## 2025-01-19 PROCEDURE — 83735 ASSAY OF MAGNESIUM: CPT | Performed by: EMERGENCY MEDICINE

## 2025-01-19 PROCEDURE — 93005 ELECTROCARDIOGRAM TRACING: CPT

## 2025-01-19 PROCEDURE — 99285 EMERGENCY DEPT VISIT HI MDM: CPT | Mod: 25

## 2025-01-19 PROCEDURE — 25000003 PHARM REV CODE 250: Performed by: EMERGENCY MEDICINE

## 2025-01-19 PROCEDURE — 85025 COMPLETE CBC W/AUTO DIFF WBC: CPT | Performed by: EMERGENCY MEDICINE

## 2025-01-19 PROCEDURE — 82330 ASSAY OF CALCIUM: CPT | Mod: 91

## 2025-01-19 RX ADMIN — SODIUM CHLORIDE 1000 ML: 9 INJECTION, SOLUTION INTRAVENOUS at 12:01

## 2025-01-19 RX ADMIN — IOHEXOL 75 ML: 350 INJECTION, SOLUTION INTRAVENOUS at 12:01

## 2025-01-19 NOTE — DISCHARGE INSTRUCTIONS
Your hospice nurse is arranging to get you more help at home.  Continue your current home medications including your antibiotic until you finish the prescription.  Try to increase hydration at home

## 2025-01-19 NOTE — ED PROVIDER NOTES
Encounter Date: 1/19/2025    SCRIBE #1 NOTE: I, Shirley Sanchez, am scribing for, and in the presence of,  Mathieu Haile MD. I have scribed the entire note.       History     Chief Complaint   Patient presents with    Loss of Consciousness     Pt had syncopal episode while eating. Pt was sitting in chair. GCS 13 on arrival, baseline GCS 14.     79 year old female with a hx of CAD, HTN, and dementia presents to the ED via EMS for altered mental status. Daughter reports the pt was sitting down eating breakfast and appeared to be at her baseline. About 30 minutes ago, the pt stopped talking and was not following commands. Daughter states the pt has a UTI but has not been taking her medication. Pt does not have any weakness or facial droop. Daughter states the pt is on hospice due to failure to thrive.     Pt's ROS is unobtainable due to altered mental status.     The history is provided by the EMS personnel. The history is limited by the condition of the patient. No  was used.     Review of patient's allergies indicates:   Allergen Reactions    Adhesive tape-silicones      Past Medical History:   Diagnosis Date    Alzheimer's dementia 08/19/2022    CAD (coronary artery disease)     Dementia     Dyslipidemia     GERD (gastroesophageal reflux disease)     Hyperlipidemia     Hypertension      Past Surgical History:   Procedure Laterality Date    CHOLECYSTECTOMY      HYSTERECTOMY      triple bypass       Family History   Problem Relation Name Age of Onset    Diabetes Mother Mother     Lung cancer Father      No Known Problems Sister      No Known Problems Brother       Social History     Tobacco Use    Smoking status: Never     Passive exposure: Never    Smokeless tobacco: Never   Substance Use Topics    Alcohol use: Not Currently    Drug use: Never     Review of Systems   Unable to perform ROS: Patient nonverbal       Physical Exam     Initial Vitals [01/19/25 1150]   BP Pulse Resp Temp SpO2   113/78 95  18 97.7 °F (36.5 °C) 96 %      MAP       --         Physical Exam    Nursing note and vitals reviewed.  HENT:   Head: Normocephalic and atraumatic.   Eyes: Conjunctivae are normal.   Cardiovascular:  Normal rate and intact distal pulses.           Pulmonary/Chest: No respiratory distress. She has no rhonchi.   Abdominal: Abdomen is soft. Bowel sounds are normal. There is no abdominal tenderness. There is no rebound and no guarding.   Musculoskeletal:         General: No edema.     Neurological: She is alert.   No pronator drift. No weakness. No facial asymmetry. Not following commands.    Skin: Skin is warm and dry.         ED Course   Procedures  Labs Reviewed   COMPREHENSIVE METABOLIC PANEL - Abnormal       Result Value    Sodium 140      Potassium 4.3      Chloride 113 (*)     CO2 15 (*)     Glucose 168 (*)     Blood Urea Nitrogen 25.2 (*)     Creatinine 1.51 (*)     Calcium 8.8      Protein Total 6.5      Albumin 3.4      Globulin 3.1      Albumin/Globulin Ratio 1.1      Bilirubin Total 0.3      ALP 67      ALT 10      AST 17      eGFR 35      Anion Gap 12.0      BUN/Creatinine Ratio 17     URINALYSIS, REFLEX TO URINE CULTURE - Abnormal    Color, UA Light-Yellow      Appearance, UA Clear      Specific Gravity, UA 1.023      pH, UA 7.0      Protein, UA Negative      Glucose, UA Normal      Ketones, UA Negative      Blood, UA 1+ (*)     Bilirubin, UA Negative      Urobilinogen, UA Normal      Nitrites, UA Negative      Leukocyte Esterase, UA Negative      RBC, UA 0-5      WBC, UA 0-5      Bacteria, UA None Seen      Squamous Epithelial Cells, UA Trace     CBC WITH DIFFERENTIAL - Abnormal    WBC 7.00      RBC 3.63 (*)     Hgb 9.9 (*)     Hct 31.3 (*)     MCV 86.2      MCH 27.3      MCHC 31.6 (*)     RDW 14.4      Platelet 165      MPV 10.0      Neut % 61.9      Lymph % 18.3      Mono % 7.1      Eos % 11.9      Basophil % 0.4      Imm Grans % 0.4      Neut # 4.33      Lymph # 1.28      Mono # 0.50      Eos # 0.83       Baso # 0.03      Imm Gran # 0.03      NRBC% 0.0     ISTAT CHEM8 - Abnormal    POC Glucose 164 (*)     POC BUN 25      POC Creatinine 1.6 (*)     POC Sodium 142      POC Potassium 4.2      POC Chloride 111 (*)     POC TCO2 (MEASURED) 20 (*)     POC Anion Gap 16      POC Ionized Calcium 1.23      POC Hematocrit 33 (*)     POC HEMOGLOBIN 11      Sample VENOUS     ISTAT PROCEDURE - Abnormal    POC PTINR 1.3 (*)     Sample VENOUS     COVID/FLU A&B PCR - Normal    Influenza A PCR Not Detected      Influenza B PCR Not Detected      SARS-CoV-2 PCR Not Detected      Narrative:     The Xpert Xpress SARS-CoV-2/FLU/RSV plus is a rapid, multiplexed real-time PCR test intended for the simultaneous qualitative detection and differentiation of SARS-CoV-2, Influenza A, Influenza B, and respiratory syncytial virus (RSV) viral RNA in either nasopharyngeal swab or nasal swab specimens.         ALCOHOL,MEDICAL (ETHANOL) - Normal    Ethanol Level <10.0     DRUG SCREEN, URINE (BEAKER) - Normal    Amphetamines, Urine Negative      Barbiturates, Urine Negative      Benzodiazepine, Urine Negative      Cannabinoids, Urine Negative      Cocaine, Urine Negative      Fentanyl, Urine Negative      MDMA, Urine Negative      Opiates, Urine Negative      Phencyclidine, Urine Negative      pH, Urine 7.0      Specific Gravity, Urine Auto 1.023      Narrative:     Cut off concentrations:    Amphetamines - 1000 ng/ml  Barbiturates - 200 ng/ml  Benzodiazepine - 200 ng/ml  Cannabinoids (THC) - 50 ng/ml  Cocaine - 300 ng/ml  Fentanyl - 1.0 ng/ml  MDMA - 500 ng/ml  Opiates - 300 ng/ml   Phencyclidine (PCP) - 25 ng/ml    Specimen submitted for drug analysis and tested for pH and specific gravity in order to evaluate sample integrity. Suspect tampering if specific gravity is <1.003 and/or pH is not within the range of 4.5 - 8.0  False negatives may result form substances such as bleach added to urine.  False positives may result for the presence of a  substance with similar chemical structure to the drug or its metabolite.    This test provides only a PRELIMINARY analytical test result. A more specific alternate chemical method must be used in order to obtain a confirmed analytical result. Gas chromatography/mass spectrometry (GC/MS) is the preferred confirmatory method. Other chemical confirmation methods are available. Clinical consideration and professional judgement should be applied to any drug of abuse test result, particularly when preliminary positive results are used.    Positive results will be confirmed only at the physicians request. Unconfirmed screening results are to be used only for medical purposes (treatment).        MAGNESIUM - Normal    Magnesium Level 2.30     TROPONIN I - Normal    Troponin-I <0.010     CBC W/ AUTO DIFFERENTIAL    Narrative:     The following orders were created for panel order CBC auto differential.  Procedure                               Abnormality         Status                     ---------                               -----------         ------                     CBC with Differential[[id number]]       Abnormal            Final result                 Please view results for these tests on the individual orders.        ECG Results              EKG and show to ED MD (Preliminary result)  Result time 01/19/25 12:43:14      Wet Read by Mathieu Haile MD (01/19/25 12:43:14, [facility] - Emergency Dept, Emergency Medicine)    1225.  69 beats per minute sinus rhythm left axis deviation LVH repolarization no ST elevation                                  Imaging Results              CTA Head and Neck (xpd) (Final result)  Result time 01/19/25 13:05:50      Final result by Héctor Song MD (01/19/25 13:05:50)                   Impression:      No high-grade arterial stenosis or occlusion head or neck.      Electronically signed by: Héctor Song  Date:    01/19/2025  Time:    13:05                Narrative:    EXAMINATION:  CTA HEAD AND NECK (XPD)    CLINICAL HISTORY:  Syncope, simple, abnormal neuro exam;    TECHNIQUE:  Helical acquisition through the head and neck without and with IV contrast targeting the arterial phase. 3D MIP reconstructions made available for review. DLP 1231 mGycm. Automatic exposure control, adjustment of mA/kV or iterative reconstruction technique was used to reduce radiation.  NASCET criteria utilized.    COMPARISON:  No priors    FINDINGS:  Three vessel aortic arch.  Mild-to-moderate atherosclerotic disease along the carotid bulbs.  No significant narrowing of the cervical carotid arteries.  Cervical vertebral arteries are widely patent.    Moderate atherosclerotic disease along the cavernous portions of the internal carotid arteries with mild-to-moderate narrowing.  No high-grade intracranial stenosis or occlusion.                                       CT HEAD FOR STROKE (Final result)  Result time 01/19/25 12:11:14   Procedure changed from CT Head Without Contrast     Final result by Armando Anderson MD (01/19/25 12:11:14)                   Impression:      No acute intracranial hemorrhage.  Findings of chronic microvascular ischemic disease.    Findings reported to Dr. Martínez prior to interpretation.      Electronically signed by: Armando Anderson  Date:    01/19/2025  Time:    12:11               Narrative:    EXAMINATION:  CT HEAD FOR STROKE    CLINICAL HISTORY:  Syncope, simple, abnormal neuro exam;    TECHNIQUE:  Low dose axial images were obtained through the head.  Coronal and sagittal reformations were also performed. Contrast was not administered.    Automatic exposure control was utilized to reduce the patient's radiation dose.    DLP = 909    COMPARISON:  11/16/2023    FINDINGS:  No acute intracranial hemorrhage, edema or mass. No acute parenchymal abnormality.    Diffuse cerebral atrophy with concordant ventricular enlargement.    Scattered hypodensities throughout  the deep periventricular white matter.    The osseous structures are normal.    The mastoid air cells are clear.    The auditory canals are patent bilaterally.    The globes and orbital contents are normal bilaterally.    The visualized maxillary, ethmoid and sphenoid sinuses are clear.                                       Medications   iohexoL (OMNIPAQUE 350) injection 75 mL (75 mLs Intravenous Given 1/19/25 1221)   sodium chloride 0.9% bolus 1,000 mL 1,000 mL (0 mLs Intravenous Stopped 1/19/25 1333)     Medical Decision Making  The differential diagnosis includes, but is not limited to, UTI, dementia, electrolyte abnormality, or CVA.     Amount and/or Complexity of Data Reviewed  Labs: ordered.  Radiology: ordered.    Risk  Prescription drug management.            Scribe Attestation:   Scribe #1: I performed the above scribed service and the documentation accurately describes the services I performed. I attest to the accuracy of the note.    Attending Attestation:           Physician Attestation for Scribe:  Physician Attestation Statement for Scribe #1: I, Mathieu Haile MD, reviewed documentation, as scribed by Shirley Sanchez in my presence, and it is both accurate and complete.             ED Course as of 01/19/25 1631   Sun Jan 19, 2025   1202 Patient's hospice nurse states that patient has end-stage Alzheimer's dementia in that EMS was called prior to hospice being called and has a nurse was trying to educate the daughter that she would have episodes like this.  Daughter sent her to the hospital with EMS.  On exam here she does not have any focal deficits she is not speaking to me but is awake and alert no facial asymmetry pupils are equal and reactive does not have any drift in the arms.  She is not fully cooperative with the exam.  Daughter reports that she has been being treated for UTI and has not been taking the pills.  Symptoms are generalized in nature I feel his stroke is very unlikely discussed this  with the daughter.  I do not feel thrombolytics would be appropriate given her presentation especially given that she is on hospice in a DNR.  Daughter understands this and agrees [LF]   1321 No anion gap on labs [LF]      ED Course User Index  [LF] Mathieu Haile MD       ED workup has been reassuring.  Hospitalist nurse reports that this is her baseline condition.  She has remained hemodynamically stable here in the emergency department for over 4-1/2 hours.  I recommend they complete the antibiotic prescription that they have currently been taking increase oral hydration home.  The daughter recently had a hysterectomy and stating that she needs more help at home it did get in touch with the hospitalist to see if they can switch her to an inpatient service with a apparently of the daughter has declined this recently in his said they are trying to arrange for her to have more help at home at least a few days this week.  Discussed all this with the daughter                      Clinical Impression:  Final diagnoses:  [R55] Syncope  [R41.82] AMS (altered mental status)  [F03.90] Dementia, unspecified dementia severity, unspecified dementia type, unspecified whether behavioral, psychotic, or mood disturbance or anxiety (Primary)          ED Disposition Condition    Discharge Stable          ED Prescriptions    None       Follow-up Information       Follow up With Specialties Details Why Contact Info    Dewey Cazares MD Internal Medicine Schedule an appointment as soon as possible for a visit   [address] [place] [address].  [place] [id number]  [phone]               Mathieu Haile MD  01/19/25 3600

## 2025-01-20 LAB
OHS QRS DURATION: 128 MS
OHS QTC CALCULATION: 475 MS

## 2025-06-02 ENCOUNTER — TELEPHONE (OUTPATIENT)
Dept: INTERNAL MEDICINE | Facility: CLINIC | Age: 80
End: 2025-06-02
Payer: MEDICARE